# Patient Record
Sex: FEMALE | Race: WHITE | NOT HISPANIC OR LATINO | Employment: UNEMPLOYED | URBAN - METROPOLITAN AREA
[De-identification: names, ages, dates, MRNs, and addresses within clinical notes are randomized per-mention and may not be internally consistent; named-entity substitution may affect disease eponyms.]

---

## 2017-02-06 ENCOUNTER — HOSPITAL ENCOUNTER (EMERGENCY)
Facility: HOSPITAL | Age: 42
Discharge: HOME | End: 2017-02-06
Attending: SURGERY
Payer: MEDICAID

## 2017-02-06 VITALS
TEMPERATURE: 98 F | HEIGHT: 63 IN | RESPIRATION RATE: 20 BRPM | SYSTOLIC BLOOD PRESSURE: 161 MMHG | DIASTOLIC BLOOD PRESSURE: 97 MMHG | WEIGHT: 235 LBS | HEART RATE: 80 BPM | BODY MASS INDEX: 41.64 KG/M2

## 2017-02-06 DIAGNOSIS — K52.9 COLITIS: Primary | ICD-10-CM

## 2017-02-06 LAB
ALBUMIN SERPL BCP-MCNC: 3.9 G/DL
ALP SERPL-CCNC: 133 U/L
ALT SERPL W/O P-5'-P-CCNC: 224 U/L
AMYLASE SERPL-CCNC: 35 U/L
ANION GAP SERPL CALC-SCNC: 11 MMOL/L
APTT BLDCRRT: 25.6 SEC
AST SERPL-CCNC: 108 U/L
BASOPHILS # BLD AUTO: 0.02 K/UL
BASOPHILS NFR BLD: 0.2 %
BILIRUB SERPL-MCNC: 0.6 MG/DL
BILIRUB UR QL STRIP: NEGATIVE
BNP SERPL-MCNC: 10 PG/ML
BUN SERPL-MCNC: 8 MG/DL
CALCIUM SERPL-MCNC: 9.6 MG/DL
CHLORIDE SERPL-SCNC: 103 MMOL/L
CK MB SERPL-MCNC: 1 NG/ML
CK MB SERPL-RTO: 0.9 %
CK SERPL-CCNC: 112 U/L
CK SERPL-CCNC: 112 U/L
CLARITY UR: CLEAR
CO2 SERPL-SCNC: 23 MMOL/L
COLOR UR: YELLOW
CREAT SERPL-MCNC: 0.6 MG/DL
D DIMER PPP IA.FEU-MCNC: 0.39 MG/L FEU
DIFFERENTIAL METHOD: ABNORMAL
EOSINOPHIL # BLD AUTO: 0.1 K/UL
EOSINOPHIL NFR BLD: 1.2 %
ERYTHROCYTE [DISTWIDTH] IN BLOOD BY AUTOMATED COUNT: 14.8 %
EST. GFR  (AFRICAN AMERICAN): >60 ML/MIN/1.73 M^2
EST. GFR  (NON AFRICAN AMERICAN): >60 ML/MIN/1.73 M^2
GLUCOSE SERPL-MCNC: 154 MG/DL
GLUCOSE UR QL STRIP: NEGATIVE
HCT VFR BLD AUTO: 43.3 %
HGB BLD-MCNC: 14.7 G/DL
HGB UR QL STRIP: NEGATIVE
INR PPP: 1
KETONES UR QL STRIP: NEGATIVE
LEUKOCYTE ESTERASE UR QL STRIP: NEGATIVE
LYMPHOCYTES # BLD AUTO: 2.9 K/UL
LYMPHOCYTES NFR BLD: 34 %
MAGNESIUM SERPL-MCNC: 1.9 MG/DL
MCH RBC QN AUTO: 26.4 PG
MCHC RBC AUTO-ENTMCNC: 33.9 %
MCV RBC AUTO: 78 FL
MONOCYTES # BLD AUTO: 0.4 K/UL
MONOCYTES NFR BLD: 4.7 %
NEUTROPHILS # BLD AUTO: 5.1 K/UL
NEUTROPHILS NFR BLD: 59.9 %
NITRITE UR QL STRIP: NEGATIVE
PH UR STRIP: 6 [PH] (ref 5–8)
PHOSPHATE SERPL-MCNC: 3.4 MG/DL
PLATELET # BLD AUTO: 297 K/UL
PMV BLD AUTO: 10.3 FL
POTASSIUM SERPL-SCNC: 4.2 MMOL/L
PROT SERPL-MCNC: 8.6 G/DL
PROT UR QL STRIP: NEGATIVE
PROTHROMBIN TIME: 10.5 SEC
RBC # BLD AUTO: 5.56 M/UL
SODIUM SERPL-SCNC: 137 MMOL/L
SP GR UR STRIP: 1.02 (ref 1–1.03)
TROPONIN I SERPL DL<=0.01 NG/ML-MCNC: <0.006 NG/ML
TSH SERPL DL<=0.005 MIU/L-ACNC: 1.25 UIU/ML
URN SPEC COLLECT METH UR: NORMAL
UROBILINOGEN UR STRIP-ACNC: NEGATIVE EU/DL
WBC # BLD AUTO: 8.47 K/UL

## 2017-02-06 PROCEDURE — 99284 EMERGENCY DEPT VISIT MOD MDM: CPT | Mod: 25

## 2017-02-06 PROCEDURE — 85025 COMPLETE CBC W/AUTO DIFF WBC: CPT

## 2017-02-06 PROCEDURE — 84484 ASSAY OF TROPONIN QUANT: CPT

## 2017-02-06 PROCEDURE — 25500020 PHARM REV CODE 255: Performed by: SURGERY

## 2017-02-06 PROCEDURE — 80053 COMPREHEN METABOLIC PANEL: CPT

## 2017-02-06 PROCEDURE — 81003 URINALYSIS AUTO W/O SCOPE: CPT

## 2017-02-06 PROCEDURE — 85730 THROMBOPLASTIN TIME PARTIAL: CPT

## 2017-02-06 PROCEDURE — 96361 HYDRATE IV INFUSION ADD-ON: CPT

## 2017-02-06 PROCEDURE — 83735 ASSAY OF MAGNESIUM: CPT

## 2017-02-06 PROCEDURE — 84100 ASSAY OF PHOSPHORUS: CPT

## 2017-02-06 PROCEDURE — 96360 HYDRATION IV INFUSION INIT: CPT

## 2017-02-06 PROCEDURE — 36415 COLL VENOUS BLD VENIPUNCTURE: CPT

## 2017-02-06 PROCEDURE — 93005 ELECTROCARDIOGRAM TRACING: CPT

## 2017-02-06 PROCEDURE — 93010 ELECTROCARDIOGRAM REPORT: CPT | Mod: ,,, | Performed by: INTERNAL MEDICINE

## 2017-02-06 PROCEDURE — 84443 ASSAY THYROID STIM HORMONE: CPT

## 2017-02-06 PROCEDURE — 25000003 PHARM REV CODE 250: Performed by: SURGERY

## 2017-02-06 PROCEDURE — 82553 CREATINE MB FRACTION: CPT

## 2017-02-06 PROCEDURE — 82150 ASSAY OF AMYLASE: CPT

## 2017-02-06 PROCEDURE — 83880 ASSAY OF NATRIURETIC PEPTIDE: CPT

## 2017-02-06 PROCEDURE — 85379 FIBRIN DEGRADATION QUANT: CPT

## 2017-02-06 PROCEDURE — 85610 PROTHROMBIN TIME: CPT

## 2017-02-06 RX ORDER — DICYCLOMINE HYDROCHLORIDE 20 MG/1
20 TABLET ORAL 4 TIMES DAILY PRN
Qty: 15 TABLET | Refills: 0 | Status: SHIPPED | OUTPATIENT
Start: 2017-02-06 | End: 2017-03-08

## 2017-02-06 RX ORDER — ONDANSETRON 4 MG/1
4 TABLET, ORALLY DISINTEGRATING ORAL EVERY 8 HOURS PRN
Qty: 20 TABLET | Refills: 0 | Status: SHIPPED | OUTPATIENT
Start: 2017-02-06 | End: 2017-03-22

## 2017-02-06 RX ORDER — METRONIDAZOLE 500 MG/1
500 TABLET ORAL 4 TIMES DAILY
Qty: 28 TABLET | Refills: 0 | Status: SHIPPED | OUTPATIENT
Start: 2017-02-06 | End: 2017-02-13

## 2017-02-06 RX ORDER — CIPROFLOXACIN 500 MG/1
500 TABLET ORAL 2 TIMES DAILY
Qty: 14 TABLET | Refills: 0 | Status: SHIPPED | OUTPATIENT
Start: 2017-02-06 | End: 2017-02-13

## 2017-02-06 RX ORDER — SODIUM CHLORIDE 9 MG/ML
1000 INJECTION, SOLUTION INTRAVENOUS
Status: COMPLETED | OUTPATIENT
Start: 2017-02-06 | End: 2017-02-06

## 2017-02-06 RX ADMIN — IOHEXOL 100 ML: 350 INJECTION, SOLUTION INTRAVENOUS at 03:02

## 2017-02-06 RX ADMIN — IOHEXOL 30 ML: 350 INJECTION, SOLUTION INTRAVENOUS at 03:02

## 2017-02-06 RX ADMIN — SODIUM CHLORIDE 1000 ML: 0.9 INJECTION, SOLUTION INTRAVENOUS at 01:02

## 2017-02-06 NOTE — ED AVS SNAPSHOT
[facility] [address] [place] [address]               Blessing Maddox   2017  1:25 PM   ED    Description:  Female : 1975   Department:  [facility]           Your Care was Coordinated By:     Provider Role From To    Channing Franks MD Attending Provider 17 1300 --      Reason for Visit     Eye Problem     Abdominal Pain           Diagnoses this Visit        Comments    Colitis    -  Primary       ED Disposition     ED Disposition Condition Comment    Discharge             To Do List           Follow-up Information     Follow up with Thang Mcdonald MD. Schedule an appointment as soon as possible for a visit in 2 days.    Specialties:  Internal Medicine, Gastroenterology    Contact information:    [address] [id number] [phone]         These Medications        Disp Refills Start End    dicyclomine (BENTYL) 20 mg tablet 15 tablet 0 2017 3/8/2017    Take 1 tablet (20 mg total) by mouth 4 (four) times daily as needed (CRAMPS). - Oral    Pharmacy: [facility] [address] [place] [address] [place] [address] [address] [facility] Ph #: [phone]       ondansetron (ZOFRAN-ODT) 4 MG TbDL 20 tablet 0 2017     Take 1 tablet (4 mg total) by mouth every 8 (eight) hours as needed (nausea). - Oral    Pharmacy: [facility] [address] [place] [address] [address] [facility] Ph #: [phone]       ciprofloxacin HCl (CIPRO) 500 MG tablet 14 tablet 0 2017    Take 1 tablet (500 mg total) by mouth 2 (two) times daily. - Oral    Pharmacy: [facility] [address] [place] [address] [address] [facility] AT [facility] Ph #: [phone]       metronidazole (FLAGYL) 500 MG tablet 28 tablet 0 2017    Take 1 tablet (500 mg total) by mouth 4 (four) times daily. - Oral    Pharmacy: [facility] [address] [place] [address]  [facility] [address] AT [facility] Ph #: [phone]         [facility]     [facility] - 24/7 Assistance  Registered nurses in the [facility] provide clinical advisement, health education, appointment booking, and other advisory services.  Call for this free service at [phone].             Medications           Message regarding Medications     Verify the changes and/or additions to your medication regime listed below are the same as discussed with your clinician today.  If any of these changes or additions are incorrect, please notify your healthcare provider.        START taking these NEW medications        Refills    dicyclomine (BENTYL) 20 mg tablet 0    Sig: Take 1 tablet (20 mg total) by mouth 4 (four) times daily as needed (CRAMPS).    Class: Normal    Route: Oral    ondansetron (ZOFRAN-ODT) 4 MG TbDL 0    Sig: Take 1 tablet (4 mg total) by mouth every 8 (eight) hours as needed (nausea).    Class: Normal    Route: Oral    ciprofloxacin HCl (CIPRO) 500 MG tablet 0    Sig: Take 1 tablet (500 mg total) by mouth 2 (two) times daily.    Class: Normal    Route: Oral    metronidazole (FLAGYL) 500 MG tablet 0    Sig: Take 1 tablet (500 mg total) by mouth 4 (four) times daily.    Class: Normal    Route: Oral      These medications were administered today        Dose Freq    0.9%  NaCl infusion 1,000 mL ED 1 Time    Sig: Inject 1,000 mLs into the vein ED 1 Time.    Class: Normal    Route: Intravenous    omnipaque 350 iohexol 100 mL 100 mL IMG once as needed    Sig: Inject 100 mLs into the vein ONCE PRN for contrast.    Class: Normal    Route: Intravenous    omnipaque 350 iohexol 30 mL 30 mL IMG once as needed    Sig: Take 30 mLs by mouth ONCE PRN for contrast.    Class: Normal    Route: Oral           Verify that the below list of medications is an accurate representation of the medications you are currently taking.  If none reported, the list may be blank. If  "incorrect, please contact your healthcare provider. Carry this list with you in case of emergency.           Current Medications     lisinopril 10 MG tablet Take 10 mg by mouth once daily.    metformin (GLUCOPHAGE) 500 MG tablet Take 500 mg by mouth 2 (two) times daily with meals.    paroxetine (PAXIL) 10 MG tablet Take 1 tablet (10 mg total) by mouth once daily.    ciprofloxacin HCl (CIPRO) 500 MG tablet Take 1 tablet (500 mg total) by mouth 2 (two) times daily.    dicyclomine (BENTYL) 20 mg tablet Take 1 tablet (20 mg total) by mouth 4 (four) times daily as needed (CRAMPS).    metronidazole (FLAGYL) 500 MG tablet Take 1 tablet (500 mg total) by mouth 4 (four) times daily.    omeprazole (PRILOSEC) 20 MG capsule Take 1 capsule (20 mg total) by mouth once daily.    ondansetron (ZOFRAN-ODT) 4 MG TbDL Take 1 tablet (4 mg total) by mouth every 8 (eight) hours as needed (nausea).           Clinical Reference Information           Your Vitals Were     BP Pulse Temp Resp Height Weight    161/97 (BP Location: Left arm) 80 97.7 °F (36.5 °C) (Oral) 20 5' 3" (1.6 m) 106.6 kg (235 lb)    Last Period BMI             05/01/2015 41.63 kg/m2         Allergies as of 2/6/2017     No Known Allergies      Immunizations Administered on Date of Encounter - 2/6/2017     None      ED Micro, Lab, POCT     Start Ordered       Status Ordering Provider    02/06/17 1330 02/06/17 1331  CBC auto differential  STAT      Final result     02/06/17 1330 02/06/17 1331  Comprehensive metabolic panel  STAT      Final result     02/06/17 1330 02/06/17 1331  Amylase  Once      Final result     02/06/17 1330 02/06/17 1331  Urinalysis  STAT      Final result     02/06/17 1330 02/06/17 1331  Phosphorus  STAT      Final result     02/06/17 1330 02/06/17 1331  Magnesium  STAT      Final result     02/06/17 1330 02/06/17 1331  TSH  STAT      Final result     02/06/17 1330 02/06/17 1331  Protime-INR  STAT      Final result     02/06/17 1330 02/06/17 1331  APTT  " STAT      Final result     02/06/17 1330 02/06/17 1331  D dimer, quantitative  STAT      Final result     02/06/17 1330 02/06/17 1331  Brain natriuretic peptide  STAT      Final result     02/06/17 1330 02/06/17 1331  CK-MB  STAT      Final result     02/06/17 1330 02/06/17 1331  CK  STAT      Final result     02/06/17 1330 02/06/17 1331  Troponin I  Now then every 6 hours     Start Status   02/06/17 1330 Final result   02/06/17 1930 Acknowledged   02/07/17 0130 Scheduled   02/07/17 0730 Scheduled   02/07/17 1330 Scheduled   02/07/17 1930 Scheduled   02/08/17 0130 Scheduled   02/08/17 0730 Scheduled   02/08/17 1330 Scheduled   02/08/17 1930 Scheduled   02/09/17 0130 Scheduled   02/09/17 0730 Scheduled   02/09/17 1330 Scheduled   02/09/17 1930 Scheduled       Acknowledged       ED Imaging Orders     Start Ordered       Status Ordering Provider    02/06/17 1330 02/06/17 1331  CT Abdomen Pelvis With Contrast  1 time imaging      Final result     02/06/17 1330 02/06/17 1331  CT Head Without Contrast  1 time imaging      Final result     02/06/17 1330 02/06/17 1331    1 time imaging,   Status:  Canceled      Canceled       Discharge References/Attachments     GASTROENTERITIS, NONINFECTIOUS (ENGLISH)      Your Scheduled Appointments     Feb 15, 2017  8:45 AM CST   Return Oncology with Demetris Smiley MD   [facility] - GYN Oncology ([facility] )    [address]  [place] [address]   [phone]              [facility] Sign-Up     Activating your [facility] account is as easy as 1-2-3!     1) Visit [email or web address], select Sign Up Now, enter this activation code and your date of birth, then select Next.  [email or web address]  Expires: 3/22/2017  2:07 PM      2) Create a username and password to use when you visit [facility] in the future and select a security question in case you lose your password and select Next.    3) Enter your e-mail address and click Sign Up!    Additional Information  If you have questions,  please e-mail [email or web address] or call [phone] to talk to our [facility] staff. Remember, [facility] is NOT to be used for urgent needs. For medical emergencies, dial 911.          [facility] complies with applicable Federal civil rights laws and does not discriminate on the basis of race, color, national origin, age, disability, or sex.        Language Assistance Services     ATTENTION: Language assistance services are available, free of charge. Please call [phone].      ATENCIÓN: Si habla español, tiene a fuentes disposición servicios gratuitos de asistencia lingüística. Llame al [phone].     CHÚ Ý: N?u b?n nói Ti?ng Vi?t, có các d?ch v? h? tr? ngôn ng? mi?n phí dành cho b?n. G?i s? [phone].        Medications Administered     0.9%  NaCl infusion                  omnipaque 350 iohexol 100 mL                  omnipaque 350 iohexol 30 mL                    Administrations This Visit        Admin Date Action                   0.9%  NaCl infusion 02/06/2017 New Bag                   Admin Date Action                   omnipaque 350 iohexol 100 mL 02/06/2017 Given                   Admin Date Action                   omnipaque 350 iohexol 30 mL 02/06/2017 Given                  Administrations This Visit     0.9%  NaCl infusion     Admin Date Action Dose Rate Route Administered By          02/06/2017 New Bag 1000 mL 999 mL/hr Intravenous Nadia Mota RN                   omnipaque 350 iohexol 100 mL     Admin Date Action Dose Route Administered By             02/06/2017 Given 100 mL Intravenous Evita Olvera                    omnipaque 350 iohexol 30 mL     Admin Date Action Dose Route Administered By             02/06/2017 Given 30 mL Oral Evita Olvera

## 2017-02-06 NOTE — ED PROVIDER NOTES
"[facility]                                        2017                   Chief Complaint  41 y.o. female with Eye Problem and Abdominal Pain (RUQ)    History of Present Illness  Blessing Maddox presents to the emergency room with right upper quadrant pain for months  Patient has been seeing a nurse practitioner who said she had elevation of her liver enzymes  Patient states that she wanted evaluation of her LFTs and chronic right upper quadrant pain  Patient wants another emergency room yesterday, mild elevation of LFTs noted on evaluation  Patient states she has daily right upper quadrant pain, "I think have been jaundiced this week"  Patient has no evidence of jaundice, has a soft obese abdomen in the ER on evaluation today    Patient states also she was newly diagnosed with diabetes, blood sugar the 200s today  Patient states at times she has blurry vision, she thinks it is from diabetic eye disease  Patient on exam today is normal visual acuity no field deficits on ER evaluation today  Patient states she would like to be evaluated for diabetes and her abdominal pain    The history is provided by the patient    Past Medical History   -- Diabetes mellitus    -- DUB (dysfunctional uterine bleeding)    -- Endometrial hyperplasia    -- Hypertension    -- Low back pain    -- TIA (transient ischemic attack)    -- Uterine cancer      Past Surgical History   -- Hysteroscopy, d&c     -- Hysterectomy     --  section     -- Cholecystectomy     -- Bilateral salpingoophorectomy        No Known Allergies     Review of Systems and Physical Exam     Review of Systems  -- Constitution - no fever, denies fatigue, no weakness, no chills  -- Eyes - no tearing or redness, no visual disturbance  -- Ear, Nose - no tinnitus or earache, no nasal congestion or discharge  -- Mouth,Throat - no sore throat, no toothache, normal voice, normal swallowing  -- Respiratory - denies cough and " congestion, no shortness of breath, no SALINAS  -- Cardiovascular - denies chest pain, no palpitations, denies claudication  -- Gastrointestinal - abdominal cramps with nausea, vomiting, & diarrhea   -- Musculoskeletal - denies back pain, negative for myalgias and arthralgias   -- Neurological - headache, denies weakness or seizure; no LOC  -- Skin - denies pallor, rash, or changes in skin. no hives or welts noted    Vital Signs  -- Temperature is 97.7 °F (36.5 °C).   -- Her blood pressure is 161/97 (abnormal) and her pulse is 80.   -- Her respiration is 20.      Physical Exam  -- Nursing note and vitals reviewed  -- Constitutional: Appears well-developed and well-nourished  -- Head: Atraumatic. Normocephalic. No obvious abnormality  -- Eyes: Pupils are equal and reactive to light. Normal conjunctiva and lids  -- Cardiac: Normal rate, regular rhythm and normal heart sounds  -- Pulmonary: Normal respiratory effort, breath sounds clear to auscultation  -- Abdominal: Soft, no tenderness. Normal bowel sounds. Normal liver edge  -- Musculoskeletal: Normal range of motion, no effusions. Joints stable   -- Neurological: No focal deficits. Showed good interaction with staff    Emergency Room Course     Treatment and Evaluation  -- The CT of the head performed in the ER today was negative for acute pathology   -- The EKG findings today were without concerning findings from baseline   -- The CBC drawn in the ER today was within normal limits   -- The troponin drawn in the ER today was within normal limits    -- The magnesium and phosphorus were within normal limits   -- The d-dimer drawn in the ER today were within normal limits.   -- The BNP drawn in the ER today were within normal limits  -- The PT, PTT, and INR were within normal limits.    -- CT of the abdomen and pelvis shows colitis    Abnormal lab values  -- Glucose 154 (*)   -- Total Protein 8.6 (*)   --  (*)   --  (*)     Medications Given  -- 0.9%  NaCl  infusion (0 mLs Intravenous Stopped 2/6/17 1530)   -- omnipaque 350 iohexol 100 mL (100 mLs Intravenous Given 2/6/17 1557)   -- omnipaque 350 iohexol 30 mL (30 mLs Oral Given 2/6/17 1557)     ED Management  -- The patient had normal lab work in the ER with a CT of the abdomen showed colitis  -- Patient gives no history of recent antibiotic use or risk factors for C. difficile colitis  -- Patient will be placed on Cipro, counseled on close follow up with gastroenterology  -- Patient had minimal elevation of her LFTs, counseled to follow up with gastroenterology  -- Patient is asymptomatic prior to discharge, agrees with plan of care for this week    Diagnosis  -- The encounter diagnosis was Colitis.    Disposition and Plan  -- Disposition: home  -- Condition: stable  -- Follow-up: Patient to follow up with Libra Del Valle MD in 1-2 days.  -- I advised the patient that we have found no life threatening condition today  -- At this time, I believe the patient is clinically stable for discharge.   -- The patient acknowledges that close follow up with a MD is required   -- Patient agrees to comply with all instruction and direction given in the ER    This note is dictated on Dragon Natural Speaking word recognition program.  There are word recognition mistakes that are occasionally missed on review.           Channing Franks MD  02/06/17 9753

## 2017-02-15 ENCOUNTER — OFFICE VISIT (OUTPATIENT)
Dept: GYNECOLOGIC ONCOLOGY | Facility: CLINIC | Age: 42
End: 2017-02-15
Payer: MEDICAID

## 2017-02-15 VITALS
DIASTOLIC BLOOD PRESSURE: 73 MMHG | BODY MASS INDEX: 41.61 KG/M2 | HEIGHT: 63 IN | HEART RATE: 88 BPM | SYSTOLIC BLOOD PRESSURE: 129 MMHG | WEIGHT: 234.81 LBS

## 2017-02-15 DIAGNOSIS — C54.1 ENDOMETRIAL CANCER: Primary | ICD-10-CM

## 2017-02-15 PROCEDURE — 99213 OFFICE O/P EST LOW 20 MIN: CPT | Mod: PBBFAC | Performed by: OBSTETRICS & GYNECOLOGY

## 2017-02-15 PROCEDURE — 99999 PR PBB SHADOW E&M-EST. PATIENT-LVL III: CPT | Mod: PBBFAC,,, | Performed by: OBSTETRICS & GYNECOLOGY

## 2017-02-15 PROCEDURE — 99214 OFFICE O/P EST MOD 30 MIN: CPT | Mod: S$PBB,,, | Performed by: OBSTETRICS & GYNECOLOGY

## 2017-02-15 RX ORDER — LANCETS 30 GAUGE
EACH MISCELLANEOUS
Refills: 2 | Status: ON HOLD | COMMUNITY
Start: 2017-01-24 | End: 2021-01-01 | Stop reason: HOSPADM

## 2017-02-15 RX ORDER — BLOOD SUGAR DIAGNOSTIC
STRIP MISCELLANEOUS
Refills: 2 | COMMUNITY
Start: 2017-01-24 | End: 2017-04-05 | Stop reason: SDUPTHER

## 2017-02-15 NOTE — PROGRESS NOTES
Subjective:      Patient ID: Blessing Maddox is a 41 y.o. female.    Chief Complaint: Endometrial Cancer (3 mth)    Treatment History:  Outside [facility] revealing grade I endometrial cancer  RA BSO/Pelvic LAD on 9/15/15 with negative path - Final stage IAG1  IHC of primary tumor for MMR was normal    HPI  Returns today for 3 month follow up visit. Denies VB.  Had a bad fall down the stairs 1 month ago with possible liver contusion.  Now in middle of eval for liver dysfunction.      Review of Systems   Constitutional: Negative for activity change, appetite change, chills, fatigue and fever.   HENT: Negative for hearing loss, mouth sores, nosebleeds, sore throat and tinnitus.    Eyes: Negative for visual disturbance.   Respiratory: Negative for cough, chest tightness, shortness of breath and wheezing.    Cardiovascular: Negative for chest pain and leg swelling.   Gastrointestinal: Negative for abdominal distention, abdominal pain, blood in stool, constipation, diarrhea, nausea and vomiting.   Genitourinary: Negative for dysuria, flank pain, frequency, hematuria, vaginal bleeding and vaginal discharge.   Musculoskeletal: Negative for arthralgias and back pain.   Skin: Negative for rash.   Neurological: Negative for dizziness, seizures, syncope, weakness and numbness.   Hematological: Does not bruise/bleed easily.   Psychiatric/Behavioral: Positive for dysphoric mood and sleep disturbance. Negative for confusion. The patient is not nervous/anxious.         Objective:   Physical Exam:   Constitutional: She appears well-developed and well-nourished. No distress.    HENT:   Head: Normocephalic and atraumatic.    Eyes: No scleral icterus.    Neck: Normal range of motion. Neck supple.    Cardiovascular: Normal rate and intact distal pulses.  Exam reveals no cyanosis and no edema.     Pulmonary/Chest: Effort normal. No respiratory distress. She exhibits no tenderness.        Abdominal: Soft. She exhibits no distension, no  fluid wave, no ascites and no mass. There is no tenderness. There is no rebound and no guarding. No hernia.     Genitourinary: Rectum normal and vagina normal. Pelvic exam was performed with patient supine. There is no rash, tenderness or lesion on the right labia. There is no rash, tenderness or lesion on the left labia. Uterus is absent. There is an absent adnexa. Right adnexum displays no mass, no tenderness and no fullness. Left adnexum displays no mass, no tenderness and no fullness. No bleeding or unspecified prolapse of vaginal walls in the vagina. No vaginal discharge found. Vaginal cuff normal.Labial bartholins normal.Cervix exhibits absence.              Lymphadenopathy:     She has no cervical adenopathy.        Right: No inguinal adenopathy present.        Left: No inguinal adenopathy present.     Skin: No cyanosis.        Assessment:     1. Endometrial cancer        Plan:    Endometrial cancer   - CARLI on exam   -  RTC in 3 months or sooner if needed

## 2017-04-11 PROBLEM — E11.9 DIABETES MELLITUS: Status: ACTIVE | Noted: 2017-04-11

## 2017-04-24 PROBLEM — R93.3 ABNORMAL CT SCAN, GASTROINTESTINAL TRACT: Status: ACTIVE | Noted: 2017-04-24

## 2017-04-24 PROBLEM — K76.0 FATTY LIVER: Status: ACTIVE | Noted: 2017-04-24

## 2017-04-24 PROBLEM — R79.89 ELEVATED LFTS: Status: ACTIVE | Noted: 2017-04-24

## 2017-05-24 PROBLEM — R93.89 ABNORMAL CT SCAN: Status: ACTIVE | Noted: 2017-05-24

## 2017-06-24 DIAGNOSIS — N95.1 VASOMOTOR SYMPTOMS DUE TO MENOPAUSE: ICD-10-CM

## 2017-06-24 DIAGNOSIS — F32.89 OTHER DEPRESSION: ICD-10-CM

## 2017-06-24 RX ORDER — PAROXETINE 10 MG/1
TABLET, FILM COATED ORAL
Qty: 30 TABLET | Refills: 0 | Status: CANCELLED | OUTPATIENT
Start: 2017-06-24

## 2018-05-22 PROBLEM — N85.00 ENDOMETRIAL HYPERPLASIA: Status: ACTIVE | Noted: 2018-05-22

## 2018-08-20 ENCOUNTER — TELEPHONE (OUTPATIENT)
Dept: ADMINISTRATIVE | Facility: HOSPITAL | Age: 43
End: 2018-08-20

## 2019-01-23 PROCEDURE — 88360 TUMOR IMMUNOHISTOCHEM/MANUAL: CPT | Performed by: PATHOLOGY

## 2019-01-23 PROCEDURE — 88323 CONSLTJ&REPRT MATRL PREP SLD: CPT | Performed by: PATHOLOGY

## 2019-01-23 PROCEDURE — 88360 TUMOR IMMUNOHISTOCHEM/MANUAL: CPT | Mod: 59 | Performed by: PATHOLOGY

## 2019-02-08 PROBLEM — C50.512 MALIGNANT NEOPLASM OF LOWER-OUTER QUADRANT OF LEFT FEMALE BREAST: Status: ACTIVE | Noted: 2019-02-08

## 2019-02-15 PROBLEM — C50.919 TRIPLE NEGATIVE MALIGNANT NEOPLASM OF BREAST: Status: ACTIVE | Noted: 2019-02-15

## 2019-02-15 PROBLEM — C50.512 MALIGNANT NEOPLASM OF LOWER-OUTER QUADRANT OF LEFT BREAST OF FEMALE, ESTROGEN RECEPTOR NEGATIVE: Status: ACTIVE | Noted: 2019-02-15

## 2019-02-15 PROBLEM — Z95.828 PORT-A-CATH IN PLACE: Status: ACTIVE | Noted: 2019-02-15

## 2019-02-15 PROBLEM — Z17.1 MALIGNANT NEOPLASM OF LOWER-OUTER QUADRANT OF LEFT BREAST OF FEMALE, ESTROGEN RECEPTOR NEGATIVE: Status: ACTIVE | Noted: 2019-02-15

## 2019-04-03 PROBLEM — R79.89 ELEVATED LACTIC ACID LEVEL: Status: ACTIVE | Noted: 2019-04-03

## 2019-04-03 PROBLEM — A41.9 SEPSIS: Status: ACTIVE | Noted: 2019-04-03

## 2019-04-04 PROBLEM — M79.601 MUSCULOSKELETAL PAIN OF RIGHT UPPER EXTREMITY: Status: ACTIVE | Noted: 2019-04-04

## 2019-04-04 PROBLEM — I10 HYPERTENSION: Status: ACTIVE | Noted: 2019-04-04

## 2019-04-05 PROBLEM — A41.9 SEPSIS: Status: RESOLVED | Noted: 2019-04-03 | Resolved: 2019-04-05

## 2019-05-06 PROBLEM — E83.42 HYPOMAGNESEMIA: Status: ACTIVE | Noted: 2019-05-06

## 2019-06-24 PROBLEM — R79.89 ELEVATED LACTIC ACID LEVEL: Status: RESOLVED | Noted: 2019-04-03 | Resolved: 2019-06-24

## 2019-07-15 PROBLEM — G62.9 NEUROPATHY: Status: ACTIVE | Noted: 2019-07-15

## 2019-08-14 ENCOUNTER — OFFICE VISIT (OUTPATIENT)
Dept: PLASTIC SURGERY | Facility: CLINIC | Age: 44
End: 2019-08-14
Payer: MEDICAID

## 2019-08-14 VITALS
DIASTOLIC BLOOD PRESSURE: 88 MMHG | BODY MASS INDEX: 43.43 KG/M2 | HEART RATE: 108 BPM | WEIGHT: 245.13 LBS | SYSTOLIC BLOOD PRESSURE: 136 MMHG

## 2019-08-14 DIAGNOSIS — Z85.3 HX OF BREAST CANCER: Primary | ICD-10-CM

## 2019-08-14 DIAGNOSIS — Z09 SURGERY FOLLOW-UP EXAMINATION: ICD-10-CM

## 2019-08-14 PROCEDURE — 99024 PR POST-OP FOLLOW-UP VISIT: ICD-10-PCS | Mod: ,,, | Performed by: SURGERY

## 2019-08-14 PROCEDURE — 99999 PR PBB SHADOW E&M-EST. PATIENT-LVL III: CPT | Mod: PBBFAC,,, | Performed by: SURGERY

## 2019-08-14 PROCEDURE — 99213 OFFICE O/P EST LOW 20 MIN: CPT | Mod: PBBFAC | Performed by: SURGERY

## 2019-08-14 PROCEDURE — 99999 PR PBB SHADOW E&M-EST. PATIENT-LVL III: ICD-10-PCS | Mod: PBBFAC,,, | Performed by: SURGERY

## 2019-08-14 PROCEDURE — 99024 POSTOP FOLLOW-UP VISIT: CPT | Mod: ,,, | Performed by: SURGERY

## 2019-08-14 NOTE — PROGRESS NOTES
History & Physical    SUBJECTIVE:   Chief complaint: consult for breast reconsutriction    History of Present Illness:  43 y.o. female with triple negative T1 left breast cancer.  Is now 4 weeks after her last neoadjuvant chemotherapy treatment.  Here to discuss breast reconstruction options.    Saw Dr. Soriano last week.. Discussed lumpectomy and mastectomy options and based on her oncologist recs would prefer bilateral mastectomies.  During chemotherapy she became a insulin depended diabetic.  BMI 44.  History of vertical and low transverse abdominal incisions  Otherwise history of HLD, HTN.  Denies use of steroids    Past Medical History:   Diagnosis Date    Breast cancer     Diabetes mellitus     DUB (dysfunctional uterine bleeding)     Endometrial hyperplasia     Fatty liver     HLD (hyperlipidemia)     Hypertension     Low back pain     TIA (transient ischemic attack)     Uterine cancer        Past Surgical History:   Procedure Laterality Date    BILATERAL SALPINGOOPHORECTOMY  2015    Pelvic lymph node dissection    BREAST BIOPSY Left 2019    us guided     SECTION      x5    CHOLECYSTECTOMY      COLONOSCOPY N/A 2017    [facility] at [facility] ENDO    HYSTERECTOMY  6-4-15    hysteroscopy, D&C  4-23-15    DDJUIALOZ-LKIS-J-CATH Right 2/15/2019    Performed by Grupo Sheriff MD at [facility] OR    LYMPHADENECTOMY-PERIAORTIC N/A 9/15/2015    Performed by [facility]ris Smiley [facility] at [facility] OR 2ND FLR    VICAS-IEBPARFG-WQDQPVPFUCQS N/A 9/15/2015    Performed by [facility] at [facility] OR 2ND FLR    ROBOT ASSISTED LAPAROSCOPIC SALPINGO-OOPHERECTOMY Bilateral 9/15/2015    Performed by [facility]ris Smiley MD at [facility] OR 2ND FLR       Family History   Problem Relation Age of Onset    COPD Mother     Hypertension Mother     Stroke Mother         x3    Sleep apnea Mother     Heart disease Maternal Grandmother     Hypertension Maternal Grandmother     Ovarian cancer  Maternal Aunt     Uterine cancer Maternal Aunt     Colon cancer Neg Hx     Esophageal cancer Neg Hx     Rectal cancer Neg Hx     Stomach cancer Neg Hx        Social History     Socioeconomic History    Marital status:      Spouse name: Not on file    Number of children: Not on file    Years of education: Not on file    Highest education level: Not on file   Occupational History    Not on file   Social Needs    Financial resource strain: Not on file    Food insecurity:     Worry: Not on file     Inability: Not on file    Transportation needs:     Medical: Not on file     Non-medical: Not on file   Tobacco Use    Smoking status: Never Smoker    Smokeless tobacco: Never Used   Substance and Sexual Activity    Alcohol use: No     Alcohol/week: 0.0 oz    Drug use: No    Sexual activity: Not on file   Lifestyle    Physical activity:     Days per week: Not on file     Minutes per session: Not on file    Stress: Not on file   Relationships    Social connections:     Talks on phone: Not on file     Gets together: Not on file     Attends Worship service: Not on file     Active member of club or organization: Not on file     Attends meetings of clubs or organizations: Not on file     Relationship status: Not on file   Other Topics Concern    Not on file   Social History Narrative    Not on file       Current Outpatient Medications   Medication Sig Dispense Refill    atorvastatin (LIPITOR) 20 MG tablet Take 1 tablet (20 mg total) by mouth once daily. 90 tablet 3    gabapentin (NEURONTIN) 300 MG capsule Take 2 capsules (600 mg total) by mouth 3 (three) times daily. 180 capsule 3    ibuprofen (ADVIL,MOTRIN) 400 MG tablet Take 1 tablet (400 mg total) by mouth every 6 (six) hours as needed for Other (alternate with tylenol).      insulin (LANTUS SOLOSTAR U-100 INSULIN) glargine 100 units/mL (3mL) SubQ pen Inject 60 Units into the skin every evening. 18 mL 11    insulin lispro (HUMALOG U-100  "INSULIN) 100 unit/mL injection Inject 10 Units into the skin 3 (three) times daily before meals. 9 mL 11    insulin syringe-needle U-100 0.3 mL 31 gauge x 5/16" Syrg 1 each by Misc.(Non-Drug; Combo Route) route 3 (three) times daily with meals. 100 each 5    lidocaine (LIDODERM) 5 % Place 1 patch onto the skin daily as needed. Remove & Discard patch within 12 hours or as directed by MD as needed for pain. 30 patch 5    liraglutide 0.6 mg/0.1 mL, 18 mg/3 mL, subq PNIJ (VICTOZA 2-BRITTANY) 0.6 mg/0.1 mL (18 mg/3 mL) PnIj Inject 1.8 mg into the skin once daily. 9 mL 11    lisinopril 10 MG tablet Take 1 tablet (10 mg total) by mouth once daily. 90 tablet 3    metFORMIN (GLUCOPHAGE) 1000 MG tablet Take 1 tablet (1,000 mg total) by mouth 2 (two) times daily with meals. 180 tablet 3    ondansetron (ZOFRAN-ODT) 8 MG TbDL Take 1 tablet (8 mg total) by mouth every 8 (eight) hours as needed. 90 tablet 3    ONETOUCH DELICA LANCETS 33 gauge Misc Inject 1 lancet into the skin 4 (four) times daily as needed. 200 each 5    ONETOUCH ULTRA BLUE TEST STRIP Strp 1 strip by Misc.(Non-Drug; Combo Route) route 4 (four) times daily as needed. 200 strip 5    ONETOUCH ULTRA2 kit U TO TEST BLOOD SUGAR D  2    pen needle, diabetic 33 gauge x 5/32" Ndle 1 each by Misc.(Non-Drug; Combo Route) route every evening. 100 each 5     No current facility-administered medications for this visit.      Facility-Administered Medications Ordered in Other Visits   Medication Dose Route Frequency Provider Last Rate Last Dose    diphenhydrAMINE (BENADRYL) 50 mg in sodium chloride 0.9% 50 mL IVPB  50 mg Intravenous 1 time in Clinic/HOD Melvin Chisholm MD        heparin, porcine (PF) 100 unit/mL injection flush 500 Units  500 Units Intravenous PRN Melvin Chisholm MD        heparin, porcine (PF) 100 unit/mL injection flush 500 Units  500 Units Intravenous PRN Melvin Chisholm MD   500 Units at 06/24/19 1017    lactated ringers infusion   " Intravenous Continuous Nathalie Vickers MD        lidocaine (PF) 10 mg/ml (1%) injection 10 mg  1 mL Intradermal Once Nathalie Vickers MD        ondansetron disintegrating tablet 8 mg  8 mg Oral Once PRN Nathalie Vickers MD        PACLitaxel (TAXOL) 80 mg/m2 = 180 mg in sodium chloride 0.9% 280 mL chemo infusion  80 mg/m2 (Treatment Plan Recorded) Intravenous 1 time in Clinic/HOD Melvin Chisholm MD        palonosetron (ALOXI) 0.25 mg, dexamethasone (DECADRON) 10 mg in sodium chloride 0.9% 50 mL IVPB   Intravenous 1 time in Clinic/HOD Melvin Chisholm MD           Review of patient's allergies indicates:   Allergen Reactions    Ciprofloxacin Other (See Comments)     Made pt lose eye sight for seven days    Flagyl [metronidazole] Swelling     THROAT         Review of Systems:    Review of systems negative except as pertinent positive and negatives  listed above      OBJECTIVE:     /88   Pulse 108   Wt 111.2 kg (245 lb 2.4 oz)   LMP 06/04/2015 (Exact Date)   BMI 43.43 kg/m²       Physical Exam:  Gen: NAD, Aox3  Neuro: no focal deficits  HEENT: NCAT, , neck supple, no masses  CV: RRR  Pulm: Breathing non-labored, chest wall movement equal bilaterally  Breast: ptosis, good skin integreity  Abdomen: soft, nontender, no guarding  Gu: no rashes or wounds  Extremity:normal strength, no cyanosis or edema  Psych: normal mood and affect          ASSESSMENT/PLAN:     Discussed reconstructive options including implant based and autologous reconstruction.  Unfortunately now not a candidate for ASHLEY flaps with her DM and BMI.  Recommend expander placement at time of mastectomy.  If with expansion she losses weight and optimizes her DM, may be shayla to consider flaps at that time, otherwise wmay have implants.  Discussed pre-ped vs submuscular placement vs no reconstruction based on mastectomy skin flap quality  All questions answered    DO Paty Patino Plastic Surgery Fellow     Cell: [phone]

## 2019-08-14 NOTE — LETTER
Dontae Almanzaryao - Plastic Surg [facility]  [address]  [facility] [address]  Phone: [phone]  Fax: [phone] August 14, 2019        Mary Soriano MD  [address]  [facility] [address]    Patient: Blessing Maddox   MR Number: [id number]   YOB: 1975   Date of Visit: 8/14/2019       Dear Dr. Mary Soriano:    Thank you for referring Blessing Maddox to me for evaluation. Below you will find relevant portions of my assessment and plan of care.    Ms. Maddox is a 43-year-old female with triple negative T1 left breast cancer. It is now 4 weeks after her last neoadjuvant chemotherapy treatment.  She is here to discuss breast reconstruction options. She saw Dr. Soriano last week.  Discussed lumpectomy and mastectomy options and based on her oncologist recommendations would prefer bilateral mastectomies.    We discussed reconstructive options including implant based and autologous reconstruction.  Unfortunately now not a candidate for ASHLEY flaps with her DM and BMI.  Recommend expander placement at time of mastectomy.  If with expansion she looses weight and optimizes her DM, she may be able to consider flaps at that time, otherwise she may have implants.    Discussed pre-ped versus submuscular placement versus no reconstruction based on mastectomy skin flap quality. All her questions were answered.    If you have questions, please do not hesitate to call me. I look forward to following Blessing Maddox along with you.    Sincerely,    Holden Abernathy MD  Section of Plastic Surgery  Department of Surgery  [facility]     CRB/hcr    CC:  Brittanie Douglas NP

## 2019-08-30 ENCOUNTER — TELEPHONE (OUTPATIENT)
Dept: SURGERY | Facility: CLINIC | Age: 44
End: 2019-08-30

## 2019-08-30 DIAGNOSIS — Z17.1 MALIGNANT NEOPLASM OF LOWER-OUTER QUADRANT OF LEFT BREAST OF FEMALE, ESTROGEN RECEPTOR NEGATIVE: Primary | ICD-10-CM

## 2019-08-30 DIAGNOSIS — C50.512 MALIGNANT NEOPLASM OF LOWER-OUTER QUADRANT OF LEFT BREAST OF FEMALE, ESTROGEN RECEPTOR NEGATIVE: Primary | ICD-10-CM

## 2019-08-30 NOTE — TELEPHONE ENCOUNTER
----- Message from Ra Carter sent at 8/30/2019  9:11 AM CDT -----  Contact: pt  The pt states she needs to speak with [place] regarding scheduling her surgery date.     Communication preference: [phone]

## 2019-09-03 DIAGNOSIS — E11.9 DIABETES MELLITUS WITHOUT COMPLICATION: Primary | ICD-10-CM

## 2019-09-04 ENCOUNTER — TELEPHONE (OUTPATIENT)
Dept: PREADMISSION TESTING | Facility: HOSPITAL | Age: 44
End: 2019-09-04

## 2019-09-04 ENCOUNTER — ANESTHESIA EVENT (OUTPATIENT)
Dept: SURGERY | Facility: HOSPITAL | Age: 44
DRG: 580 | End: 2019-09-04
Payer: MEDICAID

## 2019-09-04 DIAGNOSIS — C50.919 TRIPLE NEGATIVE MALIGNANT NEOPLASM OF BREAST: Primary | ICD-10-CM

## 2019-09-04 NOTE — PRE ADMISSION SCREENING
Anesthesia Assessment: Preoperative EQUATION    Planned Procedure: Procedure(s) (LRB):  INSERTION, TISSUE EXPANDER, BREAST (Bilateral)  MASTECTOMY-skin sparing (Bilateral)  INJECTION, FOR SENTINEL NODE IDENTIFICATION (Left)  BIOPSY, LYMPH NODE, SENTINEL (Left)  LYMPHADENECTOMY, AXILLARY (Left)  Requested Anesthesia Type:General  Surgeon: Holden Abernathy MD  Service: Plastics  Known or anticipated Date of Surgery:9/12/2019  Optimization:  Anesthesia Preop Clinic Assessment  Indicated    Medical Opinion Indicated      Plan:    Testing:  A1C   Pre-anesthesia  visit       Visit focus: concerns in complex and/or prolonged anesthesia     Consultation:Patient's PCP for a statement of optimization      Patient  TO scheduled Medical Appointment:    Navigation: Tests Scheduled.              Consults scheduled.             Results will be tracked by Preop Clinic.

## 2019-09-04 NOTE — ANESTHESIA PREPROCEDURE EVALUATION
Floridalma Martinez RN   Registered Nurse      Pre Admission Screening   Signed                       []Hide copied text    []Hover for details  Anesthesia Assessment: Preoperative EQUATION     Planned Procedure: Procedure(s) (LRB):  INSERTION, TISSUE EXPANDER, BREAST (Bilateral)  MASTECTOMY-skin sparing (Bilateral)  INJECTION, FOR SENTINEL NODE IDENTIFICATION (Left)  BIOPSY, LYMPH NODE, SENTINEL (Left)  LYMPHADENECTOMY, AXILLARY (Left)  Requested Anesthesia Type:General  Surgeon: Holden Abernathy MD  Service: Plastics  Known or anticipated Date of Surgery:9/12/2019  Optimization:  Anesthesia Preop Clinic Assessment  Indicated    Medical Opinion Indicated                           Plan:               Testing:  A1C   Pre-anesthesia  visit                                        Visit focus: concerns in complex and/or prolonged anesthesia                           Consultation:Patient's PCP for a statement of optimization                            Patient  TO scheduled Medical Appointment:     Navigation: Tests Scheduled.                         Consults scheduled.                        Results will be tracked by Preop Clinic.                                  Electronically signed by Floridalma Martinez RN at 9/4/2019 11:20 AM                                    09/04/2019  Blessing Maddox is a 44 y.o., female.    Anesthesia Evaluation         Review of Systems  Anesthesia Hx:  No problems with previous Anesthesia History of prior surgery of interest to airway management or planning: Previous anesthesia: MAC  2/15/19 port placement with MAC.  Procedure performed at an [facility]. Denies Family Hx of Anesthesia complications.   Denies Personal Hx of Anesthesia complications.   Social:  Non-Smoker, No Alcohol Use    Hematology/Oncology:  Hematology Normal       Breast s/p chemotherapy and has subcutaneous implanted intravenous port  Chemotherapy: within last 3 months   Current/Recent Cancer.  --  Cancer in past history (uterine and treated with hysterectomy):    EENT/Dental:EENT/Dental Normal   Cardiovascular:    Denies Angina.  Functional Capacity good / => 4 METS  Hypertension , Well Controlled on Rx , Recent typical clinic B/P of 129/77    Pulmonary:   Denies Shortness of breath.  Denies Recent URI.  Possible Obstructive Sleep Apnea , (STOP/BANG) Symptoms S - Snoring (loud) and P - Pressure being treated for high BP    Renal/:  Renal/ Normal     Hepatic/GI:  Liver Disease, Fatty Liver    Musculoskeletal:  Musculoskeletal General/Symptoms: joint pain, joint stiffness, low back pain. Functional capacity is ambulatory without assistance. Right shoulder Lumbar Spine Disorders, Lumbar Disc Disease   Neurological:  Pain , onset is chronic , location of shoulder , precipitating factors are chemo port shifted-hits nerve in right shoulder , alleviating factors are lidocaine patch, daily gabapentin. TIA - Transient Ischemic Attack , Most recent TIA was on 2013 , has had 1 TIA , transient deficits are no residual deficit.   Endocrine:  Diabetes, Type 2 Diabetes , controlled by oral hypoglycemics, insulin, non-insulin injectables. Typical AM glucose range:  , most recent HgA1c value was 6.7 on 9/5/19.  Metabolic Disorders, Morbid Obesity / BMI > 40  Psych:  Psychiatric Normal           Physical Exam  General:  Well nourished    Airway/Jaw/Neck:  Airway Findings: Mouth Opening: Normal Tongue: Normal  Jaw/Neck Findings:  Neck ROM: Normal ROM      Dental:  Dental Findings: In tact   Chest/Lungs:  Chest/Lungs Findings: Clear to auscultation     Heart/Vascular:  Heart Findings: Rate: Normal  Rhythm: Regular Rhythm  Sounds: Normal        Mental Status:  Mental Status Findings:  Cooperative, Alert and Oriented         Anesthesia Plan  Type of Anesthesia, risks & benefits discussed:  Anesthesia Type:  general  Patient's Preference: General  Intra-op  Monitoring Plan: standard ASA monitors  Intra-op Monitoring Plan Comments: Standard ASA monitors.   Post Op Pain Control Plan: per primary service following discharge from PACU  Post Op Pain Control Plan Comments: Per primary service.     Induction:   IV  Beta Blocker:  Patient is not currently on a Beta-Blocker (No further documentation required).       Informed Consent: Patient understands risks and agrees with Anesthesia plan.  Questions answered. Anesthesia consent signed with patient.  ASA Score: 4     Day of Surgery Review of History & Physical:    H&P update referred to the surgeon.     Anesthesia Plan Notes: Chart reviewed, patient interviewed and examined.  The plan for general anesthesia was explained.  Questions were answered and the consent was signed.  Coral LAU         Ready For Surgery From Anesthesia Perspective.     9/5/19 Preop visit completed, lab results noted. NP to clear pt on 9/9/19.    9/9/19 clearance obtained and scanned to media.

## 2019-09-04 NOTE — TELEPHONE ENCOUNTER
----- Message from Floridalma Martinez, RN sent at 9/4/2019 10:57 AM CDT -----  NEEDS POC, LAB,  NEEDS TO MAKE APPOINTMENT WITH PCP FOR CLEARANCE ( PLEASE INFORM PT) SURGERY 9/12

## 2019-09-05 ENCOUNTER — HOSPITAL ENCOUNTER (OUTPATIENT)
Dept: PREADMISSION TESTING | Facility: HOSPITAL | Age: 44
Discharge: HOME | End: 2019-09-05
Attending: ANESTHESIOLOGY
Payer: MEDICAID

## 2019-09-05 ENCOUNTER — TELEPHONE (OUTPATIENT)
Dept: PLASTIC SURGERY | Facility: CLINIC | Age: 44
End: 2019-09-05

## 2019-09-05 VITALS
TEMPERATURE: 99 F | RESPIRATION RATE: 18 BRPM | WEIGHT: 243 LBS | DIASTOLIC BLOOD PRESSURE: 77 MMHG | SYSTOLIC BLOOD PRESSURE: 129 MMHG | HEART RATE: 101 BPM | HEIGHT: 63 IN | BODY MASS INDEX: 43.05 KG/M2 | OXYGEN SATURATION: 96 %

## 2019-09-05 NOTE — DISCHARGE INSTRUCTIONS
Your surgery has been scheduled for:__________________________________________    You should report to:  ____[facility], located on the [place] side of the first floor of the           [facility] ([phone])  ____[facility], located on the [facility] side of the            Second floor of the [facility] ([phone])  ____3rd Floor [facility] located on the [facility] side of the [facility] [phone]  Please Note   - Tell your doctor if you take Aspirin, products containing Aspirin, herbal medications  or blood thinners, such as Coumadin, Ticlid, or Plavix.  (Consult your provider regarding holding or stopping before surgery).  - Arrange for someone to drive you home following surgery.  You will not be allowed to leave the surgical facility alone or drive yourself home following sedation and anesthesia.  Before Surgery  - Stop taking all herbal medications 14days prior to surgery  - No Motrin/Advil (Ibuprofen) 7 days before surgery  - No Aleve (Naproxen) 7 days before surgery  - Stop Taking Asprin, products containing Asprin _____days before surgery  - Stop taking blood thinners_______days before surgery  - No Goody's/BC  Powder 7 days before surgery  - Refrain from drinking alcoholic beverages for 24hours before and after surgery  - Stop or limit smoking _________days before surgery  - You may take Tylenol for pain  Night before Surgery  Stop ALL solid food, gum, candy (including vitamins) 8 hours before arrival time.  (Please note: If your surgeon gives you different eating and drinking instructions, please follow surgeon's directions.)  Stop all CLOUDY liquids: coffee with creamer, formula, tube feeds, cloudy juices, non-human milk and breast milk with additives, 6 hours prior to arrival time.  Stop plain breast milk 4 hours prior to arrival time.  The patient should be ENCOURAGED to drink carbohydrate-rich  clear liquids (sports drinks, clear juices) until 2 hours prior to arrival time.  CLEAR liquids include only water, black coffee NO creamer, clear oral rehydration drinks, clear sports drinks or clear fruit juices (no orange juice, no pulpy juices, no apple cider). Advise patients if they can read newsprint through the liquid, it qualifies as clear liquid.   IF IN DOUBT, drink water instead.   - Take a shower or bath (shower is recommended).  Bathe with Hibiclens soap or an antibacterial soap from the neck down.  If not supplied by your surgeon, hibiclens soap will need to be purchased over the counter in pharmacy.  Rinse soap off thoroughly.  - Shampoo your hair with your regular shampoo  The Day of Surgery  · NOTHING TO  DRINK 2 hours before arrival time. If you are told to take medication on the morning of surgery, it may be taken with a sip of water.   - Take another bath or shower with hibiclens or any antibacterial soap, to reduce the chance of infection.  - Take heart and blood pressure medications with a small sip of water, as advised by the perioperative team.  - Do not take fluid pills  - You may brush your teeth and rinse your mouth, but do not swall any additional water.   - Do not apply perfumes, powder, body lotions or deodorant on the day of surgery.  - Nail polish should be removed.  - Do not wear makeup or moisturizer  - Wear comfortable clothes, such as a button front shirt and loose fitting pants.  - Leave all jewelry, including body piercings, and valuables at home.    - Bring any devices you will neeed after surgery such as crutches or canes.  - If you have sleep apnea, please bring your CPAP machine  In the event that your physical condition changes including the onset of a cold or respiratory illness, or if you have to delay or cancel your surgery, please notify your surgeon.  Anesthesia: General Anesthesia     You are watched continuously during your procedure by your anesthesia provider.      Youre due to have surgery. During surgery, youll be given medicine called anesthesia or anesthetic. This will keep you comfortable and pain-free. Your anesthesia provider will use general anesthesia.  What is general anesthesia?  General anesthesia puts you into a state like deep sleep. It goes into the bloodstream (IV anesthetics), into the lungs (gas anesthetics), or both. You feel nothing during the procedure. You will not remember it. During the procedure, the anesthesia provider monitors you continuously. He or she checks your heart rate and rhythm, blood pressure, breathing, and blood oxygen.  · IV anesthetics. IV anesthetics are given through an IV line in your arm. Theyre often given first. This is so you are asleep before a gas anesthetic is started. Some kinds of IV anesthetics relieve pain. Others relax you. Your doctor will decide which kind is best in your case.  · Gas anesthetics. Gas anesthetics are breathed into the lungs. They are often used to keep you asleep. They can be given through a facemask or a tube placed in your larynx or trachea (breathing tube).  ? If you have a facemask, your anesthesia provider will most likely place it over your nose and mouth while youre still awake. Youll breathe oxygen through the mask as your IV anesthetic is started. Gas anesthetic may be added through the mask.  ? If you have a tube in the larynx or trachea, it will be inserted into your throat after youre asleep.  Anesthesia tools and medicines  You will likely have:  · IV anesthetics. These are put into an IV line into your bloodstream.  · Gas anesthetics. You breathe these anesthetics into your lungs, where they pass into your bloodstream.  · Pulse oximeter. This is a small clip that is attached to the end of your finger. This measures your blood oxygen level.  · Electrocardiography leads (electrodes). These are small sticky pads that are placed on your chest. They record your heart rate and  rhythm.  · Blood pressure cuff. This reads your blood pressure.  Risks and possible complications  General anesthesia has some risks. These include:  · Breathing problems  · Nausea and vomiting  · Sore throat or hoarseness (usually temporary)  · Allergic reaction to the anesthetic  · Irregular heartbeat (rare)  · Cardiac arrest (rare)   Anesthesia safety  · Follow all instructions you are given for how long not to eat or drink before your procedure.  · Be sure your doctor knows what medicines and drugs you take. This includes over-the-counter medicines, herbs, supplements, alcohol or other drugs. You will be asked when those were last taken.  · Have an adult family member or friend drive you home after the procedure.  · For the first 24 hours after your surgery:  ? Do not drive or use heavy equipment.  ? Do not make important decisions or sign legal documents. If important decisions or signing legal documents is necessary during the first 24 hours after surgery, have a trusted family member or spouse act on your behalf.  ? Avoid alcohol.  ? Have a responsible adult stay with you. He or she can watch for problems and help keep you safe.  Date Last Reviewed: 12/1/2016  © 9144-1141 [employer]. [address] [place] [address], [place] [address]. All rights reserved. This information is not intended as a substitute for professional medical care. Always follow your healthcare professional's instructions

## 2019-09-11 ENCOUNTER — TELEPHONE (OUTPATIENT)
Dept: PLASTIC SURGERY | Facility: CLINIC | Age: 44
End: 2019-09-11

## 2019-09-11 NOTE — TELEPHONE ENCOUNTER
Pt. Informed to arrive at 830 am on 2nd floor [facility] for surgery scheduled 9/12/2019. Pt. verbalized understanding.

## 2019-09-12 ENCOUNTER — HOSPITAL ENCOUNTER (OUTPATIENT)
Facility: HOSPITAL | Age: 44
Discharge: HOME | DRG: 580 | End: 2019-09-13
Attending: SURGERY | Admitting: SURGERY
Payer: MEDICAID

## 2019-09-12 ENCOUNTER — HOSPITAL ENCOUNTER (OUTPATIENT)
Dept: RADIOLOGY | Facility: HOSPITAL | Age: 44
Discharge: HOME | DRG: 580 | End: 2019-09-12
Attending: SURGERY | Admitting: SURGERY
Payer: MEDICAID

## 2019-09-12 ENCOUNTER — ANESTHESIA (OUTPATIENT)
Dept: SURGERY | Facility: HOSPITAL | Age: 44
DRG: 580 | End: 2019-09-12
Payer: MEDICAID

## 2019-09-12 DIAGNOSIS — C50.919 TRIPLE NEGATIVE MALIGNANT NEOPLASM OF BREAST: ICD-10-CM

## 2019-09-12 DIAGNOSIS — C50.912 MALIGNANT NEOPLASM OF LEFT FEMALE BREAST, UNSPECIFIED ESTROGEN RECEPTOR STATUS, UNSPECIFIED SITE OF BREAST: Primary | ICD-10-CM

## 2019-09-12 LAB
POCT GLUCOSE: 101 MG/DL (ref 70–110)
POCT GLUCOSE: 125 MG/DL (ref 70–110)
POCT GLUCOSE: 163 MG/DL (ref 70–110)

## 2019-09-12 PROCEDURE — 25000003 PHARM REV CODE 250: Performed by: STUDENT IN AN ORGANIZED HEALTH CARE EDUCATION/TRAINING PROGRAM

## 2019-09-12 PROCEDURE — 63600175 PHARM REV CODE 636 W HCPCS: Mod: JG | Performed by: SURGERY

## 2019-09-12 PROCEDURE — 63600175 PHARM REV CODE 636 W HCPCS: Performed by: ANESTHESIOLOGY

## 2019-09-12 PROCEDURE — C1729 CATH, DRAINAGE: HCPCS | Performed by: SURGERY

## 2019-09-12 PROCEDURE — 88342 IMHCHEM/IMCYTCHM 1ST ANTB: CPT | Mod: 26,,, | Performed by: PATHOLOGY

## 2019-09-12 PROCEDURE — 00404 ANES INTEG SYS RAD/MODF BRST: CPT | Performed by: SURGERY

## 2019-09-12 PROCEDURE — 64461 PVB THORACIC SINGLE INJ SITE: CPT | Performed by: STUDENT IN AN ORGANIZED HEALTH CARE EDUCATION/TRAINING PROGRAM

## 2019-09-12 PROCEDURE — 36000706: Performed by: SURGERY

## 2019-09-12 PROCEDURE — S0077 INJECTION, CLINDAMYCIN PHOSP: HCPCS | Performed by: SURGERY

## 2019-09-12 PROCEDURE — 97605 NEG PRS WND THER DME<=50SQCM: CPT | Mod: ,,, | Performed by: SURGERY

## 2019-09-12 PROCEDURE — 25000003 PHARM REV CODE 250: Performed by: NURSE ANESTHETIST, CERTIFIED REGISTERED

## 2019-09-12 PROCEDURE — 88341 IMHCHEM/IMCYTCHM EA ADD ANTB: CPT | Mod: 26,,, | Performed by: PATHOLOGY

## 2019-09-12 PROCEDURE — 27201423 OPTIME MED/SURG SUP & DEVICES STERILE SUPPLY: Performed by: SURGERY

## 2019-09-12 PROCEDURE — 36000707: Performed by: SURGERY

## 2019-09-12 PROCEDURE — 37000008 HC ANESTHESIA 1ST 15 MINUTES: Performed by: SURGERY

## 2019-09-12 PROCEDURE — 27200671 HC STIMUCATH NEEDLE/ CATHETER: Performed by: STUDENT IN AN ORGANIZED HEALTH CARE EDUCATION/TRAINING PROGRAM

## 2019-09-12 PROCEDURE — 19307 PR MASTECTOMY, MODIFIED RADICAL: ICD-10-PCS | Mod: LT,,, | Performed by: SURGERY

## 2019-09-12 PROCEDURE — 64461 ERECTOR SPINAE SINGLE SHOT: ICD-10-PCS | Mod: 50,59,, | Performed by: ANESTHESIOLOGY

## 2019-09-12 PROCEDURE — 63600175 PHARM REV CODE 636 W HCPCS: Performed by: NURSE ANESTHETIST, CERTIFIED REGISTERED

## 2019-09-12 PROCEDURE — 88307 TISSUE EXAM BY PATHOLOGIST: CPT | Mod: 26,,, | Performed by: PATHOLOGY

## 2019-09-12 PROCEDURE — D9220A PRA ANESTHESIA: ICD-10-PCS | Mod: ANES,,, | Performed by: ANESTHESIOLOGY

## 2019-09-12 PROCEDURE — C1789 PROSTHESIS, BREAST, IMP: HCPCS | Performed by: SURGERY

## 2019-09-12 PROCEDURE — A4216 STERILE WATER/SALINE, 10 ML: HCPCS | Performed by: SURGERY

## 2019-09-12 PROCEDURE — 88331 TISSUE SPECIMEN TO PATHOLOGY - SURGERY: ICD-10-PCS | Mod: 26,,, | Performed by: PATHOLOGY

## 2019-09-12 PROCEDURE — 88307 TISSUE SPECIMEN TO PATHOLOGY - SURGERY: ICD-10-PCS | Mod: 26,,, | Performed by: PATHOLOGY

## 2019-09-12 PROCEDURE — 37000009 HC ANESTHESIA EA ADD 15 MINS: Performed by: SURGERY

## 2019-09-12 PROCEDURE — 11000001 HC ACUTE MED/SURG PRIVATE ROOM

## 2019-09-12 PROCEDURE — 19303 PR MASTECTOMY, SIMPLE, COMPLETE: ICD-10-PCS | Mod: 59,RT,, | Performed by: SURGERY

## 2019-09-12 PROCEDURE — 71000033 HC RECOVERY, INTIAL HOUR: Performed by: SURGERY

## 2019-09-12 PROCEDURE — 27200665 HC NERVE BLOCK NEEDLE/ CATHETER: Performed by: STUDENT IN AN ORGANIZED HEALTH CARE EDUCATION/TRAINING PROGRAM

## 2019-09-12 PROCEDURE — D9220A PRA ANESTHESIA: Mod: ANES,,, | Performed by: ANESTHESIOLOGY

## 2019-09-12 PROCEDURE — A9520 TC99 TILMANOCEPT DIAG 0.5MCI: HCPCS

## 2019-09-12 PROCEDURE — 64461 PVB THORACIC SINGLE INJ SITE: CPT | Mod: 50,59,, | Performed by: ANESTHESIOLOGY

## 2019-09-12 PROCEDURE — 88307 TISSUE EXAM BY PATHOLOGIST: CPT | Performed by: PATHOLOGY

## 2019-09-12 PROCEDURE — 76942 ECHO GUIDE FOR BIOPSY: CPT | Performed by: STUDENT IN AN ORGANIZED HEALTH CARE EDUCATION/TRAINING PROGRAM

## 2019-09-12 PROCEDURE — 63600175 PHARM REV CODE 636 W HCPCS: Performed by: STUDENT IN AN ORGANIZED HEALTH CARE EDUCATION/TRAINING PROGRAM

## 2019-09-12 PROCEDURE — 19357 PR BREAST RECONSTRUC W TISS EXPANDR: ICD-10-PCS | Mod: 50,,, | Performed by: SURGERY

## 2019-09-12 PROCEDURE — 38900 PR INTRAOPERATIVE SENTINEL LYMPH NODE ID W DYE INJECTION: ICD-10-PCS | Mod: LT,,, | Performed by: SURGERY

## 2019-09-12 PROCEDURE — 19303 MAST SIMPLE COMPLETE: CPT | Mod: 59,RT,, | Performed by: SURGERY

## 2019-09-12 PROCEDURE — 19307 MAST MOD RAD: CPT | Mod: LT,,, | Performed by: SURGERY

## 2019-09-12 PROCEDURE — D9220A PRA ANESTHESIA: ICD-10-PCS | Mod: CRNA,,, | Performed by: NURSE ANESTHETIST, CERTIFIED REGISTERED

## 2019-09-12 PROCEDURE — 82962 GLUCOSE BLOOD TEST: CPT | Performed by: SURGERY

## 2019-09-12 PROCEDURE — 38900 IO MAP OF SENT LYMPH NODE: CPT | Mod: LT,,, | Performed by: SURGERY

## 2019-09-12 PROCEDURE — D9220A PRA ANESTHESIA: Mod: CRNA,,, | Performed by: NURSE ANESTHETIST, CERTIFIED REGISTERED

## 2019-09-12 PROCEDURE — 19357 TISS XPNDR PLMT BRST RCNSTJ: CPT | Mod: 50,,, | Performed by: SURGERY

## 2019-09-12 PROCEDURE — 88331 PATH CONSLTJ SURG 1 BLK 1SPC: CPT | Mod: 26,,, | Performed by: PATHOLOGY

## 2019-09-12 PROCEDURE — 97605 PR NEG PRESS WOUND THERAPY (NPWT) W/NON-DISPOSABLE WOUND VAC DEVICE (DME), <=50 CM: ICD-10-PCS | Mod: ,,, | Performed by: SURGERY

## 2019-09-12 PROCEDURE — 25000003 PHARM REV CODE 250: Performed by: SURGERY

## 2019-09-12 PROCEDURE — 88341 PR IHC OR ICC EACH ADD'L SINGLE ANTIBODY  STAINPR: ICD-10-PCS | Mod: 26,,, | Performed by: PATHOLOGY

## 2019-09-12 PROCEDURE — 88342 IMHCHEM/IMCYTCHM 1ST ANTB: CPT | Mod: 59 | Performed by: PATHOLOGY

## 2019-09-12 PROCEDURE — 88342 TISSUE SPECIMEN TO PATHOLOGY - SURGERY: ICD-10-PCS | Mod: 26,,, | Performed by: PATHOLOGY

## 2019-09-12 PROCEDURE — 25000003 PHARM REV CODE 250: Performed by: ANESTHESIOLOGY

## 2019-09-12 RX ORDER — GLUCAGON 1 MG
1 KIT INJECTION
Status: DISCONTINUED | OUTPATIENT
Start: 2019-09-12 | End: 2019-09-13 | Stop reason: HOSPADM

## 2019-09-12 RX ORDER — LIDOCAINE HYDROCHLORIDE 10 MG/ML
1 INJECTION, SOLUTION EPIDURAL; INFILTRATION; INTRACAUDAL; PERINEURAL ONCE
Status: DISCONTINUED | OUTPATIENT
Start: 2019-09-12 | End: 2019-09-12 | Stop reason: HOSPADM

## 2019-09-12 RX ORDER — DIPHENHYDRAMINE HYDROCHLORIDE 50 MG/ML
25 INJECTION INTRAMUSCULAR; INTRAVENOUS EVERY 6 HOURS PRN
Status: DISCONTINUED | OUTPATIENT
Start: 2019-09-12 | End: 2019-09-12 | Stop reason: HOSPADM

## 2019-09-12 RX ORDER — SODIUM CHLORIDE 9 MG/ML
INJECTION, SOLUTION INTRAVENOUS CONTINUOUS
Status: DISCONTINUED | OUTPATIENT
Start: 2019-09-12 | End: 2019-09-13 | Stop reason: HOSPADM

## 2019-09-12 RX ORDER — OXYCODONE HYDROCHLORIDE 10 MG/1
10 TABLET ORAL EVERY 4 HOURS PRN
Status: DISCONTINUED | OUTPATIENT
Start: 2019-09-12 | End: 2019-09-13 | Stop reason: HOSPADM

## 2019-09-12 RX ORDER — BACITRACIN 50000 [IU]/1
INJECTION, POWDER, FOR SOLUTION INTRAMUSCULAR
Status: DISCONTINUED | OUTPATIENT
Start: 2019-09-12 | End: 2019-09-12 | Stop reason: HOSPADM

## 2019-09-12 RX ORDER — RAMELTEON 8 MG/1
8 TABLET ORAL NIGHTLY PRN
Status: DISCONTINUED | OUTPATIENT
Start: 2019-09-12 | End: 2019-09-13 | Stop reason: HOSPADM

## 2019-09-12 RX ORDER — BUPIVACAINE HYDROCHLORIDE AND EPINEPHRINE 5; 5 MG/ML; UG/ML
INJECTION, SOLUTION EPIDURAL; INTRACAUDAL; PERINEURAL
Status: COMPLETED | OUTPATIENT
Start: 2019-09-12 | End: 2019-09-12

## 2019-09-12 RX ORDER — ROCURONIUM BROMIDE 10 MG/ML
INJECTION, SOLUTION INTRAVENOUS
Status: DISCONTINUED | OUTPATIENT
Start: 2019-09-12 | End: 2019-09-12

## 2019-09-12 RX ORDER — IBUPROFEN 400 MG/1
800 TABLET ORAL 3 TIMES DAILY
Status: DISCONTINUED | OUTPATIENT
Start: 2019-09-12 | End: 2019-09-13 | Stop reason: HOSPADM

## 2019-09-12 RX ORDER — SODIUM CHLORIDE 9 MG/ML
INJECTION, SOLUTION INTRAVENOUS CONTINUOUS PRN
Status: DISCONTINUED | OUTPATIENT
Start: 2019-09-12 | End: 2019-09-12

## 2019-09-12 RX ORDER — MIDAZOLAM HYDROCHLORIDE 1 MG/ML
0.5 INJECTION INTRAMUSCULAR; INTRAVENOUS
Status: DISCONTINUED | OUTPATIENT
Start: 2019-09-12 | End: 2019-09-12 | Stop reason: HOSPADM

## 2019-09-12 RX ORDER — PROPOFOL 10 MG/ML
VIAL (ML) INTRAVENOUS
Status: DISCONTINUED | OUTPATIENT
Start: 2019-09-12 | End: 2019-09-12

## 2019-09-12 RX ORDER — FENTANYL CITRATE 50 UG/ML
INJECTION, SOLUTION INTRAMUSCULAR; INTRAVENOUS
Status: DISCONTINUED | OUTPATIENT
Start: 2019-09-12 | End: 2019-09-12

## 2019-09-12 RX ORDER — DOCUSATE SODIUM 100 MG/1
100 CAPSULE, LIQUID FILLED ORAL EVERY 12 HOURS
Status: DISCONTINUED | OUTPATIENT
Start: 2019-09-12 | End: 2019-09-13 | Stop reason: HOSPADM

## 2019-09-12 RX ORDER — ONDANSETRON 2 MG/ML
INJECTION INTRAMUSCULAR; INTRAVENOUS
Status: DISCONTINUED | OUTPATIENT
Start: 2019-09-12 | End: 2019-09-12

## 2019-09-12 RX ORDER — INSULIN ASPART 100 [IU]/ML
INJECTION, SOLUTION INTRAVENOUS; SUBCUTANEOUS
Status: DISPENSED
Start: 2019-09-12 | End: 2019-09-13

## 2019-09-12 RX ORDER — HYDROMORPHONE HYDROCHLORIDE 1 MG/ML
0.2 INJECTION, SOLUTION INTRAMUSCULAR; INTRAVENOUS; SUBCUTANEOUS EVERY 5 MIN PRN
Status: DISCONTINUED | OUTPATIENT
Start: 2019-09-12 | End: 2019-09-12 | Stop reason: HOSPADM

## 2019-09-12 RX ORDER — CEFAZOLIN SODIUM 1 G/3ML
2 INJECTION, POWDER, FOR SOLUTION INTRAMUSCULAR; INTRAVENOUS
Status: COMPLETED | OUTPATIENT
Start: 2019-09-12 | End: 2019-09-12

## 2019-09-12 RX ORDER — INSULIN ASPART 100 [IU]/ML
1-10 INJECTION, SOLUTION INTRAVENOUS; SUBCUTANEOUS
Status: DISCONTINUED | OUTPATIENT
Start: 2019-09-12 | End: 2019-09-12

## 2019-09-12 RX ORDER — ACETAMINOPHEN 10 MG/ML
INJECTION, SOLUTION INTRAVENOUS
Status: DISCONTINUED | OUTPATIENT
Start: 2019-09-12 | End: 2019-09-12

## 2019-09-12 RX ORDER — ISOSULFAN BLUE 50 MG/5ML
INJECTION, SOLUTION SUBCUTANEOUS
Status: DISCONTINUED | OUTPATIENT
Start: 2019-09-12 | End: 2019-09-12 | Stop reason: HOSPADM

## 2019-09-12 RX ORDER — GABAPENTIN 300 MG/1
600 CAPSULE ORAL 3 TIMES DAILY
Status: DISCONTINUED | OUTPATIENT
Start: 2019-09-12 | End: 2019-09-13 | Stop reason: HOSPADM

## 2019-09-12 RX ORDER — MUPIROCIN 20 MG/G
1 OINTMENT TOPICAL 2 TIMES DAILY
Status: DISCONTINUED | OUTPATIENT
Start: 2019-09-12 | End: 2019-09-13 | Stop reason: HOSPADM

## 2019-09-12 RX ORDER — IBUPROFEN 200 MG
16 TABLET ORAL
Status: DISCONTINUED | OUTPATIENT
Start: 2019-09-12 | End: 2019-09-13 | Stop reason: HOSPADM

## 2019-09-12 RX ORDER — OXYCODONE HYDROCHLORIDE 5 MG/1
5 TABLET ORAL EVERY 4 HOURS PRN
Status: DISCONTINUED | OUTPATIENT
Start: 2019-09-12 | End: 2019-09-13 | Stop reason: HOSPADM

## 2019-09-12 RX ORDER — IBUPROFEN 200 MG
24 TABLET ORAL
Status: DISCONTINUED | OUTPATIENT
Start: 2019-09-12 | End: 2019-09-13 | Stop reason: HOSPADM

## 2019-09-12 RX ORDER — METFORMIN HYDROCHLORIDE 500 MG/1
1000 TABLET ORAL 2 TIMES DAILY WITH MEALS
Status: DISCONTINUED | OUTPATIENT
Start: 2019-09-12 | End: 2019-09-13 | Stop reason: HOSPADM

## 2019-09-12 RX ORDER — SULFAMETHOXAZOLE AND TRIMETHOPRIM 800; 160 MG/1; MG/1
1 TABLET ORAL 2 TIMES DAILY
Status: DISCONTINUED | OUTPATIENT
Start: 2019-09-12 | End: 2019-09-13 | Stop reason: HOSPADM

## 2019-09-12 RX ORDER — ONDANSETRON 8 MG/1
8 TABLET, ORALLY DISINTEGRATING ORAL EVERY 8 HOURS PRN
Status: DISCONTINUED | OUTPATIENT
Start: 2019-09-12 | End: 2019-09-13 | Stop reason: HOSPADM

## 2019-09-12 RX ORDER — FENTANYL CITRATE 50 UG/ML
25 INJECTION, SOLUTION INTRAMUSCULAR; INTRAVENOUS EVERY 5 MIN PRN
Status: DISCONTINUED | OUTPATIENT
Start: 2019-09-12 | End: 2019-09-12 | Stop reason: HOSPADM

## 2019-09-12 RX ORDER — ATORVASTATIN CALCIUM 20 MG/1
20 TABLET, FILM COATED ORAL DAILY
Status: DISCONTINUED | OUTPATIENT
Start: 2019-09-13 | End: 2019-09-13 | Stop reason: HOSPADM

## 2019-09-12 RX ORDER — LIDOCAINE HCL/PF 100 MG/5ML
SYRINGE (ML) INTRAVENOUS
Status: DISCONTINUED | OUTPATIENT
Start: 2019-09-12 | End: 2019-09-12

## 2019-09-12 RX ORDER — FENTANYL CITRATE 50 UG/ML
25 INJECTION, SOLUTION INTRAMUSCULAR; INTRAVENOUS EVERY 5 MIN PRN
Status: COMPLETED | OUTPATIENT
Start: 2019-09-12 | End: 2019-09-12

## 2019-09-12 RX ORDER — CYCLOBENZAPRINE HCL 10 MG
10 TABLET ORAL 3 TIMES DAILY
Status: DISCONTINUED | OUTPATIENT
Start: 2019-09-12 | End: 2019-09-13 | Stop reason: HOSPADM

## 2019-09-12 RX ORDER — GLYCOPYRROLATE 0.2 MG/ML
INJECTION INTRAMUSCULAR; INTRAVENOUS
Status: DISCONTINUED | OUTPATIENT
Start: 2019-09-12 | End: 2019-09-12

## 2019-09-12 RX ORDER — MIDAZOLAM HYDROCHLORIDE 1 MG/ML
INJECTION, SOLUTION INTRAMUSCULAR; INTRAVENOUS
Status: DISCONTINUED | OUTPATIENT
Start: 2019-09-12 | End: 2019-09-12

## 2019-09-12 RX ORDER — SODIUM CHLORIDE 9 MG/ML
INJECTION, SOLUTION INTRAMUSCULAR; INTRAVENOUS; SUBCUTANEOUS
Status: DISCONTINUED | OUTPATIENT
Start: 2019-09-12 | End: 2019-09-12 | Stop reason: HOSPADM

## 2019-09-12 RX ORDER — MUPIROCIN 20 MG/G
OINTMENT TOPICAL
Status: DISCONTINUED | OUTPATIENT
Start: 2019-09-12 | End: 2019-09-12 | Stop reason: HOSPADM

## 2019-09-12 RX ORDER — ACETAMINOPHEN 325 MG/1
650 TABLET ORAL EVERY 6 HOURS
Status: DISCONTINUED | OUTPATIENT
Start: 2019-09-12 | End: 2019-09-13 | Stop reason: HOSPADM

## 2019-09-12 RX ORDER — PHENYLEPHRINE HYDROCHLORIDE 10 MG/ML
INJECTION INTRAVENOUS
Status: DISCONTINUED | OUTPATIENT
Start: 2019-09-12 | End: 2019-09-12

## 2019-09-12 RX ORDER — NEOSTIGMINE METHYLSULFATE 0.5 MG/ML
INJECTION, SOLUTION INTRAVENOUS
Status: DISCONTINUED | OUTPATIENT
Start: 2019-09-12 | End: 2019-09-12

## 2019-09-12 RX ORDER — OXYCODONE HYDROCHLORIDE 5 MG/1
TABLET ORAL
Status: DISPENSED
Start: 2019-09-12 | End: 2019-09-13

## 2019-09-12 RX ORDER — SODIUM CHLORIDE 0.9 % (FLUSH) 0.9 %
10 SYRINGE (ML) INJECTION
Status: DISCONTINUED | OUTPATIENT
Start: 2019-09-12 | End: 2019-09-12 | Stop reason: HOSPADM

## 2019-09-12 RX ADMIN — BUPIVACAINE HYDROCHLORIDE AND EPINEPHRINE BITARTRATE 30 ML: 5; .005 INJECTION, SOLUTION EPIDURAL; INTRACAUDAL; PERINEURAL at 10:09

## 2019-09-12 RX ADMIN — FENTANYL CITRATE 100 MCG: 50 INJECTION, SOLUTION INTRAMUSCULAR; INTRAVENOUS at 10:09

## 2019-09-12 RX ADMIN — OXYCODONE HYDROCHLORIDE 10 MG: 10 TABLET ORAL at 10:09

## 2019-09-12 RX ADMIN — FENTANYL CITRATE 25 MCG: 50 INJECTION INTRAMUSCULAR; INTRAVENOUS at 04:09

## 2019-09-12 RX ADMIN — ONDANSETRON 4 MG: 2 INJECTION INTRAMUSCULAR; INTRAVENOUS at 03:09

## 2019-09-12 RX ADMIN — INSULIN ASPART 2 UNITS: 100 INJECTION, SOLUTION INTRAVENOUS; SUBCUTANEOUS at 04:09

## 2019-09-12 RX ADMIN — GLYCOPYRROLATE 0.3 MG: 0.2 INJECTION, SOLUTION INTRAMUSCULAR; INTRAVENOUS at 03:09

## 2019-09-12 RX ADMIN — IBUPROFEN 800 MG: 400 TABLET, FILM COATED ORAL at 08:09

## 2019-09-12 RX ADMIN — FENTANYL CITRATE 25 MCG: 50 INJECTION, SOLUTION INTRAMUSCULAR; INTRAVENOUS at 03:09

## 2019-09-12 RX ADMIN — OXYCODONE HYDROCHLORIDE 10 MG: 10 TABLET ORAL at 04:09

## 2019-09-12 RX ADMIN — SODIUM CHLORIDE, SODIUM GLUCONATE, SODIUM ACETATE, POTASSIUM CHLORIDE, MAGNESIUM CHLORIDE, SODIUM PHOSPHATE, DIBASIC, AND POTASSIUM PHOSPHATE: .53; .5; .37; .037; .03; .012; .00082 INJECTION, SOLUTION INTRAVENOUS at 11:09

## 2019-09-12 RX ADMIN — PROPOFOL 50 MG: 10 INJECTION, EMULSION INTRAVENOUS at 11:09

## 2019-09-12 RX ADMIN — SODIUM CHLORIDE: 0.9 INJECTION, SOLUTION INTRAVENOUS at 10:09

## 2019-09-12 RX ADMIN — GABAPENTIN 600 MG: 300 CAPSULE ORAL at 08:09

## 2019-09-12 RX ADMIN — HYDROMORPHONE HYDROCHLORIDE 0.2 MG: 1 INJECTION, SOLUTION INTRAMUSCULAR; INTRAVENOUS; SUBCUTANEOUS at 05:09

## 2019-09-12 RX ADMIN — ONDANSETRON 8 MG: 8 TABLET, ORALLY DISINTEGRATING ORAL at 07:09

## 2019-09-12 RX ADMIN — ROCURONIUM BROMIDE 10 MG: 10 INJECTION, SOLUTION INTRAVENOUS at 11:09

## 2019-09-12 RX ADMIN — CEFAZOLIN 2 G: 330 INJECTION, POWDER, FOR SOLUTION INTRAMUSCULAR; INTRAVENOUS at 10:09

## 2019-09-12 RX ADMIN — PHENYLEPHRINE HYDROCHLORIDE 200 MCG: 10 INJECTION INTRAVENOUS at 12:09

## 2019-09-12 RX ADMIN — MIDAZOLAM 2 MG: 1 INJECTION INTRAMUSCULAR; INTRAVENOUS at 09:09

## 2019-09-12 RX ADMIN — CYCLOBENZAPRINE HYDROCHLORIDE 10 MG: 10 TABLET, FILM COATED ORAL at 08:09

## 2019-09-12 RX ADMIN — ROCURONIUM BROMIDE 30 MG: 10 INJECTION, SOLUTION INTRAVENOUS at 10:09

## 2019-09-12 RX ADMIN — ACETAMINOPHEN 1000 MG: 10 INJECTION, SOLUTION INTRAVENOUS at 12:09

## 2019-09-12 RX ADMIN — CEFAZOLIN 2 G: 330 INJECTION, POWDER, FOR SOLUTION INTRAMUSCULAR; INTRAVENOUS at 03:09

## 2019-09-12 RX ADMIN — LIDOCAINE HYDROCHLORIDE 100 MG: 20 INJECTION, SOLUTION INTRAVENOUS at 10:09

## 2019-09-12 RX ADMIN — DOCUSATE SODIUM 100 MG: 100 CAPSULE, LIQUID FILLED ORAL at 08:09

## 2019-09-12 RX ADMIN — FENTANYL CITRATE 50 MCG: 50 INJECTION, SOLUTION INTRAMUSCULAR; INTRAVENOUS at 02:09

## 2019-09-12 RX ADMIN — FENTANYL CITRATE 50 MCG: 50 INJECTION INTRAMUSCULAR; INTRAVENOUS at 10:09

## 2019-09-12 RX ADMIN — PHENYLEPHRINE HYDROCHLORIDE 200 MCG: 10 INJECTION INTRAVENOUS at 01:09

## 2019-09-12 RX ADMIN — FENTANYL CITRATE 50 MCG: 50 INJECTION, SOLUTION INTRAMUSCULAR; INTRAVENOUS at 12:09

## 2019-09-12 RX ADMIN — PROPOFOL 180 MG: 10 INJECTION, EMULSION INTRAVENOUS at 10:09

## 2019-09-12 RX ADMIN — MUPIROCIN: 20 OINTMENT TOPICAL at 10:09

## 2019-09-12 RX ADMIN — SULFAMETHOXAZOLE AND TRIMETHOPRIM 1 TABLET: 800; 160 TABLET ORAL at 08:09

## 2019-09-12 RX ADMIN — MIDAZOLAM HYDROCHLORIDE 2 MG: 1 INJECTION, SOLUTION INTRAMUSCULAR; INTRAVENOUS at 10:09

## 2019-09-12 RX ADMIN — SODIUM CHLORIDE, SODIUM GLUCONATE, SODIUM ACETATE, POTASSIUM CHLORIDE, MAGNESIUM CHLORIDE, SODIUM PHOSPHATE, DIBASIC, AND POTASSIUM PHOSPHATE: .53; .5; .37; .037; .03; .012; .00082 INJECTION, SOLUTION INTRAVENOUS at 01:09

## 2019-09-12 RX ADMIN — METFORMIN HYDROCHLORIDE 1000 MG: 500 TABLET, FILM COATED ORAL at 07:09

## 2019-09-12 RX ADMIN — NEOSTIGMINE METHYLSULFATE 3 MG: 0.5 INJECTION INTRAVENOUS at 03:09

## 2019-09-12 RX ADMIN — FENTANYL CITRATE 50 MCG: 50 INJECTION, SOLUTION INTRAMUSCULAR; INTRAVENOUS at 11:09

## 2019-09-12 NOTE — BRIEF OP NOTE
[facility]  Brief Operative Note    SUMMARY     Surgery Date: 9/12/2019     Surgeon(s) and Role:  Panel 1:     * Holden Abernathy MD - Primary  Panel 2:     * Mary Soriano MD - Primary    Assisting Surgeon: None    Pre-op Diagnosis:  Malignant neoplasm of lower-outer quadrant of left breast of female, estrogen receptor negative [C50.512, Z17.1]    Post-op Diagnosis:  Post-Op Diagnosis Codes:     * Malignant neoplasm of lower-outer quadrant of left breast of female, estrogen receptor negative [C50.512, Z17.1]    Procedure(s) (LRB):  INSERTION, TISSUE EXPANDER, BREAST (Bilateral)  MASTECTOMY-skin sparing (Bilateral)  INJECTION, FOR SENTINEL NODE IDENTIFICATION (Left)  BIOPSY, LYMPH NODE, SENTINEL (Left)  LYMPHADENECTOMY, AXILLARY (Left)    Anesthesia: General    Description of Procedure: Insertion of bilateral tissue expanders. Filled 540 on right and 660 on left. Placement of incisional wound vac over incisions.    Description of the findings of the procedure: Same as above    Estimated Blood Loss: 100 mL         Specimens:   Specimen (12h ago, onward)    Start     Ordered    09/12/19 1444  Specimen to Pathology - Surgery  Once     Comments:  Pre-op Diagnosis: Malignant neoplasm of lower-outer quadrant of left breast of female, estrogen receptor negative [C50.512, Z17.1]Post-op Diagnosis: Malignant neoplasm of lower-outer quadrant of left breast of female, estrogen receptor negative [C50.512, Z17.1]Procedure(s):INSERTION, TISSUE EXPANDER, BREASTMASTECTOMY-skin sparingINJECTION, FOR SENTINEL NODE IDENTIFICATIONBIOPSY, LYMPH NODE, SENTINELLYMPHADENECTOMY, AXILLARY Number of specimens: 6Name of specimens: 1.) Right Breast short stitch superior, long stitch lateral- Permanent.2.) Left Axillary Lafferty Lymph node hot 121 - Frozen.( out to patho with Domenica at 1307)3.) Left Axillary Lafferty Lymph node hot 312 and Blue- Frozen.( out to patho with Domenica at 1307)4.) Left Axillary Lafferty  Lymph node hot 148 and Blue - Frozen ( out to patho with Royer at 1327)5.) Left Breast Short stitch superior and Long stitch lateral - Permanent.6.) Left Axillary Contents - Permanent.     Start Status     09/12/19 1444 Collected (09/12/19 1449) Order ID: [id number]       09/12/19 1448

## 2019-09-12 NOTE — NURSING TRANSFER
Nursing Transfer Note      9/12/2019     Transfer To: 503    Transfer via bed    Transfer with wound vac    Transported by rn and rn    Medicines sent: none    Chart send with patient: Yes    Notified: family    Patient reassessed at: 9/12/19

## 2019-09-12 NOTE — H&P
General Surgery  History & Physical        Subjective:      Treatment Summary:              L breast TNC Stage 1. W4sT2Wi. 1.2cm, dx 2019.               s/p neoadjuvant chemo (AC x 4,taxol x 12)                           last cycle 7/15/19              Genetics negative. VUS: APC, RAD51C.      Interval Hx:  Patient has been doing well. She has finished her last chemotherapy. She presents to clinic today to discuss her options for surgery. She states that she desires a bilateral mastectomy with reconstruction. We discussed that with her elevated blood glucoses, body habitus and timing that she would need her surgery performed, that immediate reconstruction may not be an option for her. She still wishes to pursue this.      Initial HPI:   Blessing Maddox is a pleasant 43 y.o. woman who presents to clinic for newly diagnosed left triple negative breast cancer .     She discovered a lump 4-5 months ago.  MMG and US were then performed.     Scn MMG 19 shows focal asymmetry lower outer quadrant of left breast 9 cm from nipple, and benign axillary LN.     Dx MMG 19 shows 12 mm mass LOQ of left breast, 9 CFN, posterior depth  Dx US 19 shows 7 mm mass LOQ, benign appearing axillary LN, 4OC, 1 cm from skin     Biopsy 19 triple negative IDC, grade 3, Ki67 90%  Post bx MMG confirmed placement     No personal history of breast cancer or breast biopsies.   Had endometrial cancer in 2015 s/p BSO  Family history negative for breast cancer.    Ovarian cancer in maternal great aunt in 50s.  Uterine cancer in maternal aunt      , first child at age 20  Menarche: 13  Menopause status: Surgical,   OCP use: None  HRT use: None     PMH: breast cancer, DM poorly controlled- last A1c 9.8, HLD, HTN, TIA, endometrial cancer  PSH: port placement (2/15/19), RIVKA-BSO , cholecystectomy ()  Family history: COPD, HTN, stroke, sleep apnea (mother)  Social history: Never smoker     Current Facility-Administered  Medications on File Prior to Encounter   Medication Dose Route Frequency Provider Last Rate Last Dose    diphenhydrAMINE (BENADRYL) 50 mg in sodium chloride 0.9% 50 mL IVPB  50 mg Intravenous 1 time in Clinic/HOD Melvin Chisholm MD        heparin, porcine (PF) 100 unit/mL injection flush 500 Units  500 Units Intravenous PRN Melvin Chisholm MD        heparin, porcine (PF) 100 unit/mL injection flush 500 Units  500 Units Intravenous PRN Melvin Chisholm MD   500 Units at 06/24/19 1017    lactated ringers infusion   Intravenous Continuous Nathalie Vickers MD        lidocaine (PF) 10 mg/ml (1%) injection 10 mg  1 mL Intradermal Once Nathalie Vickers MD        ondansetron disintegrating tablet 8 mg  8 mg Oral Once PRN Nathalie Vickers MD        PACLitaxel (TAXOL) 80 mg/m2 = 180 mg in sodium chloride 0.9% 280 mL chemo infusion  80 mg/m2 (Treatment Plan Recorded) Intravenous 1 time in Clinic/HOD Melvin Chisholm MD        palonosetron (ALOXI) 0.25 mg, dexamethasone (DECADRON) 10 mg in sodium chloride 0.9% 50 mL IVPB   Intravenous 1 time in Clinic/HOD Melvin Chisholm MD         Current Outpatient Medications on File Prior to Encounter   Medication Sig Dispense Refill    atorvastatin (LIPITOR) 20 MG tablet Take 1 tablet (20 mg total) by mouth once daily. 90 tablet 3    gabapentin (NEURONTIN) 300 MG capsule Take 2 capsules (600 mg total) by mouth 3 (three) times daily. 180 capsule 3    insulin (LANTUS SOLOSTAR U-100 INSULIN) glargine 100 units/mL (3mL) SubQ pen Inject 60 Units into the skin every evening. 18 mL 11    lidocaine (LIDODERM) 5 % Place 1 patch onto the skin daily as needed. Remove & Discard patch within 12 hours or as directed by MD as needed for pain. 30 patch 5    liraglutide 0.6 mg/0.1 mL, 18 mg/3 mL, subq PNIJ (VICTOZA 2-BRITTANY) 0.6 mg/0.1 mL (18 mg/3 mL) PnIj Inject 1.8 mg into the skin once daily. 9 mL 11    lisinopril 10 MG tablet Take 1 tablet (10 mg total) by mouth once  "daily. 90 tablet 3    metFORMIN (GLUCOPHAGE) 1000 MG tablet Take 1 tablet (1,000 mg total) by mouth 2 (two) times daily with meals. 180 tablet 3    ondansetron (ZOFRAN-ODT) 8 MG TbDL Take 1 tablet (8 mg total) by mouth every 8 (eight) hours as needed. 90 tablet 3    insulin syringe-needle U-100 0.3 mL 31 gauge x 5/16" Syrg 1 each by Misc.(Non-Drug; Combo Route) route 3 (three) times daily with meals. 100 each 5    ONETOUCH DELICA LANCETS 33 gauge Misc Inject 1 lancet into the skin 4 (four) times daily as needed. 200 each 5    ONETOUCH ULTRA BLUE TEST STRIP Strp 1 strip by Misc.(Non-Drug; Combo Route) route 4 (four) times daily as needed. 200 strip 5    ONETOUCH ULTRA2 kit U TO TEST BLOOD SUGAR D  2    pen needle, diabetic 33 gauge x 5/32" Ndle 1 each by Misc.(Non-Drug; Combo Route) route every evening. 100 each 5     Review of patient's allergies indicates:   Allergen Reactions    Ciprofloxacin Other (See Comments)     Made pt lose eye sight for seven days    Flagyl [metronidazole] Swelling     THROAT     Objective:      Vital Signs (Most Recent):  Temp: 97.7 °F (36.5 °C) (08/09/19 0958)  Pulse: (!) 114 (08/09/19 0958)  Resp: 20 (08/09/19 0958)  BP: (!) 142/91 (08/09/19 0958)      Weight: 112.3 kg (247 lb 9.2 oz)  Body mass index is 43.86 kg/m².     Physical exam:  General: no acute distress  Neuro: awake, alert, oriented x3  Cardio: RRR, right chest port a cath incision well healed   Resp: Moving air appropriately, breathing even and unlabored  Breast exam:  Physical Exam   Pulmonary/Chest:       Abd: Soft, non-tender, non-distended, no palpable masses  Ext: Warm and well perfused     Labs:  Reviewed      Imaging:  Reviewed      Pathology: Reviewed      Assessment/Plan:   43 y.o. female with L breast TNC Stage 1. [id number]. Initially 1.2cm on MMG, 7mm on U/S. No MRI. Diagnosed 1/2019. s/p neoadjuvant chemo (AC x 4,taxol x 12) completed 7/15/19  Genetics negative. VUS: APC, RAD51C.      - 6 week post chemo " will be August 26, 2019  - post margaret-adjuvant MMG 8/5/19  - Discussed the risk of immediate reconstruction with patient and that this very likely will not be feasible, given the timing and her comorbidities. Discussed possible lumpectomy as a potential surgical option with her and at this time, she is not interested in pursuing this option.   - Spoke with Dr. Chisholm's office to discuss referral to plastic surgery at [facility] vs [facility]  -Patient will have bilateral mastectomy w/ L. SLNB with tissue expanders placed by plastic surgery.

## 2019-09-12 NOTE — PLAN OF CARE
Patient assigned to this writer by charge nurse. The patient was  escorted to [facility] room 4 by PCT. The patient is currently  changing into a hospital gown. This writer is completing a chart review and reviewing MD orders.

## 2019-09-12 NOTE — HOSPITAL COURSE
The patient tolerated the procedure well, was transferred to recovery post-op, and then transferred to the floor for continuation of medical care. The patient's clinical condition progressively improved. By the time of discharge, she was tolerating a diet without nausea or vomiting, pain was well controlled with oral medications, and she was ambulating without difficulty. On POD 1 the patient was discharged to home. On discharge, the patient's incisions were c/d/i with an overlying incisional vac device The surgical site was soft and appropriately tender to palpation. DARREL drain output was stable; they remained at discharge. The patient will follow up in plastics clinic in 1 week.

## 2019-09-12 NOTE — HPI
43 y.o. female with triple negative T1 left breast cancer.  Is now 4 weeks after her last neoadjuvant chemotherapy treatment.  Here to discuss breast reconstruction options.    Saw Dr. Soriano last week.. Discussed lumpectomy and mastectomy options and based on her oncologist recs would prefer bilateral mastectomies.  During chemotherapy she became a insulin depended diabetic.  BMI 44.  History of vertical and low transverse abdominal incisions  Otherwise history of HLD, HTN.  Denies use of steroids

## 2019-09-12 NOTE — TRANSFER OF CARE
"Anesthesia Transfer of Care Note    Patient: Blessing Maddox    Procedure(s) Performed: Procedure(s) (LRB):  INSERTION, TISSUE EXPANDER, BREAST (Bilateral)  MASTECTOMY-skin sparing (Bilateral)  INJECTION, FOR SENTINEL NODE IDENTIFICATION (Left)  BIOPSY, LYMPH NODE, SENTINEL (Left)  LYMPHADENECTOMY, AXILLARY (Left)    Patient location: PACU    Anesthesia Type: general    Transport from OR: Transported from OR on room air with adequate spontaneous ventilation    Post pain: adequate analgesia    Post assessment: no apparent anesthetic complications and tolerated procedure well    Post vital signs: stable    Level of consciousness: awake, alert and oriented    Nausea/Vomiting: no nausea/vomiting    Complications: none    Transfer of care protocol was followed      Last vitals:   Visit Vitals  /73 (BP Location: Right arm, Patient Position: Lying)   Pulse 96   Temp 36.5 °C (97.7 °F) (Temporal)   Resp 18   Ht 5' 3" (1.6 m)   Wt 109.3 kg (241 lb)   LMP 06/04/2015 (Exact Date)   SpO2 (!) 93%   Breastfeeding? No   BMI 42.69 kg/m²     "

## 2019-09-12 NOTE — ANESTHESIA POSTPROCEDURE EVALUATION
Anesthesia Post Evaluation    Patient: Blessing Maddox    Procedure(s) Performed: Procedure(s) (LRB):  INSERTION, TISSUE EXPANDER, BREAST (Bilateral)  MASTECTOMY-skin sparing (Bilateral)  INJECTION, FOR SENTINEL NODE IDENTIFICATION (Left)  BIOPSY, LYMPH NODE, SENTINEL (Left)  LYMPHADENECTOMY, AXILLARY (Left)    Final Anesthesia Type: general  Patient location during evaluation: PACU  Patient participation: Yes- Able to Participate  Level of consciousness: awake and alert  Post-procedure vital signs: reviewed and stable  Pain management: adequate  Airway patency: patent  PONV status at discharge: No PONV  Anesthetic complications: no      Cardiovascular status: blood pressure returned to baseline  Respiratory status: unassisted  Hydration status: euvolemic  Follow-up not needed.          Vitals Value Taken Time   /80 9/12/2019  6:01 PM   Temp 36.6 °C (97.8 °F) 9/12/2019  6:00 PM   Pulse 106 9/12/2019  6:08 PM   Resp 18 9/12/2019  6:08 PM   SpO2 94 % 9/12/2019  6:08 PM   Vitals shown include unvalidated device data.      Event Time     Out of Recovery 16:30:00          Pain/Michael Score: Pain Rating Prior to Med Admin: 6 (9/12/2019  5:01 PM)  Pain Rating Post Med Admin: 3 (9/12/2019  5:15 PM)  Michael Score: 10 (9/12/2019  5:50 PM)

## 2019-09-12 NOTE — ANESTHESIA PROCEDURE NOTES
Erector Spinae Single Shot    Patient location during procedure: pre-op   Block not for primary anesthetic.  Reason for block: at surgeon's request and post-op pain management   Post-op Pain Location: Bilateral Chest  Start time: 9/12/2019 9:46 AM  Timeout: 9/12/2019 9:45 AM   End time: 9/12/2019 10:11 AM    Staffing  Authorizing Provider: Liliana Wing MD  Performing Provider: Aden Ewing MD    Preanesthetic Checklist  Completed: patient identified, site marked, surgical consent, pre-op evaluation, timeout performed, IV checked, risks and benefits discussed and monitors and equipment checked  Peripheral Block  Patient position: sitting  Prep: ChloraPrep  Patient monitoring: heart rate, cardiac monitor, continuous pulse ox, continuous capnometry and frequent blood pressure checks  Block type: erector spinae plane (Erector Spinae Plane Block)  Laterality: bilateral  Injection technique: single shot  Location: T2-3  Needle  Needle type: Stimuplex   Needle gauge: 21 G  Needle length: 4 in  Needle localization: anatomical landmarks and ultrasound guidance   -ultrasound image captured on disc.  Assessment  Injection assessment: negative aspiration, negative parasthesia and local visualized surrounding nerve  Paresthesia pain: none  Heart rate change: no  Slow fractionated injection: yes  Additional Notes  Patient tolerated well.  See [facility] RN record for vitals. Difficult anatomy for paravertebral by landmark and ultrasound, but attempt made on left. Paravertebral aborted in favor of U/S guided ESPs. 15cc per side

## 2019-09-13 VITALS
OXYGEN SATURATION: 93 % | HEART RATE: 86 BPM | BODY MASS INDEX: 42.7 KG/M2 | HEIGHT: 63 IN | TEMPERATURE: 98 F | RESPIRATION RATE: 16 BRPM | DIASTOLIC BLOOD PRESSURE: 74 MMHG | SYSTOLIC BLOOD PRESSURE: 122 MMHG | WEIGHT: 241 LBS

## 2019-09-13 LAB
ANION GAP SERPL CALC-SCNC: 8 MMOL/L (ref 8–16)
BASOPHILS # BLD AUTO: 0.02 K/UL (ref 0–0.2)
BASOPHILS NFR BLD: 0.3 % (ref 0–1.9)
BUN SERPL-MCNC: 8 MG/DL (ref 6–20)
CALCIUM SERPL-MCNC: 8.4 MG/DL (ref 8.7–10.5)
CHLORIDE SERPL-SCNC: 103 MMOL/L (ref 95–110)
CO2 SERPL-SCNC: 25 MMOL/L (ref 23–29)
CREAT SERPL-MCNC: 0.7 MG/DL (ref 0.5–1.4)
DIFFERENTIAL METHOD: ABNORMAL
EOSINOPHIL # BLD AUTO: 0.2 K/UL (ref 0–0.5)
EOSINOPHIL NFR BLD: 3 % (ref 0–8)
ERYTHROCYTE [DISTWIDTH] IN BLOOD BY AUTOMATED COUNT: 14.2 % (ref 11.5–14.5)
EST. GFR  (AFRICAN AMERICAN): >60 ML/MIN/1.73 M^2
EST. GFR  (NON AFRICAN AMERICAN): >60 ML/MIN/1.73 M^2
GLUCOSE SERPL-MCNC: 148 MG/DL (ref 70–110)
HCT VFR BLD AUTO: 35.2 % (ref 37–48.5)
HGB BLD-MCNC: 11.3 G/DL (ref 12–16)
IMM GRANULOCYTES # BLD AUTO: 0.03 K/UL (ref 0–0.04)
IMM GRANULOCYTES NFR BLD AUTO: 0.4 % (ref 0–0.5)
LYMPHOCYTES # BLD AUTO: 1.3 K/UL (ref 1–4.8)
LYMPHOCYTES NFR BLD: 18.2 % (ref 18–48)
MCH RBC QN AUTO: 27.1 PG (ref 27–31)
MCHC RBC AUTO-ENTMCNC: 32.1 G/DL (ref 32–36)
MCV RBC AUTO: 84 FL (ref 82–98)
MONOCYTES # BLD AUTO: 0.5 K/UL (ref 0.3–1)
MONOCYTES NFR BLD: 6.5 % (ref 4–15)
NEUTROPHILS # BLD AUTO: 5.3 K/UL (ref 1.8–7.7)
NEUTROPHILS NFR BLD: 71.6 % (ref 38–73)
NRBC BLD-RTO: 0 /100 WBC
PLATELET # BLD AUTO: 262 K/UL (ref 150–350)
PMV BLD AUTO: 10.1 FL (ref 9.2–12.9)
POCT GLUCOSE: 104 MG/DL (ref 70–110)
POCT GLUCOSE: 97 MG/DL (ref 70–110)
POTASSIUM SERPL-SCNC: 3.7 MMOL/L (ref 3.5–5.1)
RBC # BLD AUTO: 4.17 M/UL (ref 4–5.4)
SODIUM SERPL-SCNC: 136 MMOL/L (ref 136–145)
WBC # BLD AUTO: 7.35 K/UL (ref 3.9–12.7)

## 2019-09-13 PROCEDURE — 36415 COLL VENOUS BLD VENIPUNCTURE: CPT

## 2019-09-13 PROCEDURE — 85025 COMPLETE CBC W/AUTO DIFF WBC: CPT

## 2019-09-13 PROCEDURE — 94761 N-INVAS EAR/PLS OXIMETRY MLT: CPT

## 2019-09-13 PROCEDURE — 80048 BASIC METABOLIC PNL TOTAL CA: CPT

## 2019-09-13 PROCEDURE — 25000003 PHARM REV CODE 250: Performed by: STUDENT IN AN ORGANIZED HEALTH CARE EDUCATION/TRAINING PROGRAM

## 2019-09-13 RX ORDER — OXYCODONE AND ACETAMINOPHEN 5; 325 MG/1; MG/1
1 TABLET ORAL EVERY 4 HOURS PRN
Qty: 25 TABLET | Refills: 0 | Status: SHIPPED | OUTPATIENT
Start: 2019-09-13 | End: 2020-01-01

## 2019-09-13 RX ORDER — SULFAMETHOXAZOLE AND TRIMETHOPRIM 800; 160 MG/1; MG/1
1 TABLET ORAL 2 TIMES DAILY
Qty: 12 TABLET | Refills: 0 | Status: SHIPPED | OUTPATIENT
Start: 2019-09-13 | End: 2020-01-01 | Stop reason: CLARIF

## 2019-09-13 RX ADMIN — MUPIROCIN 1 G: 20 OINTMENT TOPICAL at 08:09

## 2019-09-13 RX ADMIN — ACETAMINOPHEN 650 MG: 325 TABLET ORAL at 12:09

## 2019-09-13 RX ADMIN — CYCLOBENZAPRINE HYDROCHLORIDE 10 MG: 10 TABLET, FILM COATED ORAL at 08:09

## 2019-09-13 RX ADMIN — GABAPENTIN 600 MG: 300 CAPSULE ORAL at 08:09

## 2019-09-13 RX ADMIN — IBUPROFEN 800 MG: 400 TABLET, FILM COATED ORAL at 08:09

## 2019-09-13 RX ADMIN — ACETAMINOPHEN 650 MG: 325 TABLET ORAL at 05:09

## 2019-09-13 RX ADMIN — OXYCODONE HYDROCHLORIDE 10 MG: 10 TABLET ORAL at 09:09

## 2019-09-13 RX ADMIN — DOCUSATE SODIUM 100 MG: 100 CAPSULE, LIQUID FILLED ORAL at 08:09

## 2019-09-13 RX ADMIN — METFORMIN HYDROCHLORIDE 1000 MG: 500 TABLET, FILM COATED ORAL at 08:09

## 2019-09-13 RX ADMIN — OXYCODONE HYDROCHLORIDE 10 MG: 10 TABLET ORAL at 01:09

## 2019-09-13 RX ADMIN — SULFAMETHOXAZOLE AND TRIMETHOPRIM 1 TABLET: 800; 160 TABLET ORAL at 08:09

## 2019-09-13 RX ADMIN — ATORVASTATIN CALCIUM 20 MG: 20 TABLET, FILM COATED ORAL at 08:09

## 2019-09-13 NOTE — PROGRESS NOTES
Pt arrive to floor via bed escorted by staff. VSS. Pt AAO x 4. Call light within reach. Bed in lowest position.

## 2019-09-13 NOTE — OP NOTE
DATE OF PROCEDURE:  09/12/2019    PREOPERATIVE DIAGNOSIS:  Breast cancer.    POSTOPERATIVE DIAGNOSIS:  Breast cancer.    PROCEDURES PERFORMED:  Immediate bilateral breast reconstruction using tissue   expanders, placement of bilateral Prevena wound VACs.    SURGEON:  Holden Abernathy M.D., ANDREW    ANESTHESIA:  General.    COMPLICATIONS:  None.    BLOOD LOSS:  Minimal.    DESCRIPTION OF PROCEDURE:  After completion of the mastectomy, I entered the   room.  The mastectomy flaps were checked.  They were noted to be very thick and   very well vascularized.  Hemostasis was meticulously checked.  The pockets were   irrigated with Betadine first followed by triple antibiotic irrigation.  Chest   wall was then reprepped and redraped.  Gloves were changed.  Two 650 mL smooth   Magna-Site implants were done on the back table.  They were placed in the   pockets and the tabs were secured using 2-0 Prolene.  Approximately 540 mL of   air were placed in each tissue expander.  Two drains were placed on each side.    The incisions were closed using interrupted 3-0 Monocryl followed by running 4-0   Monocryl subcuticular suture.  Bilateral Prevena wound VACs were placed on the   incisions.  There were no complications.      CRB/HN  dd: 09/12/2019 15:21:47 (CDT)  td: 09/12/2019 20:23:11 (CDT)  Doc ID   #[id number]  Job ID #[id number]    CC:

## 2019-09-13 NOTE — PLAN OF CARE
Problem: Diabetes Comorbidity  Goal: Blood Glucose Level Within Desired Range    Intervention: Maintain Glycemic Control  Pt verbalized understanding plan of care. Frequent assessments done and fall precautions maintained. Pain and discomfort controlled. Will continue to monitor.

## 2019-09-13 NOTE — PLAN OF CARE
Patient to be discharged home.  The patient will discharge with DARREL Drains in place and a incisional vac.  The patient does not have any home SW/CM needs.  Family to provide transportation home.    Future Appointments   Date Time Provider Department Center   9/18/2019  7:30 AM [facility] SONDRA RAYMOND [facility]   9/18/2019  8:30 AM JAZLYN Day IV, MD [facility] OPHTHAL ANTH[place]Y [place]   9/18/2019 11:40 AM [facility]   9/18/2019  3:15 PM Holden Abernathy MD [facility] PLASTIC [place] y   11/6/2019  9:35 AM [facility]   11/13/2019 10:40 AM Brittanie Douglas NP [facility] FAM MED ANTHONY [facility]   1/24/2020  8:30 AM SHERLYN, PMR RESIDENT [facility] PHYSMED ANTHONY [facility]   8/5/2020 12:00 PM CAROLEE Matute [facility] GASTRO ANTHONY 4TH [place]        09/13/19 1326   Final Note   Assessment Type Final Discharge Note   Anticipated Discharge Disposition Home   Hospital Follow Up  Appt(s) scheduled? No   Discharge plans and expectations educations in teach back method with documentation complete? Yes

## 2019-09-13 NOTE — DISCHARGE SUMMARY
[facility]  Plastic Surgery  Discharge Summary      Patient Name: Blessing Maddox  MRN: [id number]  Admission Date: 9/12/2019  Hospital Length of Stay: 1 days  Discharge Date and Time:  09/13/2019 01:05 PM  Attending Physician: Holden Abernathy, *   Discharging Provider: Wil Vasquez MD  Primary Care Provider: Brittanie Douglas NP    HPI:   43 y.o. female with triple negative T1 left breast cancer.  Is now 4 weeks after her last neoadjuvant chemotherapy treatment.  Here to discuss breast reconstruction options.    Saw Dr. Soriano last week.. Discussed lumpectomy and mastectomy options and based on her oncologist recs would prefer bilateral mastectomies.  During chemotherapy she became a insulin depended diabetic.  BMI 44.  History of vertical and low transverse abdominal incisions  Otherwise history of HLD, HTN.  Denies use of steroids    Procedure(s) (LRB):  INSERTION, TISSUE EXPANDER, BREAST (Bilateral)  MASTECTOMY-skin sparing (Bilateral)  INJECTION, FOR SENTINEL NODE IDENTIFICATION (Left)  BIOPSY, LYMPH NODE, SENTINEL (Left)  LYMPHADENECTOMY, AXILLARY (Left)      Indwelling Lines/Drains at time of discharge:   Lines/Drains/Airways     Central Venous Catheter Line                 Port A Cath Single Lumen 02/15/19 1112 right subclavian 209 days          Drain                 Closed/Suction Drain 09/12/19 1424 Right Breast Bulb 15 Fr. less than 1 day         Closed/Suction Drain 09/12/19 1424 Right;Lateral Breast Bulb 15 Fr. less than 1 day         Closed/Suction Drain 09/12/19 1522 Left Breast Bulb 15 Fr. less than 1 day         Closed/Suction Drain 09/12/19 1524 Left Breast Bulb 15 Fr. less than 1 day          Pressure Ulcer                 Negative Pressure Wound Therapy  less than 1 day              Hospital Course: The patient tolerated the procedure well, was transferred to recovery post-op, and then transferred to the floor for continuation of medical care. The patient's clinical  condition progressively improved. By the time of discharge, she was tolerating a diet without nausea or vomiting, pain was well controlled with oral medications, and she was ambulating without difficulty. On POD 1 the patient was discharged to home. On discharge, the patient's incisions were c/d/i with an overlying incisional vac device The surgical site was soft and appropriately tender to palpation. DARREL drain output was stable; they remained at discharge. The patient will follow up in plastics clinic in 1 week.      Progress Note:  Patient was seen at bedside prior to discharge. Was tolerating diet without N/V. Ambulating well. Good UOP. Pain well controlled. Discharge instructions given in person. No other concerns    Physical Exam   Constitutional: She is oriented to person, place, and time and well-developed, well-nourished, and in no distress.   Cardiovascular: Normal rate and intact distal pulses.   Pulmonary/Chest: Effort normal. No respiratory distress.   S/p bilateral mastectomy. Incisional vac in place. Tissue expanders inflated appropriately. Minimal bruising. No erythema. No fluctuance or sign of fluid collection. DARREL sites clean and dry.   Abdominal: Soft. She exhibits no distension. There is no tenderness.   Musculoskeletal: She exhibits no edema or tenderness.   Neurological: She is alert and oriented to person, place, and time.   Skin: Skin is warm and dry. No rash noted.   Nursing note and vitals reviewed.        Significant Diagnostic Studies:   Specimen (12h ago, onward)    None          Pending Diagnostic Studies:     None        Final Active Diagnoses:    Diagnosis Date Noted POA    PRINCIPAL PROBLEM:  Malignant neoplasm of left female breast [C50.912] 09/12/2019 Yes      Problems Resolved During this Admission:      Discharged Condition: good    Disposition: Home or Self Care    Follow Up:  Follow-up Information     Holden Abernathy MD In 1 week.    Specialty:  Plastic Surgery  Contact  "information:  [address]  [facility] [address]  [phone]             Mary Soriano MD In 2 weeks.    Specialties:  General Surgery, Breast Surgery  Contact information:  [address]  [facility] [address]  [phone]                 Patient Instructions:      Teach DARREL drain care and provide sheet to record output   Order Comments: Please record output per day and bring records to clinic.     Discharge instructions (Specify)   Order Comments: No driving while on narcotic medication.  Please do not wear any compression bras or devices.  If wound vac fails, please call [facility] and ask for "plastics fellow on call"     Other restrictions (specify):   Order Comments: Please do not get incisional vac device wet. May use damp rag or sponge to wet nearby area.     Lifting restrictions   Order Comments: Please do not lift greater than 10 pounds.     Call MD for:  extreme fatigue     Call MD for:  persistent dizziness or light-headedness     Call MD for:  difficulty breathing, headache or visual disturbances     Call MD for:  redness, tenderness, or signs of infection (pain, swelling, redness, odor or green/yellow discharge around incision site)     Call MD for:  severe uncontrolled pain     Call MD for:  persistent nausea and vomiting     Call MD for:  temperature >100.4     Activity as tolerated     Medications:  Reconciled Home Medications:      Medication List      START taking these medications    oxyCODONE-acetaminophen 5-325 mg per tablet  Commonly known as:  PERCOCET  Take 1 tablet by mouth every 4 (four) hours as needed for Pain.     sulfamethoxazole-trimethoprim 800-160mg 800-160 mg Tab  Commonly known as:  BACTRIM DS  Take 1 tablet by mouth 2 (two) times daily.        CONTINUE taking these medications    atorvastatin 20 MG tablet  Commonly known as:  LIPITOR  Take 1 tablet (20 mg total) by mouth once daily.     gabapentin 300 MG capsule  Commonly known as:  NEURONTIN  Take 2 capsules (600 mg " "total) by mouth 3 (three) times daily.     HumaLOG KwikPen Insulin 100 unit/mL pen  Generic drug:  insulin lispro  INJECT 50 UNITS INTO THE SKIN QPM     insulin glargine 100 units/mL (3mL) SubQ pen  Commonly known as:  LANTUS SOLOSTAR U-100 INSULIN  Inject 60 Units into the skin every evening.     insulin syringe-needle U-100 0.3 mL 31 gauge x 5/16" Syrg  1 each by Misc.(Non-Drug; Combo Route) route 3 (three) times daily with meals.     lidocaine 5 %  Commonly known as:  LIDODERM  Place 1 patch onto the skin daily as needed. Remove & Discard patch within 12 hours or as directed by MD as needed for pain.     liraglutide 0.6 mg/0.1 mL (18 mg/3 mL) subq PNIJ 0.6 mg/0.1 mL (18 mg/3 mL) Pnij  Commonly known as:  VICTOZA 2-BRITTANY  Inject 1.8 mg into the skin once daily.     lisinopril 10 MG tablet  Take 1 tablet (10 mg total) by mouth once daily.     metFORMIN 1000 MG tablet  Commonly known as:  GLUCOPHAGE  Take 1 tablet (1,000 mg total) by mouth 2 (two) times daily with meals.     ondansetron 8 MG Tbdl  Commonly known as:  ZOFRAN-ODT  Take 1 tablet (8 mg total) by mouth every 8 (eight) hours as needed.     ONETOUCH DELICA LANCETS 33 gauge Misc  Generic drug:  lancets  Inject 1 lancet into the skin 4 (four) times daily as needed.     ONETOUCH ULTRA BLUE TEST STRIP Strp  Generic drug:  blood sugar diagnostic  1 strip by Misc.(Non-Drug; Combo Route) route 4 (four) times daily as needed.     ONETOUCH ULTRA2 METER kit  Generic drug:  blood-glucose meter  U TO TEST BLOOD SUGAR D     pen needle, diabetic 33 gauge x 5/32" Ndle  1 each by Misc.(Non-Drug; Combo Route) route every evening.          Time spent on the discharge of patient: 30 minutes    Wil Vasquez MD  Plastic Surgery  [facility]  "

## 2019-09-13 NOTE — NURSING
Pt d/c home per MD order. Pt and family verbalized understanding d/c instructions.Medication delivered to pt bedside. IV removed and catheter tip intact.VSS. Pt left via wheelchair escorted by staff.

## 2019-09-13 NOTE — PLAN OF CARE
Cm met with patient and family to obtain discharge planning assessment.  Patient is POD 1 for bilateral mastectomy and insertion of expanders bilaterally.  Patient has an incisional vac in place.  Planned discharge is home with family - Plan (A) or home with family with home health - Plan (B).    PCP:  Brittanie Douglas NP     Payor:  Payor: MEDICAID / Plan: [facility] ([place] MEDICA[place]) / Product Type: Managed Medicaid /      Pharmacy:    [id number] - [place] - [address] AT [facility]  [address]  [place] [address]  Phone: [phone] Fax: [phone]       09/13/19 1258   Discharge Assessment   Assessment Type Discharge Planning Assessment   Confirmed/corrected address and phone number on facesheet? Yes   Assessment information obtained from? Patient   Expected Length of Stay (days) 1   Communicated expected length of stay with patient/caregiver yes   Prior to hospitilization cognitive status: Alert/Oriented   Prior to hospitalization functional status: Independent   Current cognitive status: Alert/Oriented   Current Functional Status: Independent   Lives With spouse;child(claudia), adult   Able to Return to Prior Arrangements yes   Is patient able to care for self after discharge? Yes   Patient's perception of discharge disposition home or selfcare   Readmission Within the Last 30 Days no previous admission in last 30 days   Patient currently being followed by outpatient case management? No   Patient currently receives any other outside agency services? No   Equipment Currently Used at Home none   Do you have any problems affording any of your prescribed medications? No   Is the patient taking medications as prescribed? yes   Does the patient have transportation home? Yes   Transportation Anticipated family or friend will provide   Does the patient receive services at the Coumadin Clinic? No   Discharge Plan A Home with family   Discharge Plan B Home  with family;Home Health   DME Needed Upon Discharge  none   Patient/Family in Agreement with Plan yes

## 2019-09-13 NOTE — OP NOTE
Operative Note     9/12/2019    PRE-OP DIAGNOSIS: Malignant neoplasm of lower-outer quadrant of left breast of female, estrogen receptor negative [C50.512, Z17.1]      POST-OP DIAGNOSIS: Post-Op Diagnosis Codes:     * Malignant neoplasm of lower-outer quadrant of left breast of female, estrogen receptor negative [C50.512, Z17.1]    PROCEDURES:    Procedure(s):  INSERTION, TISSUE EXPANDER, BREAST - the reconstruction portion of the case will be dictated by Dr. Abernathy separately  MASTECTOMY-skin sparing bilateral  INJECTION, FOR SENTINEL NODE IDENTIFICATION left  BIOPSY, LYMPH NODE, SENTINEL left  LYMPHADENECTOMY, AXILLARY  left    SURGEON: Surgeon(s) and Role:  Panel 1:     * Holden Abernathy MD - Primary  Panel 2:     * Mary Soriano MD - Primary  Resident:  [place]    ANESTHESIA: General     OPERATIVE FINDINGS:  Mastectomy flaps are were healthy-appearing at the completion of the case.  The 3rd sentinel lymph node was positive on frozen section and therefore we proceeded to axillary lymph node dissection    INDICATION FOR PROCEDURE: This patient presents with a history of triple negative breast cancer of the left breast    PROCEDURE IN DETAIL:  Blessing Maddox is a 44 y.o. female brought to the operating room for definitive surgery of triple negative breast cancer of the left breast.  The patient has elected to undergo bilateral simple mastectomy with sentinel lymph node biopsy for rylan assessment. The patient was informed of the possible risks and complications of the procedure, including but not limited to anesthetic risks, bleeding, infection, and need for additional surgery.  The patient concurred with the proposed plan, and has given informed consent.  The site of surgery was properly noted/marked in the preoperative holding area.     The patient was then brought to the operating room and placed in the supine position with both upper extremities extended.  regional and general anesthesia was  administered. Perioperative antibiotics were administered consisting of Ancef and a time out was performed confirming the patient, site, and procedure.  The patient's left breast was injected with technetium to facilitate sentinel lymph node identification. The bilateral chest and axilla was then prepped and draped in the usual sterile fashion.    We first turned our attention to the right prophylactic breast where an inferior Hill incision was extended from the inframammary fold to incorporate the nipple areolar complex.  The incision was made with a 10-blade and deepened through the subcutaneous tissues with Bovie electrocautery.  Skin flaps were raised to the clavicle superiorly, to the lateral border of the sternum medially, to the inframammary fold inferiorly, and to the anterior border of the latissimus dorsi muscle laterally. The breast tissue was sharply excised off the chest wall taking care to incorporate the pectoralis fascia while leaving the serratus fascia behind.  The resulting mastectomy specimen was marked using a short stitch superiorly and a long stitch laterally.  The breast was sent to pathology for permanent evaluation.   The operative field was irrigated with normal saline and all bleeding points were secured with Bovie electrocautery.    Reconstruction and closure will be performed by the plastic surgery service.      We then turned our attention to the left breast where an inferior hill incision was fashioned to incorporate the nipple areolar complex.  The incision was made with a 10-blade and extended through the subcutaneous tissues with Bovie electrocautery.  Skin flaps were raised to the clavicle superiorly.  We then  turned our attention to the left axilla.  Blue dye was injected prior to the incision in the usual subareolar fashion. The gamma probe was used to identify an area of increased radioactivity within the lower axilla. The clavipectoral sheath was sharply incised to reveal the  level I axillary lymph nodes. The probe was used to identify a single node with increased radioactivity.  This node was brought into the operative field and carefully dissected free of the surrounding lymphovascular structures.  The highest ex vivo count of the node was 312.  The node was then sent to pathology for frozen section evaluation, labeled as sentinel node #1.  A total of 3 axillary sentinel nodes and 0 axillary non-sentinel nodes were identified, excised and submitted to pathology.  Bed counts were obtained to confirm that the 10% rule had not been violated.   The wound was irrigated with normal saline, and all bleeding points were secured with Bovie electrocautery.     We then proceeded to raise the remainder of the flaps to the lateral border of the sternum medially, to the inframammary fold inferiorly, and to the anterior border of the latissimus dorsi muscle laterally. The breast tissue was sharply excised off the chest wall taking care to incorporate the pectoralis fascia while leaving the serratus fascia behind.  The resulting mastectomy specimen was marked using a short stitch superiorly and long stitch laterally.  The breast was sent to pathology for permanent evaluation.      Frozen section rylan evaluation revealed evidence of metastatic disease in the 3rd lymph node that was sent which was both hot and blue noted to be low in the axilla.  We therefore then proceeded with a completion axillary dissection on the left    An axillary dissection was performed with removal of the associated lymph nodes and surrounding adipose tissue. This included levels I and II. This was accomplished by exposing the axillary vein superiorly. Small venous tributaries, lymphatics, and vessels were clipped and ligated or cauterized and divided. The subscapularis muscle was skeletonized. The long thoracic and thoracodorsal neurovascular bundles were identified and preserved. The tissue between the long thoracic and  thoracodorsal bundle was removed.  Of note, at the end of the dissection, level 3 nodes were palpated and no abnormal nodes were noted.      The specimen was submitted to pathology. The wound was irrigated. Plastic surgery completed the reconstruction drain placement and closure of the case.  The drains were secured with sutures. Dermabond was applied along with a dry bulky gauze dressing.    Instrument, sponge, and needle counts were correct at closure and at the conclusion of the case.       Dermabond was applied. A post surgical bra was placed on the patient. At the end of the operation, all sponge, instrument, and needle counts x 2 were correct.    ESTIMATED BLOOD LOSS: less than 50 mL    COMPLICATIONS:  None    DISPOSITION: PACU - hemodynamically stable.    ATTESTATION:   I was present and scrubbed for the entire procedure.

## 2019-09-13 NOTE — BRIEF OP NOTE
[facility]  Brief Operative Note    SUMMARY     Surgery Date: 9/12/2019     Surgeon(s) and Role:  Panel 1:     * Holden Abernathy MD - Primary  Panel 2:     * Mary Soriano MD - Primary    Assisting Surgeon: None    Pre-op Diagnosis:  Malignant neoplasm of lower-outer quadrant of left breast of female, estrogen receptor negative [C50.512, Z17.1]    Post-op Diagnosis:  Post-Op Diagnosis Codes:     * Malignant neoplasm of lower-outer quadrant of left breast of female, estrogen receptor negative [C50.512, Z17.1]    Procedure(s) (LRB):  INSERTION, TISSUE EXPANDER, BREAST (Bilateral)  MASTECTOMY-skin sparing (Bilateral)  INJECTION, FOR SENTINEL NODE IDENTIFICATION (Left)  BIOPSY, LYMPH NODE, SENTINEL (Left)  LYMPHADENECTOMY, AXILLARY (Left)    Anesthesia: General    Description of Procedure: Bilateral mastectomy with left axillary dissection for 1 positive lymph node    Description of the findings of the procedure: as above    Estimated Blood Loss: 100 mL         Specimens:   Specimen (12h ago, onward)    Start     Ordered    09/12/19 1444  Specimen to Pathology - Surgery  Once     Comments:  Pre-op Diagnosis: Malignant neoplasm of lower-outer quadrant of left breast of female, estrogen receptor negative [C50.512, Z17.1]Post-op Diagnosis: Malignant neoplasm of lower-outer quadrant of left breast of female, estrogen receptor negative [C50.512, Z17.1]Procedure(s):INSERTION, TISSUE EXPANDER, BREASTMASTECTOMY-skin sparingINJECTION, FOR SENTINEL NODE IDENTIFICATIONBIOPSY, LYMPH NODE, SENTINELLYMPHADENECTOMY, AXILLARY Number of specimens: 6Name of specimens: 1.) Right Breast short stitch superior, long stitch lateral- Permanent.2.) Left Axillary [place] Lymph node hot 121 - Frozen.( out to patho with Domenica at 1307)3.) Left Axillary [place] Lymph node hot 312 and Blue- Frozen.( out to patho with Domenica at 1307)4.) Left Axillary [place] Lymph node hot 148 and Blue - Frozen ( out to patho with  Royer at 1327)5.) Left Breast Short stitch superior and Long stitch lateral - Permanent.6.) Left Axillary Contents - Permanent.     Start Status     09/12/19 1444 Collected (09/12/19 1449) Order ID: [id number]       09/12/19 1445

## 2019-09-16 ENCOUNTER — PATIENT OUTREACH (OUTPATIENT)
Dept: ADMINISTRATIVE | Facility: CLINIC | Age: 44
End: 2019-09-16

## 2019-09-16 NOTE — PATIENT INSTRUCTIONS
Mastectomy  Mastectomy is surgery to remove the breast. The most common mastectomies are called simple (or total) and modified radical. During these procedures, the chest muscle under the breast is not removed. As a result, arm strength remains. Keeping the chest muscle also makes reconstruction easier.     Simple (total) mastectomy.       Modified radical mastectomy.      Simple (total) mastectomy  During a simple mastectomy, the breast tissue (lobules, ducts, and fatty tissue) and the nipple are removed. This surgery most often needs a hospital stay. Based on the results of surgery and follow-up tests, more treatment may be needed.  Modified radical mastectomy  This type of mastectomy is usually done to treat invasive cancer that has spread to the lymph nodes. During the mastectomy the breast tissue and a strip of skin with the nipple is removed. Some of the axillary lymph nodes are also removed. (These are lymph nodes in the arm pit.) The removed nodes are tested for cancer. Sometimes a surgical drain is used to keep fluid from building up. This drain usually stays in for 1 to 2 weeks after surgery. Modified radical mastectomy almost always needs a hospital stay. Based on the results of the surgery and follow-up tests, more treatment may also be needed.  Right after surgery  You will wake up in the recovery room. You may have an IV (intravenous) line for fluids and medicines. You will have a tight dressing wrapped around your chest and there may be a drain coming out of it. Pain medicines will be given to you as needed. A nurse will check your temperature, pulse, and blood pressure. You'll likely stay in the hospital for at least a day.  You will be given instructions on how to care for the dressing and drains, what kind of pain medicines you should use, and how to take care of yourself as you recover. Make sure you understand all the instructions and know when you need to next see the healthcare  provider.  Risks and complications of mastectomy  These include:  · Pain or numbness  · Bleeding or infection  · Stiffness of the shoulder  · Fluid collection (seroma)  · Long-term swelling of the arm (lymphedema)  · Wound-healing problems  Talk to your healthcare provider about the risks related to your surgery and what you can do to help prevent problems.   When to call your healthcare provider  Call your healthcare provider right away if you have any of the following after surgery:  · Fever  · Chills  · A change in the way the drainage looks or increased drainage  · Increased pain, warmth, swelling, or redness at the incision(s)  · Cough or shortness of breath  · Pain in the chest or calf  · Bleeding that soaks the dressing  · Any other problems your healthcare providers told you to watch for and report  Make sure you know how to reach your healthcare provider in case problems come up. Know how to get help after office hours, on weekends, and on holidays, too.     © 1063-8390 [employer]. [address] [place] [address], [place] [address]. All rights reserved. This information is not intended as a substitute for professional medical care. Always follow your healthcare professional's instructions.        Discharge Instructions for Mastectomy or Breast Lumpectomy  You are being treated for breast cancer or precancer. The cancer or precancerous tissue was removed with surgery. This may have been done with a lumpectomy. Or it may have been with a total mastectomy. A lumpectomy means that the tumor and a bit of tissue around it were removed. Lymph nodes in your armpit may also have been removed. A mastectomy means that all of the breast tissue and maybe nearby lymph nodes have been removed.  Activity  · Be sure you understand what you can and cannot do as you recover from surgery:  · Ask for help with chores and errands while you recover.  · Do not lift anything heavy until your healthcare provider says it's  OK.  · Do not vacuum or do active or strenuous housework until your healthcare provider says it's OK.  · Do the range-of-motion exercises that you learned in the hospital.   Home care  Here are suggestions for taking care of yourself at home:  · Take pain medicine as directed.  · Keep your incisions clean and dry.  · Check your incisions daily for signs of infection. These include redness, swelling, and drainage. They also include the edges of an incision opening up.  · Follow your healthcare provider's instructions about bathing or showering.  · If your healthcare provider says it's OK,wash your incisions gently. Use mild soap and warm water. Pat dry.  · Don't soak in a tub, hot tub, or pool until your healthcare provider says it's OK.   · Take your temperature each day for 7 days after the surgery.  · Eat normal meals as soon as you feel able. Stick to a healthy, well-balanced diet.  Follow-up  Make a follow-up appointment as directed by your healthcare provider. If you had a mastectomy, you may have choices for reconstructive breast surgery or a prosthesis.Ask to talk to someone who can tell you more about your choices.  When to call your healthcare provider  Call your healthcare provider right away if you have any of the following:  · Fever of 100.4°F (38°C) or higher  · Chills  · Drainage from your incisions  · Swelling around your incisions  · Increasing pain in or around your incisions  · Swelling in your arm or hand on the surgery side  Know what problems to watch for and when you need to call your healthcare providers. Also be sure you know how to get help after office hours and on weekends and holidays, too.      © 0764-5064 [employer]. [address] [place] [address], [place] [address]. All rights reserved. This information is not intended as a substitute for professional medical care. Always follow your healthcare professional's instructions.

## 2019-09-17 NOTE — PLAN OF CARE
9/17/19    Peer to Peer requested for Dr. Wil Vasquez for inpatient denial.  Will wait for decision.  Ref for denial is [id number].

## 2019-09-18 ENCOUNTER — OFFICE VISIT (OUTPATIENT)
Dept: PLASTIC SURGERY | Facility: CLINIC | Age: 44
End: 2019-09-18
Payer: MEDICAID

## 2019-09-18 VITALS
HEIGHT: 63 IN | BODY MASS INDEX: 41.87 KG/M2 | DIASTOLIC BLOOD PRESSURE: 58 MMHG | WEIGHT: 236.31 LBS | SYSTOLIC BLOOD PRESSURE: 126 MMHG | HEART RATE: 110 BPM

## 2019-09-18 DIAGNOSIS — Z09 SURGERY FOLLOW-UP EXAMINATION: Primary | ICD-10-CM

## 2019-09-18 PROCEDURE — 99213 OFFICE O/P EST LOW 20 MIN: CPT | Mod: PBBFAC | Performed by: SURGERY

## 2019-09-18 PROCEDURE — 99999 PR PBB SHADOW E&M-EST. PATIENT-LVL III: ICD-10-PCS | Mod: PBBFAC,,, | Performed by: SURGERY

## 2019-09-18 PROCEDURE — 99024 PR POST-OP FOLLOW-UP VISIT: ICD-10-PCS | Mod: ,,, | Performed by: SURGERY

## 2019-09-18 PROCEDURE — 99024 POSTOP FOLLOW-UP VISIT: CPT | Mod: ,,, | Performed by: SURGERY

## 2019-09-18 PROCEDURE — 99999 PR PBB SHADOW E&M-EST. PATIENT-LVL III: CPT | Mod: PBBFAC,,, | Performed by: SURGERY

## 2019-09-18 NOTE — PROGRESS NOTES
History & Physical  Plastic Surgery Clinic    SUBJECTIVE:     Chief complaint: f/u combined bilateral mastectomy with insertion of tissue expanders    History of Present Illness:  Patient is a 44 y.o. female presents for follow-up of insertion of tissue expanders following bilateral mastectomy with Dr. Soriano. She is doing well post-operatively. Pain well controlled with medication. Ambulating well. Tolerating diet without N/V. Drains with thin SS output >40 cc per drain. No other concerns.    Past Medical History:  Past Medical History:   Diagnosis Date    Breast cancer     Diabetes mellitus     DUB (dysfunctional uterine bleeding)     Endometrial hyperplasia     Fatty liver     HLD (hyperlipidemia)     Hypertension     Low back pain     TIA (transient ischemic attack)     Uterine cancer        Past Surgical History:  Past Surgical History:   Procedure Laterality Date    BILATERAL SALPINGOOPHORECTOMY  2015    Pelvic lymph node dissection    BIOPSY, LYMPH NODE, SENTINEL Left 2019    [facility]    BREAST BIOPSY Left 2019    us guided     SECTION      x5    CHOLECYSTECTOMY  1998    COLONOSCOPY N/A 2017    Performed by [facility] Amos [facility] at [facility]    HYSTERECTOMY  6-4-15    hysteroscopy, D&C  4-23-15    INJECTION, FOR SENTINEL NODE IDENTIFICATION Left 2019    [facility]    INSERTION, TISSUE EXPANDER, BREAST Bilateral 2019    [facility] at [facility]    BSEWOANDC-DDZF-K-CATH Right 2/15/2019    Performed by Grupo Sheriff [facility] at [facility] OR    LYMPHADENECTOMY, AXILLARY Left 2019    [facility]    LYMPHADENECTOMY-PERIAORTIC N/A 9/15/2015    [facility] at [facility]    DSXSW-JCCRERPV-FRTBQIORWMIT N/A 9/15/2015    [facility] at [facility]    MASTECTOMY-skin  sparing Bilateral 9/12/2019    [facility] at [facility]    ROBOT ASSISTED LAPAROSCOPIC SALPINGO-OOPHERECTOMY Bilateral 9/15/2015    [facility] at [facility] OR [facility]       Family History:  Family History   Problem Relation Age of Onset    COPD Mother     Hypertension Mother     Stroke Mother         x3    Sleep apnea Mother     Heart disease Maternal Grandmother     Hypertension Maternal Grandmother     Ovarian cancer Maternal Aunt     Uterine cancer Maternal Aunt     Colon cancer Neg Hx     Esophageal cancer Neg Hx     Rectal cancer Neg Hx     Stomach cancer Neg Hx        Social History:  Social History     Socioeconomic History    Marital status:      Spouse name: Not on file    Number of children: Not on file    Years of education: Not on file    Highest education level: Not on file   Occupational History    Not on file   Social Needs    Financial resource strain: Not on file    Food insecurity:     Worry: Not on file     Inability: Not on file    Transportation needs:     Medical: Not on file     Non-medical: Not on file   Tobacco Use    Smoking status: Never Smoker    Smokeless tobacco: Never Used   Substance and Sexual Activity    Alcohol use: No     Alcohol/week: 0.0 oz    Drug use: No    Sexual activity: Not on file   Lifestyle    Physical activity:     Days per week: Not on file     Minutes per session: Not on file    Stress: Not on file   Relationships    Social connections:     Talks on phone: Not on file     Gets together: Not on file     Attends [employer] service: Not on file     Active member of club or organization: Not on file     Attends meetings of clubs or organizations: Not on file     Relationship status: Not on file   Other Topics Concern    Not on file   Social History Narrative    Not on file       Medications:  Current Outpatient Medications   Medication Sig Dispense Refill    atorvastatin (LIPITOR) 20 MG tablet  "Take 1 tablet (20 mg total) by mouth once daily. 90 tablet 3    gabapentin (NEURONTIN) 300 MG capsule Take 2 capsules (600 mg total) by mouth 3 (three) times daily. 180 capsule 3    HUMALOG KWIKPEN INSULIN 100 unit/mL pen INJECT 50 UNITS INTO THE SKIN QPM  5    insulin (LANTUS SOLOSTAR U-100 INSULIN) glargine 100 units/mL (3mL) SubQ pen Inject 60 Units into the skin every evening. 18 mL 11    insulin syringe-needle U-100 0.3 mL 31 gauge x 5/16" Syrg 1 each by Misc.(Non-Drug; Combo Route) route 3 (three) times daily with meals. 100 each 5    lidocaine (LIDODERM) 5 % Place 1 patch onto the skin daily as needed. Remove & Discard patch within 12 hours or as directed by MD as needed for pain. 30 patch 5    liraglutide 0.6 mg/0.1 mL, 18 mg/3 mL, subq PNIJ (VICTOZA 2-BRITTANY) 0.6 mg/0.1 mL (18 mg/3 mL) PnIj Inject 1.8 mg into the skin once daily. 9 mL 11    lisinopril 10 MG tablet Take 1 tablet (10 mg total) by mouth once daily. 90 tablet 3    metFORMIN (GLUCOPHAGE) 1000 MG tablet Take 1 tablet (1,000 mg total) by mouth 2 (two) times daily with meals. 180 tablet 3    ondansetron (ZOFRAN-ODT) 8 MG TbDL Take 1 tablet (8 mg total) by mouth every 8 (eight) hours as needed. 90 tablet 3    ONETOUCH DELICA LANCETS 33 gauge Misc Inject 1 lancet into the skin 4 (four) times daily as needed. 200 each 5    ONETOUCH ULTRA BLUE TEST STRIP Strp 1 strip by Misc.(Non-Drug; Combo Route) route 4 (four) times daily as needed. 200 strip 5    ONETOUCH ULTRA2 kit U TO TEST BLOOD SUGAR D  2    oxyCODONE-acetaminophen (PERCOCET) 5-325 mg per tablet Take 1 tablet by mouth every 4 (four) hours as needed for Pain. 25 tablet 0    pen needle, diabetic 33 gauge x 5/32" Ndle 1 each by Misc.(Non-Drug; Combo Route) route every evening. 100 each 5    sulfamethoxazole-trimethoprim 800-160mg (BACTRIM DS) 800-160 mg Tab Take 1 tablet by mouth 2 (two) times daily. 12 tablet 0     No current facility-administered medications for this visit.  " "    Facility-Administered Medications Ordered in Other Visits   Medication Dose Route Frequency Provider Last Rate Last Dose    diphenhydrAMINE (BENADRYL) 50 mg in sodium chloride 0.9% 50 mL IVPB  50 mg Intravenous 1 time in Clinic/HOD Melvin Chisholm MD        heparin, porcine (PF) 100 unit/mL injection flush 500 Units  500 Units Intravenous PRN Melvin Chisholm MD        heparin, porcine (PF) 100 unit/mL injection flush 500 Units  500 Units Intravenous PRN Melvin Chisholm MD   500 Units at 06/24/19 1017    lactated ringers infusion   Intravenous Continuous Nathalie Vickers MD        lidocaine (PF) 10 mg/ml (1%) injection 10 mg  1 mL Intradermal Once Nathalie Vickers MD        ondansetron disintegrating tablet 8 mg  8 mg Oral Once PRN Nathalie Vickers MD        PACLitaxel (TAXOL) 80 mg/m2 = 180 mg in sodium chloride 0.9% 280 mL chemo infusion  80 mg/m2 (Treatment Plan Recorded) Intravenous 1 time in Clinic/HOD Melvin Chisholm MD        palonosetron (ALOXI) 0.25 mg, dexamethasone (DECADRON) 10 mg in sodium chloride 0.9% 50 mL IVPB   Intravenous 1 time in Clinic/HOD Melvin Chisholm MD           Allergies:  Review of patient's allergies indicates:   Allergen Reactions    Ciprofloxacin Other (See Comments)     Made pt lose eye sight for seven days    Flagyl [metronidazole] Swelling     THROAT       Review of Systems:  Negative except for HPI.      OBJECTIVE:     BP (!) 126/58   Pulse 110   Ht 5' 3" (1.6 m)   Wt 107.2 kg (236 lb 5.3 oz)   LMP 06/04/2015 (Exact Date)   BMI 41.86 kg/m²     Physical Exam   Constitutional: She is oriented to person, place, and time and well-developed, well-nourished, and in no distress.   Cardiovascular: Normal rate and intact distal pulses.   Pulmonary/Chest: Effort normal. No respiratory distress.   S/p bilateral mastectomy. Tissue expanders with air, inflated appropriately. Incision c/d/i, appropriately tender. DARREL drain sites clean and dry. Skin of " breast without erythema. No sign of fluid collection or fluctuance.   Abdominal: Soft. She exhibits no distension. There is no tenderness.   Musculoskeletal: She exhibits no edema or tenderness.   Neurological: She is alert and oriented to person, place, and time.   Skin: Skin is warm and dry. No rash noted.   Nursing note and vitals reviewed.            ASSESSMENT/PLAN:     44 y.o. female with follow-up 1 week from bilateral mastectomy with insertion of tissue expanders.    - Doing well  - Incisional vac removed, dermabond placed along incision  - Drains (x4) with >40 cc thin SS output per day, all remain in place  - Follow up clinic in 1 week

## 2019-09-20 ENCOUNTER — TELEPHONE (OUTPATIENT)
Dept: SURGERY | Facility: CLINIC | Age: 44
End: 2019-09-20

## 2019-09-20 NOTE — ADDENDUM NOTE
Addendum  created 09/20/19 1130 by Liliana Wing MD    Attestation recorded in Intraprocedure, Intraprocedure Attestations filed

## 2019-09-20 NOTE — TELEPHONE ENCOUNTER
----- Message from Jimmy Hernandez sent at 9/20/2019  4:37 PM CDT -----  Contact: Pt  Type:  Needs Medical Advice    Who Called: The Pt was scheduled for a P/O with Dr. Abernathy and should have also been scheduled for a P/O for Dr. Soriano as well.  Please contact the Pt to confirm this.  If so, please schedule the Pt for 9/25/19 if the appt will be needed like the discharge summary said.    Best Call Back Number: [phone]

## 2019-09-20 NOTE — TELEPHONE ENCOUNTER
Return call to pt. Pt already has appt with Dr Abernathy at 3:15 pm on 9/25/19. Informed pt that Dr Soriano can see her same day at 4:00 pm.

## 2019-09-25 ENCOUNTER — OFFICE VISIT (OUTPATIENT)
Dept: PLASTIC SURGERY | Facility: CLINIC | Age: 44
End: 2019-09-25
Payer: MEDICAID

## 2019-09-25 ENCOUNTER — OFFICE VISIT (OUTPATIENT)
Dept: SURGERY | Facility: CLINIC | Age: 44
End: 2019-09-25
Payer: MEDICAID

## 2019-09-25 VITALS
SYSTOLIC BLOOD PRESSURE: 135 MMHG | HEART RATE: 101 BPM | BODY MASS INDEX: 41.29 KG/M2 | HEIGHT: 63 IN | WEIGHT: 233 LBS | DIASTOLIC BLOOD PRESSURE: 78 MMHG | WEIGHT: 233 LBS | HEIGHT: 63 IN | BODY MASS INDEX: 41.29 KG/M2 | DIASTOLIC BLOOD PRESSURE: 78 MMHG | HEART RATE: 101 BPM | SYSTOLIC BLOOD PRESSURE: 135 MMHG

## 2019-09-25 DIAGNOSIS — C50.512 MALIGNANT NEOPLASM OF LOWER-OUTER QUADRANT OF LEFT BREAST OF FEMALE, ESTROGEN RECEPTOR NEGATIVE: Primary | ICD-10-CM

## 2019-09-25 DIAGNOSIS — Z17.1 MALIGNANT NEOPLASM OF LOWER-OUTER QUADRANT OF LEFT BREAST OF FEMALE, ESTROGEN RECEPTOR NEGATIVE: Primary | ICD-10-CM

## 2019-09-25 DIAGNOSIS — Z09 SURGERY FOLLOW-UP EXAMINATION: Primary | ICD-10-CM

## 2019-09-25 PROCEDURE — 99024 PR POST-OP FOLLOW-UP VISIT: ICD-10-PCS | Mod: ,,, | Performed by: SURGERY

## 2019-09-25 PROCEDURE — 99999 PR PBB SHADOW E&M-EST. PATIENT-LVL III: ICD-10-PCS | Mod: PBBFAC,,, | Performed by: SURGERY

## 2019-09-25 PROCEDURE — 99024 POSTOP FOLLOW-UP VISIT: CPT | Mod: ,,, | Performed by: SURGERY

## 2019-09-25 PROCEDURE — 99213 OFFICE O/P EST LOW 20 MIN: CPT | Mod: PBBFAC | Performed by: SURGERY

## 2019-09-25 PROCEDURE — 99999 PR PBB SHADOW E&M-EST. PATIENT-LVL III: CPT | Mod: PBBFAC,,, | Performed by: SURGERY

## 2019-09-25 PROCEDURE — 99213 OFFICE O/P EST LOW 20 MIN: CPT | Mod: PBBFAC,27 | Performed by: SURGERY

## 2019-09-25 NOTE — PROGRESS NOTES
"REFERRING PHYSICIAN:  Brittanie Douglas NP    MEDICAL ONCOLOGIST:    pending  RADIATION ONCOLOGIST:   pending    DIAGNOSIS:    This is a 44 y.o. female with a stage pT1b pN1 grade 2  ER - OR - HER2 - invasive mammary carcinoma of the left breast.    TREATMENT SUMMARY:  The patient is status post bilateral skin sparing mastectomy and left sentinel node biopsy on 9/12/19.  Final pathology showed 8 mm Invasive mammary of the left breast. Neg margins. No DCIS. Positive lymph nodes: 2 with micromets and 1 with isolated tumor    INTERVAL HISTORY:   Blessing Maddox comes in for a post-op check.  She denies fever, chills, chest pain or shortness of breath.  Her pain is well controlled.      MEDICATIONS:  Current Outpatient Medications   Medication Sig Dispense Refill    atorvastatin (LIPITOR) 20 MG tablet Take 1 tablet (20 mg total) by mouth once daily. 90 tablet 3    gabapentin (NEURONTIN) 300 MG capsule Take 2 capsules (600 mg total) by mouth 3 (three) times daily. 180 capsule 3    HUMALOG KWIKPEN INSULIN 100 unit/mL pen INJECT 50 UNITS INTO THE SKIN QPM  5    insulin (LANTUS SOLOSTAR U-100 INSULIN) glargine 100 units/mL (3mL) SubQ pen Inject 60 Units into the skin every evening. 18 mL 11    insulin syringe-needle U-100 0.3 mL 31 gauge x 5/16" Syrg 1 each by Misc.(Non-Drug; Combo Route) route 3 (three) times daily with meals. 100 each 5    lidocaine (LIDODERM) 5 % Place 1 patch onto the skin daily as needed. Remove & Discard patch within 12 hours or as directed by MD as needed for pain. 30 patch 5    liraglutide 0.6 mg/0.1 mL, 18 mg/3 mL, subq PNIJ (VICTOZA 2-BRITTANY) 0.6 mg/0.1 mL (18 mg/3 mL) PnIj Inject 1.8 mg into the skin once daily. 9 mL 11    lisinopril 10 MG tablet Take 1 tablet (10 mg total) by mouth once daily. 90 tablet 3    metFORMIN (GLUCOPHAGE) 1000 MG tablet Take 1 tablet (1,000 mg total) by mouth 2 (two) times daily with meals. 180 tablet 3    ondansetron (ZOFRAN-ODT) 8 MG TbDL Take 1 tablet (8 mg " "total) by mouth every 8 (eight) hours as needed. 90 tablet 3    ONETOUCH DELICA LANCETS 33 gauge Misc Inject 1 lancet into the skin 4 (four) times daily as needed. 200 each 5    ONETOUCH ULTRA BLUE TEST STRIP Strp 1 strip by Misc.(Non-Drug; Combo Route) route 4 (four) times daily as needed. 200 strip 5    ONETOUCH ULTRA2 kit U TO TEST BLOOD SUGAR D  2    oxyCODONE-acetaminophen (PERCOCET) 5-325 mg per tablet Take 1 tablet by mouth every 4 (four) hours as needed for Pain. 25 tablet 0    pen needle, diabetic 33 gauge x 5/32" Ndle 1 each by Misc.(Non-Drug; Combo Route) route every evening. 100 each 5    sulfamethoxazole-trimethoprim 800-160mg (BACTRIM DS) 800-160 mg Tab Take 1 tablet by mouth 2 (two) times daily. 12 tablet 0     No current facility-administered medications for this visit.      Facility-Administered Medications Ordered in Other Visits   Medication Dose Route Frequency Provider Last Rate Last Dose    diphenhydrAMINE (BENADRYL) 50 mg in sodium chloride 0.9% 50 mL IVPB  50 mg Intravenous 1 time in Clinic/HOD Melvin Chisholm MD        heparin, porcine (PF) 100 unit/mL injection flush 500 Units  500 Units Intravenous PRN Melvin Chisholm MD        heparin, porcine (PF) 100 unit/mL injection flush 500 Units  500 Units Intravenous PRN Melvin Chisholm MD   500 Units at 06/24/19 1017    lactated ringers infusion   Intravenous Continuous Nathalie Vickers MD        lidocaine (PF) 10 mg/ml (1%) injection 10 mg  1 mL Intradermal Once Nathalie Vickers MD        ondansetron disintegrating tablet 8 mg  8 mg Oral Once PRN Nathalie Vickers MD        PACLitaxel (TAXOL) 80 mg/m2 = 180 mg in sodium chloride 0.9% 280 mL chemo infusion  80 mg/m2 (Treatment Plan Recorded) Intravenous 1 time in Clinic/HOD Melvin Chisholm MD        palonosetron (ALOXI) 0.25 mg, dexamethasone (DECADRON) 10 mg in sodium chloride 0.9% 50 mL IVPB   Intravenous 1 time in Clinic/HOD Melvin Chisholm MD     "       ALLERGIES:   Review of patient's allergies indicates:   Allergen Reactions    Ciprofloxacin Other (See Comments)     Made pt lose eye sight for seven days    Flagyl [metronidazole] Swelling     THROAT       PHYSICAL EXAMINATION:   General:  This is a well appearing female with appropriate speech, affect and gait.     Breast:  Incisions clean, dry, and intact    PATHOLOGY:  FINAL PATHOLOGIC DIAGNOSIS  1. BREAST (RIGHT MASTECTOMY): NO EVIDENCE OF MALIGNANCY IN SECTIONS OF BREAST, SKIN, AND  NIPPLE; SINGLE BENIGN LYMPH NODE.    2. LYMPH NODE (LEFT SENTINEL LYMPH NODE, CLINICAL): NO EVIDENCE OF MALIGNANCY.    3. LYMPH NODE (LEFT SENTINEL LYMPH NODE, CLINICAL): NO EVIDENCE OF MALIGNANCY.    4. LYMPH NODE (LEFT SENTINEL LYMPH NODE, CLINICAL): METASTATIC CARCINOMA (12 mm) WITH  EXTRANODAL EXTENSION.    5. BREAST (LEFT MASTECTOMY): INVASIVE MAMMARY CARCINOMA (8 mm, LOQ); SECTIONS OF BREAST  MARGINS, SKIN, AND NIPPLE FREE OF MALIGNANCY.  Note: Previous hormone receptor assay results from a needle biopsy ([id number]) were reported by Dr. YOBANI Cadena  as ER negative, PgR negative, Her2 negative. The hormone studies along with an e-cadherin stain will be repeated on the resected tumor and will be reported in a supplement.    6. LYMPH NODES (LEFT AXILLA, CLINICAL): METASTATIC CARCINOMA TO TWO (2) OF TWENTY-FIVE (25)  LYMPH NODES; ISOLATED TUMOR CELLS IDENTIFIED IN ONE ADDITIONAL LYMPH NODE.    Surgical Pathology Cancer Case Summary  INVASIVE CARCINOMA OF BREAST, RESECTION (September 2019 protocol)  Procedure: total mastectomy  Laterality: left  Tumor size: 8 mm  Histologic type: invasive mammary carcinoma  Histologic grade:  Glandular activity-3  Nuclear pleomorphism-2  Mitotic rate-1  Overall grade-2  Ductal carcinoma in situ: not identified  Size of DCIS: na  Tumor extension: not identified  Margins:  Invasive margins-not involved, 3.8 cm from inferior margin  DCIS margins-na  Regional lymph nodes:  Nodes with  macrometastases (>2mm): 0  Nodes with micrometastases (>0.2mm-2mm): 2  Nodes with isolated tumor cells (</=0.2mm): 1  Stage: ypT1b pN1    IMPRESSION:   The patient has had an uneventful postoperative course.    PLAN:   1. return in 6 months for a follow up office visit and breast exam  2. The patient is advised in continued exam of the breast chest wall and to report to this office sooner should she note any areas of abnormality or concern.   3. She has been instructed to meet with med onc and rad onc for discussion of adjuvant treatment recommendations.  We will present her case at tumor board

## 2019-09-25 NOTE — PROGRESS NOTES
"Two weeks s/p BL skin sparing mastectomy and TE placement    Drain output was too high last week  All drains now 20-33 cc daily  Does notice some "phantom" nipple sensations    L: healing well noticed some axillary fat, upper drain removed  R: skin closed, small amount of exudate on bunched skin medially, upper drain removed  Both skin flaps healthy    Expanded with additional 120cc air bilaterally  Two drains removed  Healing well  Dr. Soriano to see her today  Follow up next week    Cholo May DO      [facility] Plastic Surgery Fellow     Cell: [phone]    "

## 2019-10-02 ENCOUNTER — OFFICE VISIT (OUTPATIENT)
Dept: PLASTIC SURGERY | Facility: CLINIC | Age: 44
End: 2019-10-02
Payer: MEDICAID

## 2019-10-02 VITALS
HEART RATE: 92 BPM | DIASTOLIC BLOOD PRESSURE: 66 MMHG | HEIGHT: 63 IN | WEIGHT: 236.88 LBS | BODY MASS INDEX: 41.97 KG/M2 | SYSTOLIC BLOOD PRESSURE: 116 MMHG

## 2019-10-02 DIAGNOSIS — Z09 SURGERY FOLLOW-UP EXAMINATION: Primary | ICD-10-CM

## 2019-10-02 PROCEDURE — 99024 POSTOP FOLLOW-UP VISIT: CPT | Mod: ,,, | Performed by: PHYSICIAN ASSISTANT

## 2019-10-02 PROCEDURE — 99999 PR PBB SHADOW E&M-EST. PATIENT-LVL IV: ICD-10-PCS | Mod: PBBFAC,,, | Performed by: PHYSICIAN ASSISTANT

## 2019-10-02 PROCEDURE — 99214 OFFICE O/P EST MOD 30 MIN: CPT | Mod: PBBFAC | Performed by: PHYSICIAN ASSISTANT

## 2019-10-02 PROCEDURE — 99024 PR POST-OP FOLLOW-UP VISIT: ICD-10-PCS | Mod: ,,, | Performed by: PHYSICIAN ASSISTANT

## 2019-10-02 PROCEDURE — 99999 PR PBB SHADOW E&M-EST. PATIENT-LVL IV: CPT | Mod: PBBFAC,,, | Performed by: PHYSICIAN ASSISTANT

## 2019-10-02 NOTE — PROGRESS NOTES
"Subjective:      Blessing Maddox is a 44 y.o. year old female who [facility] on 10[facility]02/2019 for follow up visit status post Bilateral immediate breast reconstruction with tissue expander placement on 09/12/2019. Drains of B breast with <20cc/24 hours. Denies fever, chills, nausea, vomiting, or other systemic signs of infection.    Vitals:    10/02/19 0836   BP: 116/66   Pulse: 92        Review of patient's allergies indicates:   Allergen Reactions    Ciprofloxacin Other (See Comments)     Made pt lose eye sight for seven days    Flagyl [metronidazole] Swelling     THROAT       Current Outpatient Medications on File Prior to Visit   Medication Sig Dispense Refill    atorvastatin (LIPITOR) 20 MG tablet Take 1 tablet (20 mg total) by mouth once daily. 90 tablet 3    gabapentin (NEURONTIN) 300 MG capsule Take 2 capsules (600 mg total) by mouth 3 (three) times daily. 180 capsule 3    HUMALOG KWIKPEN INSULIN 100 unit/mL pen INJECT 50 UNITS INTO THE SKIN QPM  5    insulin (LANTUS SOLOSTAR U-100 INSULIN) glargine 100 units/mL (3mL) SubQ pen Inject 60 Units into the skin every evening. 18 mL 11    insulin syringe-needle U-100 0.3 mL 31 gauge x 5/16" Syrg 1 each by Misc.(Non-Drug; Combo Route) route 3 (three) times daily with meals. 100 each 5    lidocaine (LIDODERM) 5 % Place 1 patch onto the skin daily as needed. Remove & Discard patch within 12 hours or as directed by MD as needed for pain. 30 patch 5    liraglutide 0.6 mg/0.1 mL, 18 mg/3 mL, subq PNIJ (VICTOZA 2-BRITTANY) 0.6 mg/0.1 mL (18 mg/3 mL) PnIj Inject 1.8 mg into the skin once daily. 9 mL 11    lisinopril 10 MG tablet Take 1 tablet (10 mg total) by mouth once daily. 90 tablet 3    metFORMIN (GLUCOPHAGE) 1000 MG tablet Take 1 tablet (1,000 mg total) by mouth 2 (two) times daily with meals. 180 tablet 3    ondansetron (ZOFRAN-ODT) 8 MG TbDL Take 1 tablet (8 mg total) by mouth every 8 (eight) hours as needed. 90 tablet 3    " "ONETOUCH DELICA LANCETS 33 gauge [facility] [place] 1 lancet into the skin 4 (four) times daily as needed. 200 each 5    ONETOUCH ULTRA BLUE TEST STRIP Strp 1 strip by Misc.(Non-Drug; Combo Route) route 4 (four) times daily as needed. 200 strip 5    ONETOUCH ULTRA2 kit U TO TEST BLOOD SUGAR D  2    oxyCODONE-acetaminophen (PERCOCET) 5-325 mg per tablet Take 1 tablet by mouth every 4 (four) hours as needed for Pain. 25 tablet 0    pen needle, diabetic 33 gauge x 5/32" Ndle 1 each by Misc.(Non-Drug; Combo Route) route every evening. 100 each 5    sulfamethoxazole-trimethoprim 800-160mg (BACTRIM DS) 800-160 mg Tab Take 1 tablet by mouth 2 (two) times daily. 12 tablet 0     Current Facility-Administered Medications on File Prior to Visit   Medication Dose Route Frequency Provider Last Rate Last Dose    diphenhydrAMINE (BENADRYL) 50 mg in sodium chloride 0.9% 50 mL IVPB  50 mg Intravenous 1 time in Clinic/HOD Melvin Chisholm MD        heparin, porcine (PF) 100 unit/mL injection flush 500 Units  500 Units Intravenous PRN Melvin Chisholm MD        heparin, porcine (PF) 100 unit/mL injection flush 500 Units  500 Units Intravenous PRN Melvin Chisholm MD   500 Units at 06/24/19 1017    lactated ringers infusion   Intravenous Continuous Nathalie Vickers MD        lidocaine (PF) 10 mg/ml (1%) injection 10 mg  1 mL Intradermal Once Nathalie Vickers MD        ondansetron disintegrating tablet 8 mg  8 mg Oral Once PRN Nathalie Vickers MD        PACLitaxel (TAXOL) 80 mg/m2 = 180 mg in sodium chloride 0.9% 280 mL chemo infusion  80 mg/m2 (Treatment Plan Recorded) Intravenous 1 time in Clinic/HOD Melvin Chisholm MD        palonosetron (ALOXI) 0.25 mg, dexamethasone (DECADRON) 10 mg in sodium chloride 0.9% 50 mL IVPB   Intravenous 1 time in Clinic/HOD Melvin Chisholm MD           Patient Active Problem List   Diagnosis    Endometrial cancer    S/P BSO (bilateral salpingo-oophorectomy)    Vasomotor " symptoms due to menopause    Depression    COOPER (stress urinary incontinence, female)    Insomnia    Diabetes mellitus    Elevated LFTs    Fatty liver    Abnormal CT scan, gastrointestinal tract    Abnormal CT scan    Endometrial hyperplasia    Malignant neoplasm of lower-outer quadrant of left female breast    Triple negative malignant neoplasm of breast    Malignant neoplasm of lower-outer quadrant of left breast of female, estrogen receptor negative    Port-A-Cath in place    Hypertension    Musculoskeletal pain of right upper extremity    Hypomagnesemia    Neuropathy    Malignant neoplasm of left female breast       [unfilled]    Social History     Socioeconomic History    Marital status:      Spouse name: Not on file    Number of children: Not on file    Years of education: Not on file    Highest education level: Not on file   Occupational History    Not on file   Social Needs    Financial resource strain: Not on file    Food insecurity:     Worry: Not on file     Inability: Not on file    Transportation needs:     Medical: Not on file     Non-medical: Not on file   Tobacco Use    Smoking status: Never Smoker    Smokeless tobacco: Never Used   Substance and Sexual Activity    Alcohol use: No     Alcohol/week: 0.0 standard drinks    Drug use: No    Sexual activity: Not on file   Lifestyle    Physical activity:     Days per week: Not on file     Minutes per session: Not on file    Stress: Not on file   Relationships    Social connections:     Talks on phone: Not on file     Gets together: Not on file     Attends Orthodox service: Not on file     Active member of club or organization: Not on file     Attends meetings of clubs or organizations: Not on file     Relationship status: Not on file   Other Topics Concern    Not on file   Social History Narrative    Not on file     DATE OF PROCEDURE:  09/12/2019     PREOPERATIVE DIAGNOSIS:  Breast cancer.     POSTOPERATIVE DIAGNOSIS:   Breast cancer.     PROCEDURES PERFORMED:  Immediate bilateral breast reconstruction using tissue   expanders, placement of bilateral Prevena wound VACs.     SURGEON:  Holden Abernathy M.D., FACS     ANESTHESIA:  General.     COMPLICATIONS:  None.     BLOOD LOSS:  Minimal.      Review of Systems: s/p breast reconstruction, no acute issues    Objective:     Physical Exam:  Vitals:    10/02/19 0836   BP: 116/66   Pulse: 92       WD WN NAD  VSS  Normal resp effort  R breast - incision CDI, no erythema or drainage, surgically absent nipple, drain to bulb suction (serous output)  L breast - incision CDI, no erythema or drainage, surgically absent nipple, drain to bulb suction (serous output)        Assessment:       1. Surgery follow-up examination        Plan:   44 y.o. female status post B TE breast recon  - Doing well, no issues  - 180ccNS air added to B TE prior to drain removal. Drains with <20cc/24 hours  - Signs of fluid accumulation discussed  - Will need XRT, consult with Rad Onc next week. Discussed need for air removal, replace with saline prior to XRT mapping.   - RTC x 1 week, appt scheduled    The patient was advised to call the clinic with any questions or concerns prior to their next visit.

## 2019-10-07 ENCOUNTER — OFFICE VISIT (OUTPATIENT)
Dept: PLASTIC SURGERY | Facility: CLINIC | Age: 44
End: 2019-10-07
Payer: MEDICAID

## 2019-10-07 VITALS
WEIGHT: 234.25 LBS | SYSTOLIC BLOOD PRESSURE: 137 MMHG | HEART RATE: 97 BPM | DIASTOLIC BLOOD PRESSURE: 87 MMHG | BODY MASS INDEX: 41.49 KG/M2

## 2019-10-07 DIAGNOSIS — Z09 SURGERY FOLLOW-UP EXAMINATION: ICD-10-CM

## 2019-10-07 DIAGNOSIS — Z85.3 HX OF BREAST CANCER: Primary | ICD-10-CM

## 2019-10-07 PROCEDURE — 99024 PR POST-OP FOLLOW-UP VISIT: ICD-10-PCS | Mod: ,,, | Performed by: PHYSICIAN ASSISTANT

## 2019-10-07 PROCEDURE — 99214 OFFICE O/P EST MOD 30 MIN: CPT | Mod: PBBFAC | Performed by: PHYSICIAN ASSISTANT

## 2019-10-07 PROCEDURE — 99999 PR PBB SHADOW E&M-EST. PATIENT-LVL IV: CPT | Mod: PBBFAC,,, | Performed by: PHYSICIAN ASSISTANT

## 2019-10-07 PROCEDURE — 99024 POSTOP FOLLOW-UP VISIT: CPT | Mod: ,,, | Performed by: PHYSICIAN ASSISTANT

## 2019-10-07 PROCEDURE — 99999 PR PBB SHADOW E&M-EST. PATIENT-LVL IV: ICD-10-PCS | Mod: PBBFAC,,, | Performed by: PHYSICIAN ASSISTANT

## 2019-10-07 NOTE — PROGRESS NOTES
"Subjective:      Blessing Maddox is a 44 y.o. year old female who presents to the [facility] on 10/07/2019 for follow up visit status post Bilateral immediate breast reconstruction with tissue expander placement on 09/12/2019.  Bilateral breast drains were removed at last week's visit and Air was added to bilateral tissue expanders.  Patient called the clinic this morning with concerns of fluid accumulation of left breast with no associated erythema  Denies fever, chills, nausea, vomiting, or other systemic signs of infection.    Vitals:    10/07/19 1142   BP: 137/87   Pulse: 97        Review of patient's allergies indicates:   Allergen Reactions    Ciprofloxacin Other (See Comments)     Made pt lose eye sight for seven days    Flagyl [metronidazole] Swelling     THROAT       Current Outpatient Medications on File Prior to Visit   Medication Sig Dispense Refill    atorvastatin (LIPITOR) 20 MG tablet Take 1 tablet (20 mg total) by mouth once daily. 90 tablet 3    gabapentin (NEURONTIN) 300 MG capsule Take 2 capsules (600 mg total) by mouth 3 (three) times daily. 180 capsule 3    HUMALOG KWIKPEN INSULIN 100 unit/mL pen INJECT 50 UNITS INTO THE SKIN QPM  5    insulin (LANTUS SOLOSTAR U-100 INSULIN) glargine 100 units/mL (3mL) SubQ pen Inject 60 Units into the skin every evening. 18 mL 11    insulin syringe-needle U-100 0.3 mL 31 gauge x 5/16" Syrg 1 each by Misc.(Non-Drug; Combo Route) route 3 (three) times daily with meals. 100 each 5    lidocaine (LIDODERM) 5 % Place 1 patch onto the skin daily as needed. Remove & Discard patch within 12 hours or as directed by MD as needed for pain. 30 patch 5    liraglutide 0.6 mg/0.1 mL, 18 mg/3 mL, subq PNIJ (VICTOZA 2-BRITTANY) 0.6 mg/0.1 mL (18 mg/3 mL) PnIj Inject 1.8 mg into the skin once daily. 9 mL 11    lisinopril 10 MG tablet Take 1 tablet (10 mg total) by mouth once daily. 90 tablet 3    metFORMIN (GLUCOPHAGE) 1000 MG tablet Take 1 tablet (1,000 mg " "total) by mouth 2 (two) times daily with meals. 180 tablet 3    ondansetron (ZOFRAN-ODT) 8 MG TbDL Take 1 tablet (8 mg total) by mouth every 8 (eight) hours as needed. 90 tablet 3    ONETOUCH DELICA LANCETS 33 gauge Misc Inject 1 lancet into the skin 4 (four) times daily as needed. 200 each 5    ONETOUCH ULTRA BLUE TEST STRIP Strp 1 strip by Misc.(Non-Drug; Combo Route) route 4 (four) times daily as needed. 200 strip 5    ONETOUCH ULTRA2 kit U TO TEST BLOOD SUGAR D  2    oxyCODONE-acetaminophen (PERCOCET) 5-325 mg per tablet Take 1 tablet by mouth every 4 (four) hours as needed for Pain. 25 tablet 0    pen needle, diabetic 33 gauge x 5/32" Ndle 1 each by Misc.(Non-Drug; Combo Route) route every evening. 100 each 5    sulfamethoxazole-trimethoprim 800-160mg (BACTRIM DS) 800-160 mg Tab Take 1 tablet by mouth 2 (two) times daily. 12 tablet 0     Current Facility-Administered Medications on File Prior to Visit   Medication Dose Route Frequency Provider Last Rate Last Dose    diphenhydrAMINE (BENADRYL) 50 mg in sodium chloride 0.9% 50 mL IVPB  50 mg Intravenous 1 time in Clinic/HOD Melvin Chisholm MD        heparin, porcine (PF) 100 unit/mL injection flush 500 Units  500 Units Intravenous PRN Melvin Chisholm MD        heparin, porcine (PF) 100 unit/mL injection flush 500 Units  500 Units Intravenous PRN Melvin Chisholm MD   500 Units at 06/24/19 1017    lactated ringers infusion   Intravenous Continuous Nathalie Vickers MD        lidocaine (PF) 10 mg/ml (1%) injection 10 mg  1 mL Intradermal Once Nathalie Vickers MD        ondansetron disintegrating tablet 8 mg  8 mg Oral Once PRN Nathalie Vickers MD        PACLitaxel (TAXOL) 80 mg/m2 = 180 mg in sodium chloride 0.9% 280 mL chemo infusion  80 mg/m2 (Treatment Plan Recorded) Intravenous 1 time in Clinic/HOD Melvin Chisholm MD        palonosetron (ALOXI) 0.25 mg, dexamethasone (DECADRON) 10 mg in sodium chloride 0.9% 50 mL IVPB   " Intravenous 1 time in Clinic/HOD Melvin Chisholm MD           Patient Active Problem List   Diagnosis    Endometrial cancer    S/P BSO (bilateral salpingo-oophorectomy)    Vasomotor symptoms due to menopause    Depression    COOPER (stress urinary incontinence, female)    Insomnia    Diabetes mellitus    Elevated LFTs    Fatty liver    Abnormal CT scan, gastrointestinal tract    Abnormal CT scan    Endometrial hyperplasia    Malignant neoplasm of lower-outer quadrant of left female breast    Triple negative malignant neoplasm of breast    Malignant neoplasm of lower-outer quadrant of left breast of female, estrogen receptor negative    Port-A-Cath in place    Hypertension    Musculoskeletal pain of right upper extremity    Hypomagnesemia    Neuropathy    Malignant neoplasm of left female breast       [unfilled]    Social History     Socioeconomic History    Marital status:      Spouse name: Not on file    Number of children: Not on file    Years of education: Not on file    Highest education level: Not on file   Occupational History    Not on file   Social Needs    Financial resource strain: Not on file    Food insecurity:     Worry: Not on file     Inability: Not on file    Transportation needs:     Medical: Not on file     Non-medical: Not on file   Tobacco Use    Smoking status: Never Smoker    Smokeless tobacco: Never Used   Substance and Sexual Activity    Alcohol use: No     Alcohol/week: 0.0 standard drinks    Drug use: No    Sexual activity: Not on file   Lifestyle    Physical activity:     Days per week: Not on file     Minutes per session: Not on file    Stress: Not on file   Relationships    Social connections:     Talks on phone: Not on file     Gets together: Not on file     Attends Jewish service: Not on file     Active member of club or organization: Not on file     Attends meetings of clubs or organizations: Not on file     Relationship status: Not on file    Other Topics Concern    Not on file   Social History Narrative    Not on file     DATE OF PROCEDURE:  09/12/2019     PREOPERATIVE DIAGNOSIS:  Breast cancer.     POSTOPERATIVE DIAGNOSIS:  Breast cancer.     PROCEDURES PERFORMED:  Immediate bilateral breast reconstruction using tissue   expanders, placement of bilateral Prevena wound VACs.     SURGEON:  Holden Abernathy M.D., FACS     ANESTHESIA:  General.     COMPLICATIONS:  None.     BLOOD LOSS:  Minimal.      Review of Systems: s/p breast reconstruction, no acute issues    Objective:     Physical Exam:  Vitals:    10/07/19 1142   BP: 137/87   Pulse: 97       WD WN NAD  VSS  Normal resp effort  R breast - incision CDI, no erythema or drainage, surgically absent nipple  L breast - incision CDI, no erythema or drainage, surgically absent nipple, small fluid accumulation        Assessment:       1. Hx of breast cancer    2. Surgery follow-up examination        Plan:   44 y.o. female status post B TE breast recon  - Doing well, no issues  - Small fluid accumulation in hanging skin of L medial breast. Attempted aspiration but unable to obtain fluid.   - 150ccNS air added to R TE, 120cc Air added to L TE. Tolerated well. - Discussed need for air removal, replace with saline prior to XRT mapping.   - RTC x 1 week, appt scheduled    The patient was advised to call the clinic with any questions or concerns prior to their next visit.

## 2019-10-08 ENCOUNTER — TUMOR BOARD CONFERENCE (OUTPATIENT)
Dept: SURGERY | Facility: CLINIC | Age: 44
End: 2019-10-08

## 2019-10-08 NOTE — PROGRESS NOTES
Triple negative malignant neoplasm of breast    1/17/2019 Imaging Significant Findings     Mammogram  Impression:  Left  Mass: Left breast 12 mm mass at the lower outer position. Assessment: 4 - Suspicious finding. Biopsy is recommended.        Recommendation:  Biopsy is recommended.          1/23/2019 Biopsy     Left breast mass, biopsy:  -Invasive ductal carcinoma  -Gilliam histologic grade: Grade 3      1/23/2019 Initial Diagnosis     Triple negative malignant neoplasm of breast      1/23/2019 Breast Tumor Markers     Estrogen Receptor: Negative  Progesterone Receptor: Negative  HER2: Negative  Ki67: >30%      4/29/2019 - 7/15/2019 Chemotherapy     Treatment Summary   Plan Name: OP BREAST PACLITAXEL (WKLY)  Treatment Goal: Curative  Status: Active  Start Date: 4/29/2019  End Date: 7/15/2019  Provider: Melvin Chisholm MD  Chemotherapy: PACLitaxel (TAXOL) 80 mg/m2 = 180 mg in sodium chloride 0.9% 250 mL chemo infusion, 80 mg/m2 = 180 mg, Intravenous, Clinic/HOD 1 time, 4 of 4 cycles  Administration: 180 mg (4/29/2019), 180 mg (5/6/2019), 180 mg (5/13/2019), 180 mg (5/20/2019), 180 mg (5/27/2019), 180 mg (6/3/2019), 180 mg (6/10/2019), 180 mg (6/24/2019), 180 mg (7/1/2019), 180 mg (7/8/2019), 180 mg (7/15/2019), 180 mg (6/17/2019)        9/12/2019 Breast Surgery     Surgery: Dr Mary Soriano, [phone] performed bilateral mastectomy with sentinel lymph node biopsy  and ALND with pathology showing grade 3 invasive ductal carcinoma, 8 mm with 2 positive lymph nodes.         9/12/2019 Cancer Staged     yp T1b N1      10/8/2019 Tumor Conference       Young with extranodal disease and extra rylan extension. XRT . Standard 6-8 cycles of Capecitibine, usually perform XRT first. Could consider Penabro trial, although tough treatment course.

## 2019-10-14 PROBLEM — A04.72 C. DIFFICILE DIARRHEA: Status: ACTIVE | Noted: 2019-10-14

## 2019-10-15 ENCOUNTER — OFFICE VISIT (OUTPATIENT)
Dept: PLASTIC SURGERY | Facility: CLINIC | Age: 44
End: 2019-10-15
Payer: MEDICAID

## 2019-10-15 ENCOUNTER — TELEPHONE (OUTPATIENT)
Dept: SURGERY | Facility: CLINIC | Age: 44
End: 2019-10-15

## 2019-10-15 VITALS
WEIGHT: 237.19 LBS | HEIGHT: 63 IN | BODY MASS INDEX: 42.03 KG/M2 | HEART RATE: 108 BPM | SYSTOLIC BLOOD PRESSURE: 135 MMHG | DIASTOLIC BLOOD PRESSURE: 78 MMHG

## 2019-10-15 DIAGNOSIS — Z09 SURGERY FOLLOW-UP EXAMINATION: Primary | ICD-10-CM

## 2019-10-15 PROCEDURE — 99999 PR PBB SHADOW E&M-EST. PATIENT-LVL V: ICD-10-PCS | Mod: PBBFAC,,, | Performed by: PHYSICIAN ASSISTANT

## 2019-10-15 PROCEDURE — 99024 PR POST-OP FOLLOW-UP VISIT: ICD-10-PCS | Mod: ,,, | Performed by: PHYSICIAN ASSISTANT

## 2019-10-15 PROCEDURE — 99999 PR PBB SHADOW E&M-EST. PATIENT-LVL V: CPT | Mod: PBBFAC,,, | Performed by: PHYSICIAN ASSISTANT

## 2019-10-15 PROCEDURE — 99215 OFFICE O/P EST HI 40 MIN: CPT | Mod: PBBFAC | Performed by: PHYSICIAN ASSISTANT

## 2019-10-15 PROCEDURE — 99024 POSTOP FOLLOW-UP VISIT: CPT | Mod: ,,, | Performed by: PHYSICIAN ASSISTANT

## 2019-10-15 NOTE — TELEPHONE ENCOUNTER
Return call to pt. Wanted to know when she is to start radiation. Informed pt that records to be fax[place] today to Melissa Macario [place] [facility] [place]. Pt also stated that she will need to have her tissue expander filled with saline prior to starting radiation.

## 2019-10-15 NOTE — PROGRESS NOTES
"Subjective:      Blessing Maddox is a 44 y.o. year old female who [facility] on 10/15/2019 for follow up visit status post Bilateral immediate breast reconstruction with tissue expander placement on 09/12/2019. Patient with positive C Diff culture, placed on PO Vancomycin yesterday.  Denies fever, chills, nausea, vomiting, or other systemic signs of infection.    There were no vitals filed for this visit.     Review of patient's allergies indicates:   Allergen Reactions    Ciprofloxacin Other (See Comments)     Made pt lose eye sight for seven days    Flagyl [metronidazole] Swelling     THROAT       Current Outpatient Medications on File Prior to Visit   Medication Sig Dispense Refill    atorvastatin (LIPITOR) 20 MG tablet Take 1 tablet (20 mg total) by mouth once daily. 90 tablet 3    gabapentin (NEURONTIN) 300 MG capsule Take 2 capsules (600 mg total) by mouth 3 (three) times daily. 180 capsule 3    HUMALOG KWIKPEN INSULIN 100 unit/mL pen INJECT 50 UNITS INTO THE SKIN QPM  5    insulin (LANTUS SOLOSTAR U-100 INSULIN) glargine 100 units/mL (3mL) SubQ pen Inject 60 Units into the skin every evening. 18 mL 11    insulin syringe-needle U-100 0.3 mL 31 gauge x 5/16" Syrg 1 each by Misc.(Non-Drug; Combo Route) route 3 (three) times daily with meals. 100 each 5    lidocaine (LIDODERM) 5 % Place 1 patch onto the skin daily as needed. Remove & Discard patch within 12 hours or as directed by MD as needed for pain. 30 patch 5    liraglutide 0.6 mg/0.1 mL, 18 mg/3 mL, subq PNIJ (VICTOZA 2-BRITTANY) 0.6 mg/0.1 mL (18 mg/3 mL) PnIj Inject 1.8 mg into the skin once daily. 9 mL 11    lisinopril 10 MG tablet Take 1 tablet (10 mg total) by mouth once daily. 90 tablet 3    metFORMIN (GLUCOPHAGE) 1000 MG tablet Take 1 tablet (1,000 mg total) by mouth 2 (two) times daily with meals. 180 tablet 3    ondansetron (ZOFRAN-ODT) 8 MG TbDL Take 1 tablet (8 mg total) by mouth every 8 (eight) hours as " "needed. 90 tablet 3    ONETOUCH DELICA LANCETS 33 gauge Misc Inject 1 lancet into the skin 4 (four) times daily as needed. 200 each 5    ONETOUCH ULTRA BLUE TEST STRIP Strp 1 strip by Misc.(Non-Drug; Combo Route) route 4 (four) times daily as needed. 200 strip 5    ONETOUCH ULTRA2 kit U TO TEST BLOOD SUGAR D  2    oxyCODONE-acetaminophen (PERCOCET) 5-325 mg per tablet Take 1 tablet by mouth every 4 (four) hours as needed for Pain. (Patient not taking: Reported on 10/10/2019) 25 tablet 0    pen needle, diabetic 33 gauge x 5/32" Ndle 1 each by Misc.(Non-Drug; Combo Route) route every evening. 100 each 5    sulfamethoxazole-trimethoprim 800-160mg (BACTRIM DS) 800-160 mg Tab Take 1 tablet by mouth 2 (two) times daily. (Patient not taking: Reported on 10/10/2019) 12 tablet 0    vancomycin (VANCOCIN) 125 MG capsule Take 1 capsule (125 mg total) by mouth every 6 (six) hours. for 10 days 40 capsule 0     Current Facility-Administered Medications on File Prior to Visit   Medication Dose Route Frequency Provider Last Rate Last Dose    diphenhydrAMINE (BENADRYL) 50 mg in sodium chloride 0.9% 50 mL IVPB  50 mg Intravenous 1 time in Clinic/HOD Melvin Chisholm MD        heparin, porcine (PF) 100 unit/mL injection flush 500 Units  500 Units Intravenous PRN Melvin Chisholm MD        heparin, porcine (PF) 100 unit/mL injection flush 500 Units  500 Units Intravenous PRN Melvin Chisholm MD   500 Units at 06/24/19 1017    lactated ringers infusion   Intravenous Continuous Nathalie Vickers MD        lidocaine (PF) 10 mg/ml (1%) injection 10 mg  1 mL Intradermal Once Nathalie Vickers MD        ondansetron disintegrating tablet 8 mg  8 mg Oral Once PRN Nathalie Vickers MD        PACLitaxel (TAXOL) 80 mg/m2 = 180 mg in sodium chloride 0.9% 280 mL chemo infusion  80 mg/m2 (Treatment Plan Recorded) Intravenous 1 time in Clinic/HOD Melvin Chisholm MD        palonosetron (ALOXI) 0.25 mg, dexamethasone " (DECADRON) 10 mg in sodium chloride 0.9% 50 mL IVPB   Intravenous 1 time in Clinic/HOD Melvin Chisholm MD           Patient Active Problem List   Diagnosis    Endometrial cancer    S/P BSO (bilateral salpingo-oophorectomy)    Vasomotor symptoms due to menopause    Depression    COOPER (stress urinary incontinence, female)    Insomnia    Diabetes mellitus    Elevated LFTs    Fatty liver    Abnormal CT scan, gastrointestinal tract    Abnormal CT scan    Endometrial hyperplasia    Malignant neoplasm of lower-outer quadrant of left female breast    Triple negative malignant neoplasm of breast    Malignant neoplasm of lower-outer quadrant of left breast of female, estrogen receptor negative    Port-A-Cath in place    Hypertension    Musculoskeletal pain of right upper extremity    Hypomagnesemia    Neuropathy    Malignant neoplasm of left female breast    C. difficile diarrhea       [unfilled]    Social History     Socioeconomic History    Marital status:      Spouse name: Not on file    Number of children: Not on file    Years of education: Not on file    Highest education level: Not on file   Occupational History    Not on file   Social Needs    Financial resource strain: Not on file    Food insecurity:     Worry: Not on file     Inability: Not on file    Transportation needs:     Medical: Not on file     Non-medical: Not on file   Tobacco Use    Smoking status: Never Smoker    Smokeless tobacco: Never Used   Substance and Sexual Activity    Alcohol use: No     Alcohol/week: 0.0 standard drinks    Drug use: No    Sexual activity: Not on file   Lifestyle    Physical activity:     Days per week: Not on file     Minutes per session: Not on file    Stress: Not on file   Relationships    Social connections:     Talks on phone: Not on file     Gets together: Not on file     Attends Cheondoism service: Not on file     Active member of club or organization: Not on file     Attends  meetings of clubs or organizations: Not on file     Relationship status: Not on file   Other Topics Concern    Not on file   Social History Narrative    Not on file     DATE OF PROCEDURE:  09/12/2019     PREOPERATIVE DIAGNOSIS:  Breast cancer.     POSTOPERATIVE DIAGNOSIS:  Breast cancer.     PROCEDURES PERFORMED:  Immediate bilateral breast reconstruction using tissue   expanders, placement of bilateral Prevena wound VACs.     SURGEON:  Holedn Abernathy M.D., FACS     ANESTHESIA:  General.     COMPLICATIONS:  None.     BLOOD LOSS:  Minimal.      Review of Systems: s/p breast reconstruction, no acute issues    Objective:     Physical Exam:  There were no vitals filed for this visit.    WD WN NAD  VSS  Normal resp effort  R breast - incision CDI, no erythema or drainage, surgically absent nipple, small seroma along inferior pole of breast  L breast - incision CDI, no erythema or drainage, surgically absent nipple, small seroma along inferior pole of breast        Assessment:       No diagnosis found.    Plan:   44 y.o. female status post B TE breast recon  - Doing well, no issues  - Small uncomplicated seroma of bilateral breast, no sign of infection.   _ Patient complains of L upper extremity pain and muscle spasm like pains in L breast, feels this is secondary to lifting a gallon of milk. Normal examination, will not expand today.   - Patient notified of + C diff culture yesterday, placed on PO Vancomycin. Patient reports she was told that she was NOT contagious, I assured her that C Diff is most certainly contagious. Encouraged her to wash her hands frequently with soap and water. Advised that alcohol based hand  is not effective with C Diff. Patient and  voiced understanding.   - RTC x 2 weeks, appointment scheduled.   The patient was advised to call the clinic with any questions or concerns prior to their next visit.

## 2019-10-15 NOTE — TELEPHONE ENCOUNTER
----- Message from Jimmy eHrnandez sent at 10/15/2019  2:26 PM CDT -----  Contact: Pt   Type:  Needs Medical Advice    Who Called: The Pt was calling to find out when the Pt is supposed to start her radiation treatment and Chemo.  Please contact the Pt to give advice.    Best Call Back Number: [phone]

## 2019-10-25 ENCOUNTER — TELEPHONE (OUTPATIENT)
Dept: SURGERY | Facility: CLINIC | Age: 44
End: 2019-10-25

## 2019-10-25 ENCOUNTER — TELEPHONE (OUTPATIENT)
Dept: PLASTIC SURGERY | Facility: CLINIC | Age: 44
End: 2019-10-25

## 2019-10-25 NOTE — TELEPHONE ENCOUNTER
"----- Message from Laura Newsome sent at 10/25/2019  1:44 PM CDT -----  Contact: Blessing   Consult/Advisory:    Name Of Caller: FLETCHER Maddox   Provider Name: Mary Rousseau MD  What is the nature of the call?:  --- pt requesting to be contacted as soon as possible.  Please call and consult   Does patient feel the need to be seen today? No   Relationship to the Pt?: self   Contact Preference?: [phone]    Additional Notes:  "Thank you for all that you do for our patients'"    "

## 2019-10-25 NOTE — TELEPHONE ENCOUNTER
Return call to pt. States she just had her appt with Radiation Oncology at [facility] and was told to contact this office regarding her tissue expanders to prep for readiation. States she needs to speak with Anastacia Tucker. Call transferred to Zheng Jin in plastic surgery to see what pt needs to prep for her radiation.

## 2019-10-25 NOTE — TELEPHONE ENCOUNTER
Informed pt that her the air in tissue expanders will be replaced with saline on her next appt and she is able to start radiation there after. Pt verbalized understanding.

## 2019-10-26 ENCOUNTER — PATIENT MESSAGE (OUTPATIENT)
Dept: ADMINISTRATIVE | Facility: OTHER | Age: 44
End: 2019-10-26

## 2019-10-30 ENCOUNTER — OFFICE VISIT (OUTPATIENT)
Dept: PLASTIC SURGERY | Facility: CLINIC | Age: 44
End: 2019-10-30
Payer: MEDICAID

## 2019-10-30 VITALS
BODY MASS INDEX: 41.55 KG/M2 | HEART RATE: 76 BPM | DIASTOLIC BLOOD PRESSURE: 86 MMHG | SYSTOLIC BLOOD PRESSURE: 141 MMHG | WEIGHT: 234.56 LBS

## 2019-10-30 DIAGNOSIS — Z09 SURGERY FOLLOW-UP EXAMINATION: Primary | ICD-10-CM

## 2019-10-30 PROCEDURE — 99214 OFFICE O/P EST MOD 30 MIN: CPT | Mod: PBBFAC | Performed by: PHYSICIAN ASSISTANT

## 2019-10-30 PROCEDURE — 99024 PR POST-OP FOLLOW-UP VISIT: ICD-10-PCS | Mod: ,,, | Performed by: PHYSICIAN ASSISTANT

## 2019-10-30 PROCEDURE — 99999 PR PBB SHADOW E&M-EST. PATIENT-LVL IV: CPT | Mod: PBBFAC,,, | Performed by: PHYSICIAN ASSISTANT

## 2019-10-30 PROCEDURE — 99024 POSTOP FOLLOW-UP VISIT: CPT | Mod: ,,, | Performed by: PHYSICIAN ASSISTANT

## 2019-10-30 PROCEDURE — 99999 PR PBB SHADOW E&M-EST. PATIENT-LVL IV: ICD-10-PCS | Mod: PBBFAC,,, | Performed by: PHYSICIAN ASSISTANT

## 2019-10-30 NOTE — PROGRESS NOTES
"Subjective:      Blessing Maddox is a 44 y.o. year old female who presents to the [facility] on 10/30/2019 for follow up visit status post Bilateral immediate breast reconstruction with tissue expander placement on 09/12/2019. Patient completed PO Vancomycin treatment for positive C Diff culture. Here today for transition from air to saline.  Denies fever, chills, nausea, vomiting, or other systemic signs of infection. She was seen and evaluated by myself and Dr. Holden Abernathy      Vitals:    10/30/19 1106   BP: (!) 141/86   Pulse: 76        Review of patient's allergies indicates:   Allergen Reactions    Ciprofloxacin Other (See Comments)     Made pt lose eye sight for seven days    Flagyl [metronidazole] Swelling     THROAT       Current Outpatient Medications on File Prior to Visit   Medication Sig Dispense Refill    atorvastatin (LIPITOR) 20 MG tablet Take 1 tablet (20 mg total) by mouth once daily. 90 tablet 3    gabapentin (NEURONTIN) 300 MG capsule Take 2 capsules (600 mg total) by mouth 3 (three) times daily. 180 capsule 3    HUMALOG KWIKPEN INSULIN 100 unit/mL pen INJECT 50 UNITS INTO THE SKIN QPM  5    insulin (LANTUS SOLOSTAR U-100 INSULIN) glargine 100 units/mL (3mL) SubQ pen Inject 60 Units into the skin every evening. 18 mL 11    insulin syringe-needle U-100 0.3 mL 31 gauge x 5/16" Syrg 1 each by Misc.(Non-Drug; Combo Route) route 3 (three) times daily with meals. 100 each 5    lidocaine (LIDODERM) 5 % Place 1 patch onto the skin daily as needed. Remove & Discard patch within 12 hours or as directed by MD as needed for pain. 30 patch 5    liraglutide 0.6 mg/0.1 mL, 18 mg/3 mL, subq PNIJ (VICTOZA 2-BRITTANY) 0.6 mg/0.1 mL (18 mg/3 mL) PnIj Inject 1.8 mg into the skin once daily. 9 mL 11    lisinopril 10 MG tablet Take 1 tablet (10 mg total) by mouth once daily. 90 tablet 3    metFORMIN (GLUCOPHAGE) 1000 MG tablet Take 1 tablet (1,000 mg total) by mouth 2 (two) times daily " "with meals. 180 tablet 3    ondansetron (ZOFRAN-ODT) 8 MG TbDL Take 1 tablet (8 mg total) by mouth every 8 (eight) hours as needed. 90 tablet 3    ONETOUCH DELICA LANCETS 33 gauge Misc Inject 1 lancet into the skin 4 (four) times daily as needed. 200 each 5    ONETOUCH ULTRA BLUE TEST STRIP Strp 1 strip by Misc.(Non-Drug; Combo Route) route 4 (four) times daily as needed. 200 strip 5    ONETOUCH ULTRA2 kit U TO TEST BLOOD SUGAR D  2    oxyCODONE-acetaminophen (PERCOCET) 5-325 mg per tablet Take 1 tablet by mouth every 4 (four) hours as needed for Pain. (Patient not taking: Reported on 10/10/2019) 25 tablet 0    pen needle, diabetic 33 gauge x 5/32" Ndle 1 each by Misc.(Non-Drug; Combo Route) route every evening. 100 each 5    sulfamethoxazole-trimethoprim 800-160mg (BACTRIM DS) 800-160 mg Tab Take 1 tablet by mouth 2 (two) times daily. (Patient not taking: Reported on 10/10/2019) 12 tablet 0     Current Facility-Administered Medications on File Prior to Visit   Medication Dose Route Frequency Provider Last Rate Last Dose    diphenhydrAMINE (BENADRYL) 50 mg in sodium chloride 0.9% 50 mL IVPB  50 mg Intravenous 1 time in Clinic/HOD Melvin Chisholm MD        heparin, porcine (PF) 100 unit/mL injection flush 500 Units  500 Units Intravenous PRN Melvin Chisholm MD        heparin, porcine (PF) 100 unit/mL injection flush 500 Units  500 Units Intravenous PRN Melvin Chisholm MD   500 Units at 06/24/19 1017    lactated ringers infusion   Intravenous Continuous Nathalie Vickers MD        lidocaine (PF) 10 mg/ml (1%) injection 10 mg  1 mL Intradermal Once Nathalie Vickers MD        ondansetron disintegrating tablet 8 mg  8 mg Oral Once PRN Nathalie Vickers MD        PACLitaxel (TAXOL) 80 mg/m2 = 180 mg in sodium chloride 0.9% 280 mL chemo infusion  80 mg/m2 (Treatment Plan Recorded) Intravenous 1 time in Clinic/HOD Melvin Chisholm MD        palonosetron (ALOXI) 0.25 mg, dexamethasone " (DECADRON) 10 mg in sodium chloride 0.9% 50 mL IVPB   Intravenous 1 time in Clinic/HOD Melvin Chisholm MD           Patient Active Problem List   Diagnosis    Endometrial cancer    S/P BSO (bilateral salpingo-oophorectomy)    Vasomotor symptoms due to menopause    Depression    COOPER (stress urinary incontinence, female)    Insomnia    Diabetes mellitus    Elevated LFTs    Fatty liver    Abnormal CT scan, gastrointestinal tract    Abnormal CT scan    Endometrial hyperplasia    Malignant neoplasm of lower-outer quadrant of left female breast    Triple negative malignant neoplasm of breast    Malignant neoplasm of lower-outer quadrant of left breast of female, estrogen receptor negative    Port-A-Cath in place    Hypertension    Musculoskeletal pain of right upper extremity    Hypomagnesemia    Neuropathy    Malignant neoplasm of left female breast    C. difficile diarrhea       [unfilled]    Social History     Socioeconomic History    Marital status:      Spouse name: Not on file    Number of children: Not on file    Years of education: Not on file    Highest education level: Not on file   Occupational History    Not on file   Social Needs    Financial resource strain: Not on file    Food insecurity:     Worry: Not on file     Inability: Not on file    Transportation needs:     Medical: Not on file     Non-medical: Not on file   Tobacco Use    Smoking status: Never Smoker    Smokeless tobacco: Never Used   Substance and Sexual Activity    Alcohol use: No     Alcohol/week: 0.0 standard drinks    Drug use: No    Sexual activity: Not on file   Lifestyle    Physical activity:     Days per week: Not on file     Minutes per session: Not on file    Stress: Not on file   Relationships    Social connections:     Talks on phone: Not on file     Gets together: Not on file     Attends Bahai service: Not on file     Active member of club or organization: Not on file     Attends  meetings of clubs or organizations: Not on file     Relationship status: Not on file   Other Topics Concern    Not on file   Social History Narrative    Not on file     DATE OF PROCEDURE:  09/12/2019     PREOPERATIVE DIAGNOSIS:  Breast cancer.     POSTOPERATIVE DIAGNOSIS:  Breast cancer.     PROCEDURES PERFORMED:  Immediate bilateral breast reconstruction using tissue   expanders, placement of bilateral Prevena wound VACs.     SURGEON:  Holden Abernathy M.D., FACS     ANESTHESIA:  General.     COMPLICATIONS:  None.     BLOOD LOSS:  Minimal.      Review of Systems: s/p breast reconstruction, no acute issues    Objective:     Physical Exam:  Vitals:    10/30/19 1106   BP: (!) 141/86   Pulse: 76       WD WN NAD  VSS  Normal resp effort  R breast - incision CDI, no erythema or drainage, surgically absent nipple, small seroma along inferior pole of breast  L breast - incision CDI, no erythema or drainage, surgically absent nipple, small seroma along inferior pole of breast        Assessment:       1. Surgery follow-up examination        Plan:   44 y.o. female status post B TE breast recon  - Doing well, no issues  - All air removed from B TE, 720ccNS to RIGHT TE, 480cc NS to LEFT TE. Tolerated well.   - Planned for CT mapping Friday at [facility], then XRT x 30. Will follow up once half way through XRT.   - Will refer for consult to discuss autologous breast reconstruction once completed XRT  The patient was advised to call the clinic with any questions or concerns prior to their next visit.

## 2019-12-10 ENCOUNTER — OFFICE VISIT (OUTPATIENT)
Dept: PLASTIC SURGERY | Facility: CLINIC | Age: 44
End: 2019-12-10
Payer: MEDICAID

## 2019-12-10 VITALS
SYSTOLIC BLOOD PRESSURE: 135 MMHG | HEART RATE: 104 BPM | BODY MASS INDEX: 41.42 KG/M2 | WEIGHT: 233.81 LBS | DIASTOLIC BLOOD PRESSURE: 81 MMHG

## 2019-12-10 DIAGNOSIS — Z09 SURGERY FOLLOW-UP EXAMINATION: Primary | ICD-10-CM

## 2019-12-10 PROCEDURE — 99214 OFFICE O/P EST MOD 30 MIN: CPT | Mod: PBBFAC | Performed by: PHYSICIAN ASSISTANT

## 2019-12-10 PROCEDURE — 99999 PR PBB SHADOW E&M-EST. PATIENT-LVL IV: ICD-10-PCS | Mod: PBBFAC,,, | Performed by: PHYSICIAN ASSISTANT

## 2019-12-10 PROCEDURE — 99999 PR PBB SHADOW E&M-EST. PATIENT-LVL IV: CPT | Mod: PBBFAC,,, | Performed by: PHYSICIAN ASSISTANT

## 2019-12-10 PROCEDURE — 99024 PR POST-OP FOLLOW-UP VISIT: ICD-10-PCS | Mod: ,,, | Performed by: PHYSICIAN ASSISTANT

## 2019-12-10 PROCEDURE — 99024 POSTOP FOLLOW-UP VISIT: CPT | Mod: ,,, | Performed by: PHYSICIAN ASSISTANT

## 2019-12-12 NOTE — PROGRESS NOTES
"Subjective:      Blessing Maddox is a 44 y.o. year old female who presents to the Plastic Surgery Clinic on 12/12/2019 for follow up visit status post Bilateral immediate breast reconstruction with tissue expander placement on 09/12/2019. Here today for mid XRT follow up visit. She is currently undergoing LEFT breast XRT and tolerated well. Denies fever, chills, nausea, vomiting, or other systemic signs of infection. She was seen and evaluated by myself and Dr. Holden Abernathy      Vitals:    12/10/19 0952   BP: 135/81   Pulse: 104        Review of patient's allergies indicates:   Allergen Reactions    Ciprofloxacin Other (See Comments)     Made pt lose eye sight for seven days    Flagyl [metronidazole] Swelling     THROAT       Current Outpatient Medications on File Prior to Visit   Medication Sig Dispense Refill    atorvastatin (LIPITOR) 20 MG tablet Take 1 tablet (20 mg total) by mouth once daily. 90 tablet 3    citalopram (CELEXA) 20 MG tablet Take 1 tablet (20 mg total) by mouth once daily. Start 1/2 tablet (10 mg) x 1 week, then increase as tolerating 30 tablet 5    gabapentin (NEURONTIN) 300 MG capsule Take 2 capsules (600 mg total) by mouth 3 (three) times daily. 180 capsule 3    HUMALOG KWIKPEN INSULIN 100 unit/mL pen INJECT 50 UNITS INTO THE SKIN QPM  5    insulin (LANTUS SOLOSTAR U-100 INSULIN) glargine 100 units/mL (3mL) SubQ pen Inject 60 Units into the skin every evening. 18 mL 11    insulin syringe-needle U-100 0.3 mL 31 gauge x 5/16" Syrg 1 each by Misc.(Non-Drug; Combo Route) route 3 (three) times daily with meals. 100 each 5    lidocaine (LIDODERM) 5 % Place 1 patch onto the skin daily as needed. Remove & Discard patch within 12 hours or as directed by MD as needed for pain. 30 patch 5    liraglutide 0.6 mg/0.1 mL, 18 mg/3 mL, subq PNIJ (VICTOZA 2-BRITTANY) 0.6 mg/0.1 mL (18 mg/3 mL) PnIj Inject 1.8 mg into the skin once daily. 9 mL 11    lisinopril 10 MG tablet Take 1 tablet (10 mg " "total) by mouth once daily. 90 tablet 3    metFORMIN (GLUCOPHAGE) 1000 MG tablet Take 1 tablet (1,000 mg total) by mouth 2 (two) times daily with meals. 180 tablet 3    ondansetron (ZOFRAN-ODT) 8 MG TbDL Take 1 tablet (8 mg total) by mouth every 8 (eight) hours as needed. 90 tablet 3    ONETOUCH DELICA LANCETS 33 gauge Misc Inject 1 lancet into the skin 4 (four) times daily as needed. 200 each 5    ONETOUCH ULTRA BLUE TEST STRIP Strp 1 strip by Misc.(Non-Drug; Combo Route) route 4 (four) times daily as needed. 200 strip 5    ONETOUCH ULTRA2 kit U TO TEST BLOOD SUGAR D  2    oxyCODONE-acetaminophen (PERCOCET) 5-325 mg per tablet Take 1 tablet by mouth every 4 (four) hours as needed for Pain. (Patient not taking: Reported on 11/13/2019) 25 tablet 0    pen needle, diabetic 33 gauge x 5/32" Ndle 1 each by Misc.(Non-Drug; Combo Route) route every evening. 100 each 5    sulfamethoxazole-trimethoprim 800-160mg (BACTRIM DS) 800-160 mg Tab Take 1 tablet by mouth 2 (two) times daily. 12 tablet 0     Current Facility-Administered Medications on File Prior to Visit   Medication Dose Route Frequency Provider Last Rate Last Dose    diphenhydrAMINE (BENADRYL) 50 mg in sodium chloride 0.9% 50 mL IVPB  50 mg Intravenous 1 time in Clinic/HOD Melvin Chisholm MD        heparin, porcine (PF) 100 unit/mL injection flush 500 Units  500 Units Intravenous PRN Melvin Chisholm MD        heparin, porcine (PF) 100 unit/mL injection flush 500 Units  500 Units Intravenous PRN Melvin Chisholm MD   500 Units at 06/24/19 1017    lactated ringers infusion   Intravenous Continuous Nathalie Vickers MD        lidocaine (PF) 10 mg/ml (1%) injection 10 mg  1 mL Intradermal Once Nathalie Vickers MD        ondansetron disintegrating tablet 8 mg  8 mg Oral Once PRN Nathalie Vickers MD        PACLitaxel (TAXOL) 80 mg/m2 = 180 mg in sodium chloride 0.9% 280 mL chemo infusion  80 mg/m2 (Treatment Plan Recorded) Intravenous 1 " time in Clinic/HOD Melvin Chisholm MD        palonosetron (ALOXI) 0.25 mg, dexamethasone (DECADRON) 10 mg in sodium chloride 0.9% 50 mL IVPB   Intravenous 1 time in Clinic/HOD Melvin Chisholm MD           Patient Active Problem List   Diagnosis    Endometrial cancer    S/P BSO (bilateral salpingo-oophorectomy)    Vasomotor symptoms due to menopause    Depression    COOPER (stress urinary incontinence, female)    Insomnia    Diabetes mellitus    Elevated LFTs    Fatty liver    Abnormal CT scan, gastrointestinal tract    Abnormal CT scan    Endometrial hyperplasia    Malignant neoplasm of lower-outer quadrant of left female breast    Triple negative malignant neoplasm of breast    Malignant neoplasm of lower-outer quadrant of left breast of female, estrogen receptor negative    Port-A-Cath in place    Hypertension    Musculoskeletal pain of right upper extremity    Hypomagnesemia    Neuropathy    Malignant neoplasm of left female breast    C. difficile diarrhea       [unfilled]    Social History     Socioeconomic History    Marital status:      Spouse name: Not on file    Number of children: Not on file    Years of education: Not on file    Highest education level: Not on file   Occupational History    Not on file   Social Needs    Financial resource strain: Not on file    Food insecurity:     Worry: Not on file     Inability: Not on file    Transportation needs:     Medical: Not on file     Non-medical: Not on file   Tobacco Use    Smoking status: Never Smoker    Smokeless tobacco: Never Used   Substance and Sexual Activity    Alcohol use: No     Alcohol/week: 0.0 standard drinks    Drug use: No    Sexual activity: Not on file   Lifestyle    Physical activity:     Days per week: Not on file     Minutes per session: Not on file    Stress: Not on file   Relationships    Social connections:     Talks on phone: Not on file     Gets together: Not on file     Attends Jehovah's witness  service: Not on file     Active member of club or organization: Not on file     Attends meetings of clubs or organizations: Not on file     Relationship status: Not on file   Other Topics Concern    Not on file   Social History Narrative    Not on file     DATE OF PROCEDURE:  09/12/2019     PREOPERATIVE DIAGNOSIS:  Breast cancer.     POSTOPERATIVE DIAGNOSIS:  Breast cancer.     PROCEDURES PERFORMED:  Immediate bilateral breast reconstruction using tissue   expanders, placement of bilateral Prevena wound VACs.     SURGEON:  Holden Abernathy M.D., FACS     ANESTHESIA:  General.     COMPLICATIONS:  None.     BLOOD LOSS:  Minimal.      Review of Systems: s/p breast reconstruction, no acute issues    Objective:     Physical Exam:  Vitals:    12/10/19 0952   BP: 135/81   Pulse: 104       WD WN NAD  VSS  Normal resp effort  R breast - incision CDI, no erythema or drainage, surgically absent nipple  L breast - incision CDI, no erythema or drainage, surgically absent nipple, + skin changes 2/2 XRT        Assessment:       1. Surgery follow-up examination        Plan:   44 y.o. female status post B TE breast recon  - Doing well, no issues  - Currently undergoing LEFT breast XRT. Will not expand today.   - RTC upon completion of XRT. All questions were answered.     The patient was advised to call the clinic with any questions or concerns prior to their next visit.

## 2020-01-01 ENCOUNTER — TELEPHONE (OUTPATIENT)
Dept: PHARMACY | Facility: CLINIC | Age: 45
End: 2020-01-01

## 2020-01-01 ENCOUNTER — TELEPHONE (OUTPATIENT)
Dept: PLASTIC SURGERY | Facility: CLINIC | Age: 45
End: 2020-01-01

## 2020-01-01 ENCOUNTER — PATIENT MESSAGE (OUTPATIENT)
Dept: PLASTIC SURGERY | Facility: CLINIC | Age: 45
End: 2020-01-01

## 2020-01-01 ENCOUNTER — PATIENT OUTREACH (OUTPATIENT)
Dept: ADMINISTRATIVE | Facility: HOSPITAL | Age: 45
End: 2020-01-01

## 2020-01-01 ENCOUNTER — TELEPHONE (OUTPATIENT)
Dept: OBSTETRICS AND GYNECOLOGY | Facility: CLINIC | Age: 45
End: 2020-01-01

## 2020-01-01 ENCOUNTER — OFFICE VISIT (OUTPATIENT)
Dept: PLASTIC SURGERY | Facility: CLINIC | Age: 45
End: 2020-01-01
Payer: MEDICAID

## 2020-01-01 ENCOUNTER — PATIENT OUTREACH (OUTPATIENT)
Dept: EMERGENCY MEDICINE | Facility: HOSPITAL | Age: 45
End: 2020-01-01

## 2020-01-01 ENCOUNTER — TELEPHONE (OUTPATIENT)
Dept: PHYSICAL MEDICINE AND REHAB | Facility: CLINIC | Age: 45
End: 2020-01-01

## 2020-01-01 ENCOUNTER — LAB VISIT (OUTPATIENT)
Dept: URGENT CARE | Facility: CLINIC | Age: 45
End: 2020-01-01
Payer: MEDICAID

## 2020-01-01 ENCOUNTER — HOSPITAL ENCOUNTER (INPATIENT)
Facility: OTHER | Age: 45
LOS: 4 days | Discharge: HOME | DRG: 584 | End: 2020-09-06
Attending: PLASTIC SURGERY | Admitting: PLASTIC SURGERY
Payer: MEDICAID

## 2020-01-01 ENCOUNTER — DOCUMENTATION ONLY (OUTPATIENT)
Dept: PLASTIC SURGERY | Facility: CLINIC | Age: 45
End: 2020-01-01

## 2020-01-01 ENCOUNTER — ANESTHESIA EVENT (OUTPATIENT)
Dept: SURGERY | Facility: OTHER | Age: 45
DRG: 584 | End: 2020-01-01
Payer: MEDICAID

## 2020-01-01 ENCOUNTER — ANESTHESIA (OUTPATIENT)
Dept: SURGERY | Facility: OTHER | Age: 45
DRG: 584 | End: 2020-01-01
Payer: MEDICAID

## 2020-01-01 ENCOUNTER — HOSPITAL ENCOUNTER (OUTPATIENT)
Dept: PREADMISSION TESTING | Facility: OTHER | Age: 45
Discharge: HOME | End: 2020-08-26
Attending: PLASTIC SURGERY
Payer: MEDICAID

## 2020-01-01 ENCOUNTER — OFFICE VISIT (OUTPATIENT)
Dept: NEUROLOGY | Facility: CLINIC | Age: 45
End: 2020-01-01
Payer: MEDICAID

## 2020-01-01 ENCOUNTER — HOSPITAL ENCOUNTER (EMERGENCY)
Facility: HOSPITAL | Age: 45
Discharge: HOME | End: 2020-04-10
Attending: EMERGENCY MEDICINE
Payer: MEDICAID

## 2020-01-01 VITALS
BODY MASS INDEX: 42.23 KG/M2 | SYSTOLIC BLOOD PRESSURE: 115 MMHG | DIASTOLIC BLOOD PRESSURE: 82 MMHG | WEIGHT: 238.31 LBS | HEIGHT: 63 IN | HEART RATE: 89 BPM

## 2020-01-01 VITALS
SYSTOLIC BLOOD PRESSURE: 132 MMHG | BODY MASS INDEX: 43.24 KG/M2 | WEIGHT: 235 LBS | DIASTOLIC BLOOD PRESSURE: 92 MMHG | HEIGHT: 62 IN | HEART RATE: 110 BPM | RESPIRATION RATE: 16 BRPM | TEMPERATURE: 98 F | OXYGEN SATURATION: 95 %

## 2020-01-01 VITALS
OXYGEN SATURATION: 96 % | SYSTOLIC BLOOD PRESSURE: 130 MMHG | BODY MASS INDEX: 43.24 KG/M2 | WEIGHT: 235 LBS | TEMPERATURE: 98 F | DIASTOLIC BLOOD PRESSURE: 77 MMHG | HEART RATE: 90 BPM | HEIGHT: 62 IN

## 2020-01-01 VITALS
WEIGHT: 235 LBS | HEIGHT: 63 IN | SYSTOLIC BLOOD PRESSURE: 124 MMHG | RESPIRATION RATE: 16 BRPM | BODY MASS INDEX: 41.64 KG/M2 | DIASTOLIC BLOOD PRESSURE: 77 MMHG | HEART RATE: 86 BPM | TEMPERATURE: 98 F | OXYGEN SATURATION: 97 %

## 2020-01-01 VITALS
BODY MASS INDEX: 39.44 KG/M2 | RESPIRATION RATE: 16 BRPM | DIASTOLIC BLOOD PRESSURE: 62 MMHG | HEART RATE: 102 BPM | SYSTOLIC BLOOD PRESSURE: 110 MMHG | TEMPERATURE: 97 F | HEIGHT: 62 IN | WEIGHT: 214.31 LBS

## 2020-01-01 VITALS
HEIGHT: 63 IN | DIASTOLIC BLOOD PRESSURE: 76 MMHG | HEART RATE: 87 BPM | BODY MASS INDEX: 41.64 KG/M2 | SYSTOLIC BLOOD PRESSURE: 123 MMHG | WEIGHT: 235 LBS

## 2020-01-01 VITALS
HEART RATE: 91 BPM | DIASTOLIC BLOOD PRESSURE: 80 MMHG | WEIGHT: 238.13 LBS | BODY MASS INDEX: 42.18 KG/M2 | SYSTOLIC BLOOD PRESSURE: 121 MMHG

## 2020-01-01 DIAGNOSIS — Z85.3 HISTORY OF BREAST CANCER: ICD-10-CM

## 2020-01-01 DIAGNOSIS — F41.9 ANXIETY: ICD-10-CM

## 2020-01-01 DIAGNOSIS — Z09 SURGERY FOLLOW-UP EXAMINATION: Primary | ICD-10-CM

## 2020-01-01 DIAGNOSIS — Z85.3 PERSONAL HISTORY OF MALIGNANT NEOPLASM OF BREAST: Primary | ICD-10-CM

## 2020-01-01 DIAGNOSIS — N64.4 BREAST PAIN: Primary | ICD-10-CM

## 2020-01-01 DIAGNOSIS — R56.9 SEIZURE-LIKE ACTIVITY: Primary | ICD-10-CM

## 2020-01-01 DIAGNOSIS — Z01.818 PREOP TESTING: ICD-10-CM

## 2020-01-01 DIAGNOSIS — T66.XXXD ADVERSE EFFECT OF RADIATION, SUBSEQUENT ENCOUNTER: ICD-10-CM

## 2020-01-01 DIAGNOSIS — Z85.3 PERSONAL HISTORY OF BREAST CANCER: Primary | ICD-10-CM

## 2020-01-01 DIAGNOSIS — E11.69 TYPE 2 DIABETES MELLITUS WITH HYPERLIPIDEMIA: Primary | ICD-10-CM

## 2020-01-01 DIAGNOSIS — Z98.890 S/P BREAST RECONSTRUCTION: ICD-10-CM

## 2020-01-01 DIAGNOSIS — E78.5 TYPE 2 DIABETES MELLITUS WITH HYPERLIPIDEMIA: Primary | ICD-10-CM

## 2020-01-01 DIAGNOSIS — Z01.818 PRE-OP TESTING: Primary | ICD-10-CM

## 2020-01-01 LAB
ANION GAP SERPL CALC-SCNC: 11 MMOL/L (ref 8–16)
BASOPHILS # BLD AUTO: 0.01 K/UL (ref 0–0.2)
BASOPHILS NFR BLD: 0.1 % (ref 0–1.9)
BUN SERPL-MCNC: 3 MG/DL (ref 6–20)
CALCIUM SERPL-MCNC: 8.5 MG/DL (ref 8.7–10.5)
CHLORIDE SERPL-SCNC: 103 MMOL/L (ref 95–110)
CO2 SERPL-SCNC: 25 MMOL/L (ref 23–29)
CREAT SERPL-MCNC: 0.6 MG/DL (ref 0.5–1.4)
DIFFERENTIAL METHOD: ABNORMAL
EOSINOPHIL # BLD AUTO: 0.1 K/UL (ref 0–0.5)
EOSINOPHIL NFR BLD: 1.4 % (ref 0–8)
ERYTHROCYTE [DISTWIDTH] IN BLOOD BY AUTOMATED COUNT: 15.4 % (ref 11.5–14.5)
EST. GFR  (AFRICAN AMERICAN): >60 ML/MIN/1.73 M^2
EST. GFR  (NON AFRICAN AMERICAN): >60 ML/MIN/1.73 M^2
FINAL PATHOLOGIC DIAGNOSIS: NORMAL
FINAL PATHOLOGIC DIAGNOSIS: NORMAL
GLUCOSE SERPL-MCNC: 171 MG/DL (ref 70–110)
GROSS: NORMAL
GROSS: NORMAL
HCT VFR BLD AUTO: 31.8 % (ref 37–48.5)
HGB BLD-MCNC: 10 G/DL (ref 12–16)
IMM GRANULOCYTES # BLD AUTO: 0.01 K/UL (ref 0–0.04)
IMM GRANULOCYTES NFR BLD AUTO: 0.1 % (ref 0–0.5)
LYMPHOCYTES # BLD AUTO: 0.9 K/UL (ref 1–4.8)
LYMPHOCYTES NFR BLD: 10.9 % (ref 18–48)
MCH RBC QN AUTO: 25.7 PG (ref 27–31)
MCHC RBC AUTO-ENTMCNC: 31.4 G/DL (ref 32–36)
MCV RBC AUTO: 82 FL (ref 82–98)
MONOCYTES # BLD AUTO: 0.4 K/UL (ref 0.3–1)
MONOCYTES NFR BLD: 4.8 % (ref 4–15)
NEUTROPHILS # BLD AUTO: 6.9 K/UL (ref 1.8–7.7)
NEUTROPHILS NFR BLD: 82.7 % (ref 38–73)
NRBC BLD-RTO: 0 /100 WBC
PLATELET # BLD AUTO: 248 K/UL (ref 150–350)
PMV BLD AUTO: 9.4 FL (ref 9.2–12.9)
POCT GLUCOSE: 122 MG/DL (ref 70–110)
POCT GLUCOSE: 127 MG/DL (ref 70–110)
POCT GLUCOSE: 148 MG/DL (ref 70–110)
POCT GLUCOSE: 155 MG/DL (ref 70–110)
POCT GLUCOSE: 157 MG/DL (ref 70–110)
POCT GLUCOSE: 164 MG/DL (ref 70–110)
POCT GLUCOSE: 168 MG/DL (ref 70–110)
POCT GLUCOSE: 169 MG/DL (ref 70–110)
POCT GLUCOSE: 171 MG/DL (ref 70–110)
POCT GLUCOSE: 171 MG/DL (ref 70–110)
POCT GLUCOSE: 190 MG/DL (ref 70–110)
POCT GLUCOSE: 193 MG/DL (ref 70–110)
POCT GLUCOSE: 199 MG/DL (ref 70–110)
POCT GLUCOSE: 200 MG/DL (ref 70–110)
POCT GLUCOSE: 205 MG/DL (ref 70–110)
POTASSIUM SERPL-SCNC: 3.7 MMOL/L (ref 3.5–5.1)
RBC # BLD AUTO: 3.89 M/UL (ref 4–5.4)
SARS-COV-2 RNA RESP QL NAA+PROBE: NOT DETECTED
SODIUM SERPL-SCNC: 139 MMOL/L (ref 136–145)
WBC # BLD AUTO: 8.35 K/UL (ref 3.9–12.7)

## 2020-01-01 PROCEDURE — P9045 ALBUMIN (HUMAN), 5%, 250 ML: HCPCS | Mod: JG | Performed by: NURSE ANESTHETIST, CERTIFIED REGISTERED

## 2020-01-01 PROCEDURE — 36000709 HC OR TIME LEV III EA ADD 15 MIN: Performed by: PLASTIC SURGERY

## 2020-01-01 PROCEDURE — 25000003 PHARM REV CODE 250: Performed by: PLASTIC SURGERY

## 2020-01-01 PROCEDURE — 99900035 HC TECH TIME PER 15 MIN (STAT)

## 2020-01-01 PROCEDURE — 82962 GLUCOSE BLOOD TEST: CPT | Performed by: PLASTIC SURGERY

## 2020-01-01 PROCEDURE — 99204 PR OFFICE/OUTPT VISIT, NEW, LEVL IV, 45-59 MIN: ICD-10-PCS | Mod: S$PBB,,, | Performed by: PHYSICIAN ASSISTANT

## 2020-01-01 PROCEDURE — 27000221 HC OXYGEN, UP TO 24 HOURS

## 2020-01-01 PROCEDURE — 63600175 PHARM REV CODE 636 W HCPCS: Performed by: STUDENT IN AN ORGANIZED HEALTH CARE EDUCATION/TRAINING PROGRAM

## 2020-01-01 PROCEDURE — 25000003 PHARM REV CODE 250: Performed by: NURSE ANESTHETIST, CERTIFIED REGISTERED

## 2020-01-01 PROCEDURE — 27800903 OPTIME MED/SURG SUP & DEVICES OTHER IMPLANTS: Performed by: PLASTIC SURGERY

## 2020-01-01 PROCEDURE — 63600175 PHARM REV CODE 636 W HCPCS: Performed by: NURSE ANESTHETIST, CERTIFIED REGISTERED

## 2020-01-01 PROCEDURE — 99214 OFFICE O/P EST MOD 30 MIN: CPT | Mod: PBBFAC | Performed by: PHYSICIAN ASSISTANT

## 2020-01-01 PROCEDURE — 11000001 HC ACUTE MED/SURG PRIVATE ROOM

## 2020-01-01 PROCEDURE — 94761 N-INVAS EAR/PLS OXIMETRY MLT: CPT

## 2020-01-01 PROCEDURE — 99283 PR EMERGENCY DEPT VISIT,LEVEL III: ICD-10-PCS | Mod: ,,, | Performed by: PHYSICIAN ASSISTANT

## 2020-01-01 PROCEDURE — 63600175 PHARM REV CODE 636 W HCPCS: Performed by: SPECIALIST

## 2020-01-01 PROCEDURE — 25000003 PHARM REV CODE 250: Performed by: STUDENT IN AN ORGANIZED HEALTH CARE EDUCATION/TRAINING PROGRAM

## 2020-01-01 PROCEDURE — 99212 OFFICE O/P EST SF 10 MIN: CPT | Mod: S$PBB,,, | Performed by: PHYSICIAN ASSISTANT

## 2020-01-01 PROCEDURE — 99213 OFFICE O/P EST LOW 20 MIN: CPT | Mod: PBBFAC | Performed by: PHYSICIAN ASSISTANT

## 2020-01-01 PROCEDURE — 88300 SURGICAL PATH GROSS: CPT | Performed by: PATHOLOGY

## 2020-01-01 PROCEDURE — 93010 ELECTROCARDIOGRAM REPORT: CPT | Mod: ,,, | Performed by: INTERNAL MEDICINE

## 2020-01-01 PROCEDURE — 63600175 PHARM REV CODE 636 W HCPCS: Performed by: PLASTIC SURGERY

## 2020-01-01 PROCEDURE — 93010 EKG 12-LEAD: ICD-10-PCS | Mod: ,,, | Performed by: INTERNAL MEDICINE

## 2020-01-01 PROCEDURE — 99999 PR PBB SHADOW E&M-EST. PATIENT-LVL IV: ICD-10-PCS | Mod: PBBFAC,,, | Performed by: PHYSICIAN ASSISTANT

## 2020-01-01 PROCEDURE — 94799 UNLISTED PULMONARY SVC/PX: CPT

## 2020-01-01 PROCEDURE — 88300 PR  SURG PATH,GROSS,LEVEL I: ICD-10-PCS | Mod: 26,,, | Performed by: PATHOLOGY

## 2020-01-01 PROCEDURE — 37000009 HC ANESTHESIA EA ADD 15 MINS: Performed by: PLASTIC SURGERY

## 2020-01-01 PROCEDURE — 93005 ELECTROCARDIOGRAM TRACING: CPT

## 2020-01-01 PROCEDURE — U0003 INFECTIOUS AGENT DETECTION BY NUCLEIC ACID (DNA OR RNA); SEVERE ACUTE RESPIRATORY SYNDROME CORONAVIRUS 2 (SARS-COV-2) (CORONAVIRUS DISEASE [COVID-19]), AMPLIFIED PROBE TECHNIQUE, MAKING USE OF HIGH THROUGHPUT TECHNOLOGIES AS DESCRIBED BY CMS-2020-01-R: HCPCS

## 2020-01-01 PROCEDURE — 80048 BASIC METABOLIC PNL TOTAL CA: CPT

## 2020-01-01 PROCEDURE — 99212 PR OFFICE/OUTPT VISIT, EST, LEVL II, 10-19 MIN: ICD-10-PCS | Mod: S$PBB,,, | Performed by: PHYSICIAN ASSISTANT

## 2020-01-01 PROCEDURE — 99204 OFFICE O/P NEW MOD 45 MIN: CPT | Mod: S$PBB,,, | Performed by: PHYSICIAN ASSISTANT

## 2020-01-01 PROCEDURE — 99213 OFFICE O/P EST LOW 20 MIN: CPT | Mod: PBBFAC | Performed by: SURGERY

## 2020-01-01 PROCEDURE — 99999 PR PBB SHADOW E&M-EST. PATIENT-LVL III: ICD-10-PCS | Mod: PBBFAC,,, | Performed by: SURGERY

## 2020-01-01 PROCEDURE — 71000033 HC RECOVERY, INTIAL HOUR: Performed by: PLASTIC SURGERY

## 2020-01-01 PROCEDURE — 99999 PR PBB SHADOW E&M-EST. PATIENT-LVL III: ICD-10-PCS | Mod: PBBFAC,,, | Performed by: PHYSICIAN ASSISTANT

## 2020-01-01 PROCEDURE — 99282 PR EMERGENCY DEPT VISIT,LEVEL II: ICD-10-PCS | Mod: ,,, | Performed by: PHYSICIAN ASSISTANT

## 2020-01-01 PROCEDURE — 99283 EMERGENCY DEPT VISIT LOW MDM: CPT | Mod: ,,, | Performed by: PHYSICIAN ASSISTANT

## 2020-01-01 PROCEDURE — C1729 CATH, DRAINAGE: HCPCS | Performed by: PLASTIC SURGERY

## 2020-01-01 PROCEDURE — 99212 OFFICE O/P EST SF 10 MIN: CPT | Mod: PBBFAC | Performed by: PHYSICIAN ASSISTANT

## 2020-01-01 PROCEDURE — 99999 PR PBB SHADOW E&M-EST. PATIENT-LVL II: CPT | Mod: PBBFAC,,, | Performed by: PHYSICIAN ASSISTANT

## 2020-01-01 PROCEDURE — 99999 PR PBB SHADOW E&M-EST. PATIENT-LVL IV: CPT | Mod: PBBFAC,,, | Performed by: PHYSICIAN ASSISTANT

## 2020-01-01 PROCEDURE — 25000003 PHARM REV CODE 250: Performed by: ANESTHESIOLOGY

## 2020-01-01 PROCEDURE — 71000039 HC RECOVERY, EACH ADD'L HOUR: Performed by: PLASTIC SURGERY

## 2020-01-01 PROCEDURE — 99283 EMERGENCY DEPT VISIT LOW MDM: CPT

## 2020-01-01 PROCEDURE — 36000708 HC OR TIME LEV III 1ST 15 MIN: Performed by: PLASTIC SURGERY

## 2020-01-01 PROCEDURE — 36415 COLL VENOUS BLD VENIPUNCTURE: CPT

## 2020-01-01 PROCEDURE — 88300 SURGICAL PATH GROSS: CPT | Mod: 26,,, | Performed by: PATHOLOGY

## 2020-01-01 PROCEDURE — 99212 PR OFFICE/OUTPT VISIT, EST, LEVL II, 10-19 MIN: ICD-10-PCS | Mod: S$PBB,,, | Performed by: SURGERY

## 2020-01-01 PROCEDURE — 99212 OFFICE O/P EST SF 10 MIN: CPT | Mod: S$PBB,,, | Performed by: SURGERY

## 2020-01-01 PROCEDURE — 99999 PR PBB SHADOW E&M-EST. PATIENT-LVL III: CPT | Mod: PBBFAC,,, | Performed by: SURGERY

## 2020-01-01 PROCEDURE — 99999 PR PBB SHADOW E&M-EST. PATIENT-LVL II: ICD-10-PCS | Mod: PBBFAC,,, | Performed by: PHYSICIAN ASSISTANT

## 2020-01-01 PROCEDURE — 85025 COMPLETE CBC W/AUTO DIFF WBC: CPT

## 2020-01-01 PROCEDURE — 27201423 OPTIME MED/SURG SUP & DEVICES STERILE SUPPLY: Performed by: PLASTIC SURGERY

## 2020-01-01 PROCEDURE — C9290 INJ, BUPIVACAINE LIPOSOME: HCPCS | Performed by: PLASTIC SURGERY

## 2020-01-01 PROCEDURE — 37000008 HC ANESTHESIA 1ST 15 MINUTES: Performed by: PLASTIC SURGERY

## 2020-01-01 PROCEDURE — 99282 EMERGENCY DEPT VISIT SF MDM: CPT | Mod: ,,, | Performed by: PHYSICIAN ASSISTANT

## 2020-01-01 PROCEDURE — 99999 PR PBB SHADOW E&M-EST. PATIENT-LVL III: CPT | Mod: PBBFAC,,, | Performed by: PHYSICIAN ASSISTANT

## 2020-01-01 PROCEDURE — 25000003 PHARM REV CODE 250: Performed by: SPECIALIST

## 2020-01-01 DEVICE — COUPLER MICROVAS ANSTMS 2.5MM: Type: IMPLANTABLE DEVICE | Site: BREAST | Status: FUNCTIONAL

## 2020-01-01 DEVICE — COUPLER 2.0MM: Type: IMPLANTABLE DEVICE | Site: BREAST | Status: FUNCTIONAL

## 2020-01-01 DEVICE — COUPLER SYSTEM MICRO VASC ANAS: Type: IMPLANTABLE DEVICE | Site: BREAST | Status: FUNCTIONAL

## 2020-01-01 RX ORDER — FENTANYL CITRATE 50 UG/ML
INJECTION, SOLUTION INTRAMUSCULAR; INTRAVENOUS
Status: DISCONTINUED | OUTPATIENT
Start: 2020-01-01 | End: 2020-01-01

## 2020-01-01 RX ORDER — EPHEDRINE SULFATE 50 MG/ML
INJECTION, SOLUTION INTRAVENOUS
Status: DISCONTINUED | OUTPATIENT
Start: 2020-01-01 | End: 2020-01-01

## 2020-01-01 RX ORDER — OXYCODONE HYDROCHLORIDE 5 MG/1
5 TABLET ORAL
Status: DISCONTINUED | OUTPATIENT
Start: 2020-01-01 | End: 2020-01-01 | Stop reason: HOSPADM

## 2020-01-01 RX ORDER — VECURONIUM BROMIDE FOR INJECTION 1 MG/ML
INJECTION, POWDER, LYOPHILIZED, FOR SOLUTION INTRAVENOUS
Status: DISCONTINUED | OUTPATIENT
Start: 2020-01-01 | End: 2020-01-01

## 2020-01-01 RX ORDER — LIDOCAINE HYDROCHLORIDE AND EPINEPHRINE 10; 10 MG/ML; UG/ML
INJECTION, SOLUTION INFILTRATION; PERINEURAL
Status: DISCONTINUED | OUTPATIENT
Start: 2020-01-01 | End: 2020-01-01 | Stop reason: HOSPADM

## 2020-01-01 RX ORDER — MIDAZOLAM HYDROCHLORIDE 1 MG/ML
INJECTION INTRAMUSCULAR; INTRAVENOUS
Status: DISCONTINUED | OUTPATIENT
Start: 2020-01-01 | End: 2020-01-01

## 2020-01-01 RX ORDER — GABAPENTIN 300 MG/1
900 CAPSULE ORAL 3 TIMES DAILY
Status: DISCONTINUED | OUTPATIENT
Start: 2020-01-01 | End: 2020-01-01 | Stop reason: HOSPADM

## 2020-01-01 RX ORDER — CEPHALEXIN 500 MG/1
500 CAPSULE ORAL EVERY 6 HOURS
Qty: 28 CAPSULE | Refills: 0 | Status: SHIPPED | OUTPATIENT
Start: 2020-01-01 | End: 2020-01-01

## 2020-01-01 RX ORDER — ACETAMINOPHEN 325 MG/1
650 TABLET ORAL EVERY 4 HOURS PRN
Status: DISCONTINUED | OUTPATIENT
Start: 2020-01-01 | End: 2020-01-01 | Stop reason: HOSPADM

## 2020-01-01 RX ORDER — IBUPROFEN 200 MG
16 TABLET ORAL
Status: DISCONTINUED | OUTPATIENT
Start: 2020-01-01 | End: 2020-01-01 | Stop reason: HOSPADM

## 2020-01-01 RX ORDER — SODIUM CHLORIDE 0.9 % (FLUSH) 0.9 %
3 SYRINGE (ML) INJECTION
Status: DISCONTINUED | OUTPATIENT
Start: 2020-01-01 | End: 2020-01-01

## 2020-01-01 RX ORDER — HEPARIN SODIUM 10000 [USP'U]/ML
INJECTION, SOLUTION INTRAVENOUS; SUBCUTANEOUS
Status: DISCONTINUED | OUTPATIENT
Start: 2020-01-01 | End: 2020-01-01 | Stop reason: HOSPADM

## 2020-01-01 RX ORDER — ATORVASTATIN CALCIUM 20 MG/1
20 TABLET, FILM COATED ORAL DAILY
Status: DISCONTINUED | OUTPATIENT
Start: 2020-01-01 | End: 2020-01-01 | Stop reason: HOSPADM

## 2020-01-01 RX ORDER — HYDROMORPHONE HYDROCHLORIDE 2 MG/ML
INJECTION, SOLUTION INTRAMUSCULAR; INTRAVENOUS; SUBCUTANEOUS
Status: DISCONTINUED | OUTPATIENT
Start: 2020-01-01 | End: 2020-01-01

## 2020-01-01 RX ORDER — PHENYLEPHRINE HYDROCHLORIDE 10 MG/ML
INJECTION INTRAVENOUS
Status: DISCONTINUED | OUTPATIENT
Start: 2020-01-01 | End: 2020-01-01

## 2020-01-01 RX ORDER — AMOXICILLIN 250 MG
1 CAPSULE ORAL 2 TIMES DAILY
Status: DISCONTINUED | OUTPATIENT
Start: 2020-01-01 | End: 2020-01-01 | Stop reason: HOSPADM

## 2020-01-01 RX ORDER — PAPAVERINE HYDROCHLORIDE 30 MG/ML
INJECTION INTRAMUSCULAR; INTRAVENOUS
Status: DISCONTINUED | OUTPATIENT
Start: 2020-01-01 | End: 2020-01-01 | Stop reason: HOSPADM

## 2020-01-01 RX ORDER — CYCLOBENZAPRINE HCL 10 MG
10 TABLET ORAL 3 TIMES DAILY
Status: DISCONTINUED | OUTPATIENT
Start: 2020-01-01 | End: 2020-01-01 | Stop reason: HOSPADM

## 2020-01-01 RX ORDER — GLUCAGON 1 MG
1 KIT INJECTION
Status: DISCONTINUED | OUTPATIENT
Start: 2020-01-01 | End: 2020-01-01

## 2020-01-01 RX ORDER — ONDANSETRON 8 MG/1
8 TABLET, ORALLY DISINTEGRATING ORAL EVERY 8 HOURS PRN
Status: DISCONTINUED | OUTPATIENT
Start: 2020-01-01 | End: 2020-01-01 | Stop reason: HOSPADM

## 2020-01-01 RX ORDER — CEFAZOLIN SODIUM 2 G/50ML
2 SOLUTION INTRAVENOUS
Status: COMPLETED | OUTPATIENT
Start: 2020-01-01 | End: 2020-01-01

## 2020-01-01 RX ORDER — LIDOCAINE HYDROCHLORIDE 10 MG/ML
0.5 INJECTION, SOLUTION EPIDURAL; INFILTRATION; INTRACAUDAL; PERINEURAL ONCE
Status: DISCONTINUED | OUTPATIENT
Start: 2020-01-01 | End: 2020-01-01 | Stop reason: HOSPADM

## 2020-01-01 RX ORDER — PROPOFOL 10 MG/ML
VIAL (ML) INTRAVENOUS
Status: DISCONTINUED | OUTPATIENT
Start: 2020-01-01 | End: 2020-01-01

## 2020-01-01 RX ORDER — SODIUM CHLORIDE, SODIUM LACTATE, POTASSIUM CHLORIDE, CALCIUM CHLORIDE 600; 310; 30; 20 MG/100ML; MG/100ML; MG/100ML; MG/100ML
INJECTION, SOLUTION INTRAVENOUS CONTINUOUS
Status: CANCELLED | OUTPATIENT
Start: 2020-01-01

## 2020-01-01 RX ORDER — DIPHENHYDRAMINE HYDROCHLORIDE 50 MG/ML
25 INJECTION INTRAMUSCULAR; INTRAVENOUS EVERY 6 HOURS PRN
Status: DISCONTINUED | OUTPATIENT
Start: 2020-01-01 | End: 2020-01-01 | Stop reason: HOSPADM

## 2020-01-01 RX ORDER — ALBUMIN HUMAN 50 G/1000ML
SOLUTION INTRAVENOUS CONTINUOUS PRN
Status: DISCONTINUED | OUTPATIENT
Start: 2020-01-01 | End: 2020-01-01

## 2020-01-01 RX ORDER — HYDROCODONE BITARTRATE AND ACETAMINOPHEN 10; 325 MG/1; MG/1
1 TABLET ORAL EVERY 4 HOURS PRN
Status: DISCONTINUED | OUTPATIENT
Start: 2020-01-01 | End: 2020-01-01 | Stop reason: HOSPADM

## 2020-01-01 RX ORDER — CEPHALEXIN 500 MG/1
500 CAPSULE ORAL EVERY 6 HOURS
Qty: 28 CAPSULE | Refills: 0 | Status: SHIPPED | OUTPATIENT
Start: 2020-01-01 | End: 2020-01-01 | Stop reason: SDUPTHER

## 2020-01-01 RX ORDER — KETAMINE HCL IN 0.9 % NACL 50 MG/5 ML
SYRINGE (ML) INTRAVENOUS
Status: DISCONTINUED | OUTPATIENT
Start: 2020-01-01 | End: 2020-01-01

## 2020-01-01 RX ORDER — SODIUM CHLORIDE, SODIUM LACTATE, POTASSIUM CHLORIDE, CALCIUM CHLORIDE 600; 310; 30; 20 MG/100ML; MG/100ML; MG/100ML; MG/100ML
INJECTION, SOLUTION INTRAVENOUS CONTINUOUS
Status: DISCONTINUED | OUTPATIENT
Start: 2020-01-01 | End: 2020-01-01

## 2020-01-01 RX ORDER — ENOXAPARIN SODIUM 100 MG/ML
40 INJECTION SUBCUTANEOUS EVERY 12 HOURS
Status: DISCONTINUED | OUTPATIENT
Start: 2020-01-01 | End: 2020-01-01 | Stop reason: HOSPADM

## 2020-01-01 RX ORDER — IBUPROFEN 200 MG
24 TABLET ORAL
Status: DISCONTINUED | OUTPATIENT
Start: 2020-01-01 | End: 2020-01-01 | Stop reason: HOSPADM

## 2020-01-01 RX ORDER — ONDANSETRON 2 MG/ML
INJECTION INTRAMUSCULAR; INTRAVENOUS
Status: DISCONTINUED | OUTPATIENT
Start: 2020-01-01 | End: 2020-01-01

## 2020-01-01 RX ORDER — GLYCOPYRROLATE 0.2 MG/ML
INJECTION INTRAMUSCULAR; INTRAVENOUS
Status: DISCONTINUED | OUTPATIENT
Start: 2020-01-01 | End: 2020-01-01

## 2020-01-01 RX ORDER — HYDROCODONE BITARTRATE AND ACETAMINOPHEN 5; 325 MG/1; MG/1
1 TABLET ORAL EVERY 4 HOURS PRN
Qty: 30 TABLET | Refills: 0 | Status: SHIPPED | OUTPATIENT
Start: 2020-01-01 | End: 2020-01-01

## 2020-01-01 RX ORDER — INSULIN ASPART 100 [IU]/ML
1-10 INJECTION, SOLUTION INTRAVENOUS; SUBCUTANEOUS
Status: DISCONTINUED | OUTPATIENT
Start: 2020-01-01 | End: 2020-01-01 | Stop reason: HOSPADM

## 2020-01-01 RX ORDER — HYDROCODONE BITARTRATE AND ACETAMINOPHEN 5; 325 MG/1; MG/1
1 TABLET ORAL EVERY 4 HOURS PRN
Status: DISCONTINUED | OUTPATIENT
Start: 2020-01-01 | End: 2020-01-01 | Stop reason: HOSPADM

## 2020-01-01 RX ORDER — LIDOCAINE HYDROCHLORIDE 10 MG/ML
INJECTION, SOLUTION INTRAVENOUS
Status: DISCONTINUED | OUTPATIENT
Start: 2020-01-01 | End: 2020-01-01

## 2020-01-01 RX ORDER — LIDOCAINE HYDROCHLORIDE 10 MG/ML
0.5 INJECTION, SOLUTION EPIDURAL; INFILTRATION; INTRACAUDAL; PERINEURAL ONCE
Status: CANCELLED | OUTPATIENT
Start: 2020-01-01 | End: 2020-01-01

## 2020-01-01 RX ORDER — ROCURONIUM BROMIDE 10 MG/ML
INJECTION, SOLUTION INTRAVENOUS
Status: DISCONTINUED | OUTPATIENT
Start: 2020-01-01 | End: 2020-01-01

## 2020-01-01 RX ORDER — ACETAMINOPHEN 500 MG
1000 TABLET ORAL
Status: CANCELLED | OUTPATIENT
Start: 2020-01-01 | End: 2020-01-01

## 2020-01-01 RX ORDER — ONDANSETRON 2 MG/ML
4 INJECTION INTRAMUSCULAR; INTRAVENOUS DAILY PRN
Status: DISCONTINUED | OUTPATIENT
Start: 2020-01-01 | End: 2020-01-01 | Stop reason: HOSPADM

## 2020-01-01 RX ORDER — MEPERIDINE HYDROCHLORIDE 25 MG/ML
12.5 INJECTION INTRAMUSCULAR; INTRAVENOUS; SUBCUTANEOUS ONCE AS NEEDED
Status: DISCONTINUED | OUTPATIENT
Start: 2020-01-01 | End: 2020-01-01 | Stop reason: HOSPADM

## 2020-01-01 RX ORDER — GLUCAGON 1 MG
1 KIT INJECTION
Status: DISCONTINUED | OUTPATIENT
Start: 2020-01-01 | End: 2020-01-01 | Stop reason: HOSPADM

## 2020-01-01 RX ORDER — HYDROCODONE BITARTRATE AND ACETAMINOPHEN 5; 325 MG/1; MG/1
1 TABLET ORAL EVERY 4 HOURS PRN
Qty: 30 TABLET | Refills: 0 | OUTPATIENT
Start: 2020-01-01

## 2020-01-01 RX ORDER — HEPARIN SODIUM 5000 [USP'U]/ML
5000 INJECTION, SOLUTION INTRAVENOUS; SUBCUTANEOUS
Status: COMPLETED | OUTPATIENT
Start: 2020-01-01 | End: 2020-01-01

## 2020-01-01 RX ORDER — NEOSTIGMINE METHYLSULFATE 1 MG/ML
INJECTION, SOLUTION INTRAVENOUS
Status: DISCONTINUED | OUTPATIENT
Start: 2020-01-01 | End: 2020-01-01

## 2020-01-01 RX ORDER — ACETAMINOPHEN 500 MG
1000 TABLET ORAL
Status: COMPLETED | OUTPATIENT
Start: 2020-01-01 | End: 2020-01-01

## 2020-01-01 RX ORDER — SULFAMETHOXAZOLE AND TRIMETHOPRIM 800; 160 MG/1; MG/1
1 TABLET ORAL 2 TIMES DAILY
Qty: 14 TABLET | Refills: 0 | Status: SHIPPED | OUTPATIENT
Start: 2020-01-01 | End: 2020-01-01

## 2020-01-01 RX ORDER — SODIUM CHLORIDE 0.9 % (FLUSH) 0.9 %
10 SYRINGE (ML) INJECTION
Status: DISCONTINUED | OUTPATIENT
Start: 2020-01-01 | End: 2020-01-01 | Stop reason: HOSPADM

## 2020-01-01 RX ORDER — CELECOXIB 200 MG/1
400 CAPSULE ORAL
Status: COMPLETED | OUTPATIENT
Start: 2020-01-01 | End: 2020-01-01

## 2020-01-01 RX ORDER — CELECOXIB 200 MG/1
400 CAPSULE ORAL
Status: CANCELLED | OUTPATIENT
Start: 2020-01-01 | End: 2020-01-01

## 2020-01-01 RX ORDER — HYDROMORPHONE HYDROCHLORIDE 2 MG/ML
0.4 INJECTION, SOLUTION INTRAMUSCULAR; INTRAVENOUS; SUBCUTANEOUS EVERY 5 MIN PRN
Status: DISCONTINUED | OUTPATIENT
Start: 2020-01-01 | End: 2020-01-01 | Stop reason: HOSPADM

## 2020-01-01 RX ADMIN — CYCLOBENZAPRINE 10 MG: 10 TABLET, FILM COATED ORAL at 08:09

## 2020-01-01 RX ADMIN — PROPOFOL 20 MG: 10 INJECTION, EMULSION INTRAVENOUS at 09:09

## 2020-01-01 RX ADMIN — GABAPENTIN 900 MG: 300 CAPSULE ORAL at 09:09

## 2020-01-01 RX ADMIN — MIDAZOLAM HYDROCHLORIDE 5 MG: 1 INJECTION, SOLUTION INTRAMUSCULAR; INTRAVENOUS at 09:09

## 2020-01-01 RX ADMIN — PROPOFOL 200 MG: 10 INJECTION, EMULSION INTRAVENOUS at 07:09

## 2020-01-01 RX ADMIN — FENTANYL CITRATE 50 MCG: 50 INJECTION, SOLUTION INTRAMUSCULAR; INTRAVENOUS at 07:09

## 2020-01-01 RX ADMIN — DOCUSATE SODIUM 50 MG AND SENNOSIDES 8.6 MG 1 TABLET: 8.6; 5 TABLET, FILM COATED ORAL at 09:09

## 2020-01-01 RX ADMIN — HYDROCODONE BITARTRATE AND ACETAMINOPHEN 1 TABLET: 10; 325 TABLET ORAL at 02:09

## 2020-01-01 RX ADMIN — ENOXAPARIN SODIUM 40 MG: 100 INJECTION SUBCUTANEOUS at 09:09

## 2020-01-01 RX ADMIN — ACETAMINOPHEN 1000 MG: 500 TABLET, FILM COATED ORAL at 06:09

## 2020-01-01 RX ADMIN — EPHEDRINE SULFATE 5 MG: 50 INJECTION INTRAVENOUS at 07:09

## 2020-01-01 RX ADMIN — DOCUSATE SODIUM 50 MG AND SENNOSIDES 8.6 MG 1 TABLET: 8.6; 5 TABLET, FILM COATED ORAL at 08:09

## 2020-01-01 RX ADMIN — PHENYLEPHRINE HYDROCHLORIDE 200 MCG: 10 INJECTION INTRAVENOUS at 12:09

## 2020-01-01 RX ADMIN — CEFAZOLIN SODIUM 2 G: 2 SOLUTION INTRAVENOUS at 07:09

## 2020-01-01 RX ADMIN — GLYCOPYRROLATE 0.8 MG: 0.2 INJECTION, SOLUTION INTRAMUSCULAR; INTRAVENOUS at 01:09

## 2020-01-01 RX ADMIN — ROCURONIUM BROMIDE 50 MG: 10 INJECTION, SOLUTION INTRAVENOUS at 07:09

## 2020-01-01 RX ADMIN — VECURONIUM BROMIDE FOR INJECTION 3 MG: 1 INJECTION, POWDER, LYOPHILIZED, FOR SOLUTION INTRAVENOUS at 11:09

## 2020-01-01 RX ADMIN — VECURONIUM BROMIDE FOR INJECTION 2 MG: 1 INJECTION, POWDER, LYOPHILIZED, FOR SOLUTION INTRAVENOUS at 08:09

## 2020-01-01 RX ADMIN — SODIUM CHLORIDE, SODIUM LACTATE, POTASSIUM CHLORIDE, CALCIUM CHLORIDE: 600; 310; 30; 20 INJECTION, SOLUTION INTRAVENOUS at 09:09

## 2020-01-01 RX ADMIN — OXYCODONE HYDROCHLORIDE 5 MG: 5 TABLET ORAL at 02:09

## 2020-01-01 RX ADMIN — VECURONIUM BROMIDE FOR INJECTION 2 MG: 1 INJECTION, POWDER, LYOPHILIZED, FOR SOLUTION INTRAVENOUS at 09:09

## 2020-01-01 RX ADMIN — CARBOXYMETHYLCELLULOSE SODIUM 3 DROP: 2.5 SOLUTION/ DROPS OPHTHALMIC at 07:09

## 2020-01-01 RX ADMIN — ENOXAPARIN SODIUM 40 MG: 100 INJECTION SUBCUTANEOUS at 08:09

## 2020-01-01 RX ADMIN — PHENYLEPHRINE HYDROCHLORIDE 300 MCG: 10 INJECTION INTRAVENOUS at 08:09

## 2020-01-01 RX ADMIN — HYDROCODONE BITARTRATE AND ACETAMINOPHEN 1 TABLET: 10; 325 TABLET ORAL at 08:09

## 2020-01-01 RX ADMIN — ACETAMINOPHEN 650 MG: 325 TABLET, FILM COATED ORAL at 05:09

## 2020-01-01 RX ADMIN — PHENYLEPHRINE HYDROCHLORIDE 200 MCG: 10 INJECTION INTRAVENOUS at 11:09

## 2020-01-01 RX ADMIN — HYDROCODONE BITARTRATE AND ACETAMINOPHEN 1 TABLET: 10; 325 TABLET ORAL at 06:09

## 2020-01-01 RX ADMIN — GABAPENTIN 900 MG: 300 CAPSULE ORAL at 03:09

## 2020-01-01 RX ADMIN — FENTANYL CITRATE 50 MCG: 50 INJECTION, SOLUTION INTRAMUSCULAR; INTRAVENOUS at 10:09

## 2020-01-01 RX ADMIN — GABAPENTIN 900 MG: 300 CAPSULE ORAL at 08:09

## 2020-01-01 RX ADMIN — PHENYLEPHRINE HYDROCHLORIDE 100 MCG: 10 INJECTION INTRAVENOUS at 10:09

## 2020-01-01 RX ADMIN — HYDROMORPHONE HYDROCHLORIDE 0.5 MG: 2 INJECTION, SOLUTION INTRAMUSCULAR; INTRAVENOUS; SUBCUTANEOUS at 01:09

## 2020-01-01 RX ADMIN — CELECOXIB 400 MG: 200 CAPSULE ORAL at 06:09

## 2020-01-01 RX ADMIN — ALBUMIN (HUMAN): 2.5 SOLUTION INTRAVENOUS at 07:09

## 2020-01-01 RX ADMIN — FENTANYL CITRATE 100 MCG: 50 INJECTION, SOLUTION INTRAMUSCULAR; INTRAVENOUS at 07:09

## 2020-01-01 RX ADMIN — MIDAZOLAM HYDROCHLORIDE 2 MG: 1 INJECTION, SOLUTION INTRAMUSCULAR; INTRAVENOUS at 07:09

## 2020-01-01 RX ADMIN — CEFAZOLIN SODIUM 2 G: 2 SOLUTION INTRAVENOUS at 06:09

## 2020-01-01 RX ADMIN — HYDROMORPHONE HYDROCHLORIDE 0.4 MG: 2 INJECTION INTRAMUSCULAR; INTRAVENOUS; SUBCUTANEOUS at 03:09

## 2020-01-01 RX ADMIN — GABAPENTIN 900 MG: 300 CAPSULE ORAL at 01:09

## 2020-01-01 RX ADMIN — CEFAZOLIN SODIUM 2 G: 2 SOLUTION INTRAVENOUS at 09:09

## 2020-01-01 RX ADMIN — PHENYLEPHRINE HYDROCHLORIDE 300 MCG: 10 INJECTION INTRAVENOUS at 07:09

## 2020-01-01 RX ADMIN — SODIUM CHLORIDE, SODIUM LACTATE, POTASSIUM CHLORIDE, AND CALCIUM CHLORIDE: .6; .31; .03; .02 INJECTION, SOLUTION INTRAVENOUS at 03:09

## 2020-01-01 RX ADMIN — CYCLOBENZAPRINE 10 MG: 10 TABLET, FILM COATED ORAL at 09:09

## 2020-01-01 RX ADMIN — ATORVASTATIN CALCIUM 20 MG: 20 TABLET, FILM COATED ORAL at 08:09

## 2020-01-01 RX ADMIN — PROPOFOL 45 MG: 10 INJECTION, EMULSION INTRAVENOUS at 08:09

## 2020-01-01 RX ADMIN — ATORVASTATIN CALCIUM 20 MG: 20 TABLET, FILM COATED ORAL at 09:09

## 2020-01-01 RX ADMIN — PHENYLEPHRINE HYDROCHLORIDE 300 MCG: 10 INJECTION INTRAVENOUS at 09:09

## 2020-01-01 RX ADMIN — PHENYLEPHRINE HYDROCHLORIDE 200 MCG: 10 INJECTION INTRAVENOUS at 09:09

## 2020-01-01 RX ADMIN — ONDANSETRON HYDROCHLORIDE 4 MG: 2 INJECTION INTRAMUSCULAR; INTRAVENOUS at 01:09

## 2020-01-01 RX ADMIN — HYDROCODONE BITARTRATE AND ACETAMINOPHEN 1 TABLET: 10; 325 TABLET ORAL at 01:09

## 2020-01-01 RX ADMIN — SODIUM CHLORIDE, SODIUM LACTATE, POTASSIUM CHLORIDE, AND CALCIUM CHLORIDE: .6; .31; .03; .02 INJECTION, SOLUTION INTRAVENOUS at 02:09

## 2020-01-01 RX ADMIN — VECURONIUM BROMIDE FOR INJECTION 2 MG: 1 INJECTION, POWDER, LYOPHILIZED, FOR SOLUTION INTRAVENOUS at 10:09

## 2020-01-01 RX ADMIN — PHENYLEPHRINE HYDROCHLORIDE 200 MCG: 10 INJECTION INTRAVENOUS at 07:09

## 2020-01-01 RX ADMIN — INSULIN ASPART 1 UNITS: 100 INJECTION, SOLUTION INTRAVENOUS; SUBCUTANEOUS at 08:09

## 2020-01-01 RX ADMIN — HYDROCODONE BITARTRATE AND ACETAMINOPHEN 1 TABLET: 10; 325 TABLET ORAL at 10:09

## 2020-01-01 RX ADMIN — CYCLOBENZAPRINE 10 MG: 10 TABLET, FILM COATED ORAL at 05:09

## 2020-01-01 RX ADMIN — SODIUM CHLORIDE, SODIUM LACTATE, POTASSIUM CHLORIDE, CALCIUM CHLORIDE: 600; 310; 30; 20 INJECTION, SOLUTION INTRAVENOUS at 01:09

## 2020-01-01 RX ADMIN — CYCLOBENZAPRINE 10 MG: 10 TABLET, FILM COATED ORAL at 03:09

## 2020-01-01 RX ADMIN — LIDOCAINE HYDROCHLORIDE 100 MG: 10 INJECTION, SOLUTION INTRAVENOUS at 07:09

## 2020-01-01 RX ADMIN — Medication 50 MG: at 08:09

## 2020-01-01 RX ADMIN — FENTANYL CITRATE 100 MCG: 50 INJECTION, SOLUTION INTRAMUSCULAR; INTRAVENOUS at 08:09

## 2020-01-01 RX ADMIN — HEPARIN SODIUM 5000 UNITS: 5000 INJECTION, SOLUTION INTRAVENOUS; SUBCUTANEOUS at 06:09

## 2020-01-01 RX ADMIN — PROPOFOL 10 MG: 10 INJECTION, EMULSION INTRAVENOUS at 09:09

## 2020-01-01 RX ADMIN — INSULIN ASPART 4 UNITS: 100 INJECTION, SOLUTION INTRAVENOUS; SUBCUTANEOUS at 06:09

## 2020-01-01 RX ADMIN — EPHEDRINE SULFATE 10 MG: 50 INJECTION INTRAVENOUS at 09:09

## 2020-01-01 RX ADMIN — CEFAZOLIN SODIUM 2 G: 2 SOLUTION INTRAVENOUS at 11:09

## 2020-01-01 RX ADMIN — CEFAZOLIN SODIUM 2 G: 2 SOLUTION INTRAVENOUS at 03:09

## 2020-01-01 RX ADMIN — PHENYLEPHRINE HYDROCHLORIDE 0.12 MCG/KG/MIN: 10 INJECTION INTRAVENOUS at 09:09

## 2020-01-01 RX ADMIN — PHENYLEPHRINE HYDROCHLORIDE 100 MCG: 10 INJECTION INTRAVENOUS at 07:09

## 2020-01-01 RX ADMIN — HYDROCODONE BITARTRATE AND ACETAMINOPHEN 1 TABLET: 10; 325 TABLET ORAL at 09:09

## 2020-01-01 RX ADMIN — PHENYLEPHRINE HYDROCHLORIDE 200 MCG: 10 INJECTION INTRAVENOUS at 10:09

## 2020-01-01 RX ADMIN — VECURONIUM BROMIDE FOR INJECTION 2 MG: 1 INJECTION, POWDER, LYOPHILIZED, FOR SOLUTION INTRAVENOUS at 12:09

## 2020-01-01 RX ADMIN — PHENYLEPHRINE HYDROCHLORIDE 200 MCG: 10 INJECTION INTRAVENOUS at 01:09

## 2020-01-01 RX ADMIN — CEFAZOLIN SODIUM 2 G: 2 SOLUTION INTRAVENOUS at 02:09

## 2020-01-01 RX ADMIN — ONDANSETRON 8 MG: 8 TABLET, ORALLY DISINTEGRATING ORAL at 06:09

## 2020-01-01 RX ADMIN — HYDROCODONE BITARTRATE AND ACETAMINOPHEN 1 TABLET: 10; 325 TABLET ORAL at 12:09

## 2020-01-01 RX ADMIN — HYDROMORPHONE HYDROCHLORIDE 1 MG: 2 INJECTION, SOLUTION INTRAMUSCULAR; INTRAVENOUS; SUBCUTANEOUS at 02:09

## 2020-01-01 RX ADMIN — HYDROCODONE BITARTRATE AND ACETAMINOPHEN 1 TABLET: 10; 325 TABLET ORAL at 07:09

## 2020-01-01 RX ADMIN — PROPOFOL 25 MG: 10 INJECTION, EMULSION INTRAVENOUS at 11:09

## 2020-01-01 RX ADMIN — CEFAZOLIN SODIUM 2 G: 2 SOLUTION INTRAVENOUS at 10:09

## 2020-01-01 RX ADMIN — PHENYLEPHRINE HYDROCHLORIDE 100 MCG: 10 INJECTION INTRAVENOUS at 09:09

## 2020-01-01 RX ADMIN — GABAPENTIN 900 MG: 300 CAPSULE ORAL at 05:09

## 2020-01-01 RX ADMIN — SODIUM CHLORIDE, SODIUM LACTATE, POTASSIUM CHLORIDE, CALCIUM CHLORIDE: 600; 310; 30; 20 INJECTION, SOLUTION INTRAVENOUS at 06:09

## 2020-01-01 RX ADMIN — CYCLOBENZAPRINE 10 MG: 10 TABLET, FILM COATED ORAL at 01:09

## 2020-01-01 RX ADMIN — INSULIN ASPART 1 UNITS: 100 INJECTION, SOLUTION INTRAVENOUS; SUBCUTANEOUS at 10:09

## 2020-01-01 RX ADMIN — PROPOFOL 50 MG: 10 INJECTION, EMULSION INTRAVENOUS at 12:09

## 2020-01-01 RX ADMIN — INSULIN ASPART 2 UNITS: 100 INJECTION, SOLUTION INTRAVENOUS; SUBCUTANEOUS at 01:09

## 2020-01-01 RX ADMIN — PROPOFOL 50 MG: 10 INJECTION, EMULSION INTRAVENOUS at 11:09

## 2020-01-01 RX ADMIN — SODIUM CHLORIDE, SODIUM LACTATE, POTASSIUM CHLORIDE, AND CALCIUM CHLORIDE: .6; .31; .03; .02 INJECTION, SOLUTION INTRAVENOUS at 06:09

## 2020-01-01 RX ADMIN — PHENYLEPHRINE HYDROCHLORIDE 100 MCG: 10 INJECTION INTRAVENOUS at 08:09

## 2020-01-01 RX ADMIN — NEOSTIGMINE METHYLSULFATE 5 MG: 1 INJECTION INTRAVENOUS at 01:09

## 2020-01-01 RX ADMIN — FENTANYL CITRATE 50 MCG: 50 INJECTION, SOLUTION INTRAMUSCULAR; INTRAVENOUS at 12:09

## 2020-01-08 ENCOUNTER — OFFICE VISIT (OUTPATIENT)
Dept: PLASTIC SURGERY | Facility: CLINIC | Age: 45
End: 2020-01-08
Payer: MEDICAID

## 2020-01-08 VITALS
DIASTOLIC BLOOD PRESSURE: 74 MMHG | BODY MASS INDEX: 40.22 KG/M2 | HEART RATE: 94 BPM | SYSTOLIC BLOOD PRESSURE: 126 MMHG | WEIGHT: 227.06 LBS

## 2020-01-08 DIAGNOSIS — Z85.3 PERSONAL HISTORY OF BREAST CANCER: Primary | ICD-10-CM

## 2020-01-08 PROCEDURE — 99212 PR OFFICE/OUTPT VISIT, EST, LEVL II, 10-19 MIN: ICD-10-PCS | Mod: S$PBB,,, | Performed by: PHYSICIAN ASSISTANT

## 2020-01-08 PROCEDURE — 99212 OFFICE O/P EST SF 10 MIN: CPT | Mod: PBBFAC | Performed by: PHYSICIAN ASSISTANT

## 2020-01-08 PROCEDURE — 99999 PR PBB SHADOW E&M-EST. PATIENT-LVL II: CPT | Mod: PBBFAC,,, | Performed by: PHYSICIAN ASSISTANT

## 2020-01-08 PROCEDURE — 99999 PR PBB SHADOW E&M-EST. PATIENT-LVL II: ICD-10-PCS | Mod: PBBFAC,,, | Performed by: PHYSICIAN ASSISTANT

## 2020-01-08 PROCEDURE — 99212 OFFICE O/P EST SF 10 MIN: CPT | Mod: S$PBB,,, | Performed by: PHYSICIAN ASSISTANT

## 2020-01-08 NOTE — PROGRESS NOTES
"Subjective:      Blessing Maddox is a 44 y.o. year old female who [facility] on 01/08/2020 for follow up visit status post Bilateral immediate breast reconstruction with tissue expander placement on 09/12/2019. Here today following completion of XRT, last treatment was Sunday, 01/12/2020. She has severe skin changes on the L breast and axilla secondary to XRT.  Denies fever, chills, nausea, vomiting, or other systemic signs of infection. She was seen and evaluated by myself and Dr. Holden Abernathy      Vitals:    01/08/20 1345   BP: 126/74   Pulse: 94        Review of patient's allergies indicates:   Allergen Reactions    Ciprofloxacin Other (See Comments)     Made pt lose eye sight for seven days    Flagyl [metronidazole] Swelling     THROAT       Current Outpatient Medications on File Prior to Visit   Medication Sig Dispense Refill    atorvastatin (LIPITOR) 20 MG tablet TAKE 1 TABLET(20 MG) BY MOUTH EVERY DAY 90 tablet 3    azithromycin (ZITHROMAX) 1 gram Pack       citalopram (CELEXA) 20 MG tablet Take 1 tablet (20 mg total) by mouth once daily. Start 1/2 tablet (10 mg) x 1 week, then increase as tolerating 30 tablet 5    gabapentin (NEURONTIN) 300 MG capsule TAKE 2 CAPSULES(600 MG) BY MOUTH THREE TIMES DAILY 180 capsule 0    HUMALOG KWIKPEN INSULIN 100 unit/mL pen INJECT 50 UNITS INTO THE SKIN QPM  5    HYDROcodone-acetaminophen (NORCO) 7.5-325 mg per tablet       insulin (LANTUS SOLOSTAR U-100 INSULIN) glargine 100 units/mL (3mL) SubQ pen Inject 60 Units into the skin every evening. 18 mL 11    insulin syringe-needle U-100 0.3 mL 31 gauge x 5/16" Syrg 1 each by Misc.(Non-Drug; Combo Route) route 3 (three) times daily with meals. 100 each 5    lidocaine (LIDODERM) 5 % Place 1 patch onto the skin daily as needed. Remove & Discard patch within 12 hours or as directed by MD as needed for pain. 30 patch 5    liraglutide 0.6 mg/0.1 mL, 18 mg/3 mL, subq PNIJ (VICTOZA " "2-BRITTANY) 0.6 mg/0.1 mL (18 mg/3 mL) PnIj Inject 1.8 mg into the skin once daily. 9 mL 11    lisinopril 10 MG tablet Take 1 tablet (10 mg total) by mouth once daily. 90 tablet 3    metFORMIN (GLUCOPHAGE) 1000 MG tablet Take 1 tablet (1,000 mg total) by mouth 2 (two) times daily with meals. 180 tablet 3    mometasone 0.1% (ELOCON) 0.1 % cream       ondansetron (ZOFRAN-ODT) 8 MG TbDL Take 1 tablet (8 mg total) by mouth every 8 (eight) hours as needed. 90 tablet 3    ONETOUCH DELICA LANCETS 33 gauge Misc Inject 1 lancet into the skin 4 (four) times daily as needed. 200 each 5    ONETOUCH ULTRA BLUE TEST STRIP Strp 1 strip by Misc.(Non-Drug; Combo Route) route 4 (four) times daily as needed. 200 strip 5    ONETOUCH ULTRA2 kit U TO TEST BLOOD SUGAR D  2    oxyCODONE-acetaminophen (PERCOCET) 5-325 mg per tablet Take 1 tablet by mouth every 4 (four) hours as needed for Pain. (Patient not taking: Reported on 12/27/2019) 25 tablet 0    pen needle, diabetic 33 gauge x 5/32" Ndle 1 each by Misc.(Non-Drug; Combo Route) route every evening. 100 each 5    predniSONE (DELTASONE) 20 MG tablet       sulfamethoxazole-trimethoprim 800-160mg (BACTRIM DS) 800-160 mg Tab Take 1 tablet by mouth 2 (two) times daily. 12 tablet 0     Current Facility-Administered Medications on File Prior to Visit   Medication Dose Route Frequency Provider Last Rate Last Dose    diphenhydrAMINE (BENADRYL) 50 mg in sodium chloride 0.9% 50 mL IVPB  50 mg Intravenous 1 time in Clinic/HOD Melvin Chisholm MD        heparin, porcine (PF) 100 unit/mL injection flush 500 Units  500 Units Intravenous PRN Melvin Chisholm MD        heparin, porcine (PF) 100 unit/mL injection flush 500 Units  500 Units Intravenous PRN Melvin Chisholm MD   500 Units at 06/24/19 1017    lactated ringers infusion   Intravenous Continuous Nathalie Vickers MD        lidocaine (PF) 10 mg/ml (1%) injection 10 mg  1 mL Intradermal Once Nathalie Vickers MD        " ondansetron disintegrating tablet 8 mg  8 mg Oral Once PRN Nathalie Vickers MD        PACLitaxel (TAXOL) 80 mg/m2 = 180 mg in sodium chloride 0.9% 280 mL chemo infusion  80 mg/m2 (Treatment Plan Recorded) Intravenous 1 time in Clinic/HOD Melvin Chisholm MD        palonosetron (ALOXI) 0.25 mg, dexamethasone (DECADRON) 10 mg in sodium chloride 0.9% 50 mL IVPB   Intravenous 1 time in Clinic/HOD Melvin Chisholm MD           Patient Active Problem List   Diagnosis    Endometrial cancer    S/P BSO (bilateral salpingo-oophorectomy)    Vasomotor symptoms due to menopause    Depression    COOPER (stress urinary incontinence, female)    Insomnia    Diabetes mellitus    Elevated LFTs    Fatty liver    Abnormal CT scan, gastrointestinal tract    Abnormal CT scan    Endometrial hyperplasia    Malignant neoplasm of lower-outer quadrant of left female breast    Triple negative malignant neoplasm of breast    Malignant neoplasm of lower-outer quadrant of left breast of female, estrogen receptor negative    Port-A-Cath in place    Hypertension    Musculoskeletal pain of right upper extremity    Hypomagnesemia    Neuropathy    Malignant neoplasm of left female breast    C. difficile diarrhea       [unfilled]    Social History     Socioeconomic History    Marital status:      Spouse name: Not on file    Number of children: Not on file    Years of education: Not on file    Highest education level: Not on file   Occupational History    Not on file   Social Needs    Financial resource strain: Not on file    Food insecurity:     Worry: Not on file     Inability: Not on file    Transportation needs:     Medical: Not on file     Non-medical: Not on file   Tobacco Use    Smoking status: Never Smoker    Smokeless tobacco: Never Used   Substance and Sexual Activity    Alcohol use: No     Alcohol/week: 0.0 standard drinks    Drug use: No    Sexual activity: Not on file   Lifestyle    Physical  activity:     Days per week: Not on file     Minutes per session: Not on file    Stress: Not on file   Relationships    Social connections:     Talks on phone: Not on file     Gets together: Not on file     Attends Gnosticist service: Not on file     Active member of club or organization: Not on file     Attends meetings of clubs or organizations: Not on file     Relationship status: Not on file   Other Topics Concern    Not on file   Social History Narrative    Not on file     DATE OF PROCEDURE:  09/12/2019     PREOPERATIVE DIAGNOSIS:  Breast cancer.     POSTOPERATIVE DIAGNOSIS:  Breast cancer.     PROCEDURES PERFORMED:  Immediate bilateral breast reconstruction using tissue   expanders, placement of bilateral Prevena wound VACs.     SURGEON:  Holden Abernathy M.D., FACS     ANESTHESIA:  General.     COMPLICATIONS:  None.     BLOOD LOSS:  Minimal.      Review of Systems: s/p breast reconstruction, no acute issues    Objective:     Physical Exam:  Vitals:    01/08/20 1345   BP: 126/74   Pulse: 94       WD WN NAD  VSS  Normal resp effort  R breast - incision CDI, no erythema or drainage, surgically absent nipple  L breast - incision CDI, no erythema or drainage, surgically absent nipple, + skin changes 2/2 XRT        Assessment:       No diagnosis found.    Plan:   44 y.o. female status post B TE breast recon  - Doing well, no issues  - Will refer to Plastic Surgeon for discussion of autologous breast reconstruction.   - RTC upon completion of XRT. All questions were answered.     The patient was advised to call the clinic with any questions or concerns prior to their next visit.

## 2020-01-10 PROBLEM — C50.919 TRIPLE NEGATIVE MALIGNANT NEOPLASM OF BREAST: Status: ACTIVE | Noted: 2020-01-10

## 2020-01-13 ENCOUNTER — TELEPHONE (OUTPATIENT)
Dept: PHARMACY | Facility: CLINIC | Age: 45
End: 2020-01-13

## 2020-01-13 NOTE — TELEPHONE ENCOUNTER
Informed patient that we received a prescription for Xeloda.  It will require a prior authorization with the patient's insurance.  Once we have received a determination, we will be in touch to discuss copay, initial consult and shipment.  Patient expressed understanding.

## 2020-01-13 NOTE — TELEPHONE ENCOUNTER
Informed Patient  that [facility] received prescription for XELODA 150MG AND 500MG and prior authorization is required.  [facility] will be back in touch once insurance determination is received.

## 2020-01-13 NOTE — TELEPHONE ENCOUNTER
DOCUMENTATION ONLY:  Xeloda 150 mg Tablet #28/21 does not require a prior authorization through the patient's insurance.     Co-pay: $0.00    Patient assistance IS NOT required. Sending to clinical pharmacist for  and shipment. - CAZ      DOCUMENTATION ONLY:   Xeloda 500 mg Tablet #112/21 does not require a prior authorization through the patient's insurance.     Co-pay: $0.00    Patient assistance IS NOT required. Sending to clinical pharmacist for  and shipment. - CAZ

## 2020-01-14 NOTE — TELEPHONE ENCOUNTER
Initial capecitabine consult completed on  . Capecitabine will be shipped on  to arrive at patient's home on 1/15 via [employer]. $ 0.00 copay. Patient plans to start capecitabine on . Address confirmed. Confirmed 2 patient identifiers - name and . Therapy Appropriate.     Patient was counseled on the administration directions:  -Xeloda 150mg: Take 1 tablet (150 mg total) by mouth 2 (two) times daily Take as directed days 1-14 of each 21 day cycle.  - Xeloda 500mg:  Take 4 tablets (2,000 mg total) by mouth 2 (two) times daily Take as directed days 1-14 of each 21 day cycle.   - Take within 30 minutes of a meal.    -Do not chew, crush, or break the tablets.   If possible, patient was instructed tip the tablets from the RX bottle to the cap, and take directly from the cap to the mouth.  Patient may handle the medication with their hands if they wear with a latex or nitrile glove and wash their hands before and after handling the tablets.    Patient was counseled on the following possible side effects, which include, but are not limited to:  swelling, fatigue, weakness/dizziness, hand-foot syndrome, skin irritation, diarrhea, constipation, nausea, vomiting, loss of appetite, mouth sores, hair loss (6%), insomnia, changes in taste, indigestion, increased risk for infection, shortness of breath, and may bleed more easily.  Patient was given Eucerin cream for Hand-Foot Syndrome, and hydrocortisone cream for dermatitis.     DDIs:  Medication list reviewed.     Patient was given 2 patient education handouts on how to handle oral chemotherapy and specific recommendations- do's and don'ts. Instructed the patient that if they have any remaining oral chemotherapy, not to flush down the toilet or throw away in the trash; The patient or caregiver should return the unused oral chemotherapy to either the clinic or to myself in the Pharmacy where the oral chemotherapy can be disposed of properly.     Patient confirmed  understanding. Patient did not have additional questions.   Consultation included: indication; goals of treatment; administration; storage and handling; side effects; how to handle side effects; the importance of compliance; how to handle missed doses; the importance of laboratory monitoring; the importance of keeping all follow up appointments.  Patient understands to report any medication changes to [facility] and provider. All questions answered and addressed to patients satisfaction. I will f/u with patient in 1 week from start, [facility] to contact patient in 3 weeks for refills.

## 2020-01-23 ENCOUNTER — TELEPHONE (OUTPATIENT)
Dept: PHARMACY | Facility: CLINIC | Age: 45
End: 2020-01-23

## 2020-01-23 NOTE — TELEPHONE ENCOUNTER
"Contacted patient for Xeloda 7-day touchbase. Patient confirmed starting Xeloda on Thursday, 1/16/2020.    Dosing, how taking: Patient takes Xeloda 4-500 mg and 1-150 mg BID at 7 am and 7 PM with food. Takes for 14 days on and 7 days off. Denies missed doses.   Storage: Stores medication in bathroom cabinet, which she states it separate from the shower area. Informed patient we typically advise against storing the medication in the bathroom as moisture from showers could affect the medication's ability to work properly. Informed her if she would like to continue storing the medication there to make sure the door is closed any time she uses the shower to protect the medication from moisture.   Handling: Does not handle the medication directly - uses disposable gloves. Aware of handling precautions.  Side effects:   -Nausea: PRN Zofran helps (advised patient to take 30 minutes prior to Xeloda doses to give the medication time to work)  -Diarrhea (ongoing for 1 year - prior to Xeloda therapy): Does not take anything because she goes back and forth between diarrhea and constipation. Patient reports it's tolerable and doesn't limit her in any way.  Recent infections: No signs of infection - no fever, achy chills, or wet persistent cough.  Pain: 8/10 neuropathy in feet, legs, and hands - recently increased Gabapentin dose to 900 mg TID last week (hasn't noticed a difference). Patient to let provider know at upcoming appointment.  Appetite: Able to eat 3 meals/day  Energy, fatigue: Stays home a lot because "I'm just tried a lot" - she attributed this to gabapentin. Noticed herself a lot more tired since recent gabapentin dose increase. Able to complete activities, but struggles. Also, just finished radiation in January (she states she was told the fatigue could last up to 6 weeks following the completion of radiation).  Health, mood, QOL: Reports depression that's well controlled on Celexa. Reports good family and friend " support.  ED/UC visits: No  Next clinical follow up: 4/14/2020  No changes in medications other than the recent gabapentin dose increase. No changes in allergies or health conditions.     Patient has no questions or concerns at this time. She is aware to contact [facility] if she needs anything.

## 2020-01-31 NOTE — TELEPHONE ENCOUNTER
RX call attempt 1 regarding Xeloda 150mg and 500mg refills from [facility]. Patient reached-- shipping out  for  arrival with patients consent. Copay of 0.00 @ 004. Address and  confirmed. Patient has 0 doses on hand at this time of both medications due to being on off cycle. Patient has not started any new medications, has had no missed doses and no side effects present. Patient is currently taking the medication as directed by doctors instruction as follows:    Xeloda 150mg- Take 1 tablet (150 mg total) by mouth 2 (two) times daily Take as directed days 1-14 of each 21 day cycle. Take with water within 30 minutes after a meal. with 1 other capecitabine prescription for 2,150 mg total.  Xeloda 500mg- Take 4 tablets (2,000 mg total) by mouth 2 (two) times daily Take as directed days 1-14 of each 21 day cycle. Take with water within 30 minutes after a meal. with 1 other capecitabine prescription for 2,150 mg total.    Patient does have a safe place in their residence to keep medication at desired temperature away from small children and pets. Patient also does have the capability of contacting 911 in the event of an emergency. Patient states they do not have any questions or concerns at this time. Patients next cycle is set to start on Friday, .

## 2020-02-20 NOTE — TELEPHONE ENCOUNTER
Reached out to Ms. Maddox again concerning her reduced dose. Xeloda 500 mg reduced to 3 tablets BID rather than 4. She confirmed correct dose and says she is doing well.

## 2020-02-20 NOTE — TELEPHONE ENCOUNTER
Refill call regarding Xeloda 150 and 500 at [facility]. Will prepare for shipment with consent of patient on  to arrive  Copay 0.00 Patient has not started any new medications including OTC drugs. Patient has not had any medication/ dose or instruction changes. No new allergies or side effects reported with this shipment. Medication is being taken as prescribed by physician and properly stored. Two patient identifiers:  and Address verified. Cycle resumes on

## 2020-02-26 NOTE — PROGRESS NOTES
Subjective:      Blessing Maddox is a 44 y.o. year old female who presents to the [facility] on 02/26/2020 for follow up visit status post Bilateral immediate breast reconstruction with tissue expander placement on 09/12/2019. Here today following completion of XRT, last treatment was Sunday, 01/12/2020. She is planning to proceed with autologous breast reconstruction and has already been seen in consultation by Dr. Yung Frausto.  Denies fever, chills, nausea, vomiting, or other systemic signs of infection.     Vitals:    02/26/20 1354   BP: 121/80   Pulse: 91        Review of patient's allergies indicates:   Allergen Reactions    Ciprofloxacin Other (See Comments)     Made pt lose eye sight for seven days    Flagyl [metronidazole] Swelling     THROAT       Current Outpatient Medications on File Prior to Visit   Medication Sig Dispense Refill    atorvastatin (LIPITOR) 20 MG tablet TAKE 1 TABLET(20 MG) BY MOUTH EVERY DAY 90 tablet 3    azithromycin (ZITHROMAX) 1 gram Pack       capecitabine (XELODA) 150 MG tablet Take 1 tablet (150 mg total) by mouth 2 (two) times daily Take as directed days 1-14 of each 21 day cycle. Take with water within 30 minutes after a meal. with 1 other capecitabine prescription for 1,650 mg total. 28 tablet 5    capecitabine (XELODA) 500 MG Tab Take 3 tablets (1,500 mg total) by mouth 2 (two) times daily Take as directed days 1-14 of each 21 day cycle. Take with water within 30 minutes after a meal. with 1 other capecitabine prescription for 1,650 mg total. 112 tablet 5    citalopram (CELEXA) 20 MG tablet Take 1.5 tablets (30 mg total) by mouth once daily. Start 1/2 tablet (10 mg) x 1 week, then increase as tolerating 30 tablet 5    gabapentin (NEURONTIN) 300 MG capsule Take 3 capsules (900 mg total) by mouth 3 (three) times daily. 90 capsule 11    HUMALOG KWIKPEN INSULIN 100 unit/mL pen INJECT 50 UNITS INTO THE SKIN QPM  5    insulin (LANTUS SOLOSTAR U-100  "INSULIN) glargine 100 units/mL (3mL) SubQ pen Inject 60 Units into the skin every evening. 18 mL 11    insulin syringe-needle U-100 0.3 mL 31 gauge x 5/16" Syrg 1 each by Misc.(Non-Drug; Combo Route) route 3 (three) times daily with meals. 100 each 5    lidocaine (LIDODERM) 5 % Place 1 patch onto the skin daily as needed. Remove & Discard patch within 12 hours or as directed by MD as needed for pain. 30 patch 5    liraglutide 0.6 mg/0.1 mL, 18 mg/3 mL, subq PNIJ (VICTOZA 2-BRITTANY) 0.6 mg/0.1 mL (18 mg/3 mL) PnIj Inject 1.8 mg into the skin once daily. 9 mL 11    lisinopril 10 MG tablet TAKE 1 TABLET(10 MG) BY MOUTH EVERY DAY 90 tablet 3    metFORMIN (GLUCOPHAGE) 1000 MG tablet TAKE 1 TABLET(1000 MG) BY MOUTH TWICE DAILY WITH MEALS 180 tablet 3    mometasone 0.1% (ELOCON) 0.1 % cream       ONETOUCH DELICA LANCETS 33 gauge Misc Inject 1 lancet into the skin 4 (four) times daily as needed. 200 each 5    ONETOUCH ULTRA BLUE TEST STRIP Strp 1 strip by Misc.(Non-Drug; Combo Route) route 4 (four) times daily as needed. 200 strip 5    ONETOUCH ULTRA2 kit U TO TEST BLOOD SUGAR D  2    oxyCODONE-acetaminophen (PERCOCET) 5-325 mg per tablet Take 1 tablet by mouth every 12 (twelve) hours as needed for Pain. 60 tablet 0    pen needle, diabetic 33 gauge x 5/32" Ndle 1 each by Misc.(Non-Drug; Combo Route) route every evening. 100 each 5    sulfamethoxazole-trimethoprim 800-160mg (BACTRIM DS) 800-160 mg Tab Take 1 tablet by mouth 2 (two) times daily. 12 tablet 0     Current Facility-Administered Medications on File Prior to Visit   Medication Dose Route Frequency Provider Last Rate Last Dose    diphenhydrAMINE (BENADRYL) 50 mg in sodium chloride 0.9% 50 mL IVPB  50 mg Intravenous 1 time in Clinic/HOD Melvin Chisholm MD        heparin, porcine (PF) 100 unit/mL injection flush 500 Units  500 Units Intravenous PRN Melvin Chisholm MD        heparin, porcine (PF) 100 unit/mL injection flush 500 Units  500 Units " Intravenous PRN Melvin Chisholm MD   500 Units at 06/24/19 1017    lactated ringers infusion   Intravenous Continuous Nathalie Vickers MD        lidocaine (PF) 10 mg/ml (1%) injection 10 mg  1 mL Intradermal Once Nathalie Vickers MD        ondansetron disintegrating tablet 8 mg  8 mg Oral Once PRN Nathalie Vickers MD        PACLitaxel (TAXOL) 80 mg/m2 = 180 mg in sodium chloride 0.9% 280 mL chemo infusion  80 mg/m2 (Treatment Plan Recorded) Intravenous 1 time in Clinic/HOD Melvin Chisholm MD        palonosetron (ALOXI) 0.25 mg, dexamethasone (DECADRON) 10 mg in sodium chloride 0.9% 50 mL IVPB   Intravenous 1 time in Clinic/HOD Melvin Chisholm MD           Patient Active Problem List   Diagnosis    Endometrial cancer    S/P BSO (bilateral salpingo-oophorectomy)    Vasomotor symptoms due to menopause    Depression    COOPER (stress urinary incontinence, female)    Insomnia    Diabetes mellitus    Elevated LFTs    Fatty liver    Abnormal CT scan, gastrointestinal tract    Abnormal CT scan    Endometrial hyperplasia    Malignant neoplasm of lower-outer quadrant of left female breast    Triple negative malignant neoplasm of breast    Malignant neoplasm of lower-outer quadrant of left breast of female, estrogen receptor negative    Port-A-Cath in place    Hypertension    Musculoskeletal pain of right upper extremity    Hypomagnesemia    Neuropathy    Malignant neoplasm of left female breast    C. difficile diarrhea    Triple negative malignant neoplasm of breast       [unfilled]    Social History     Socioeconomic History    Marital status:      Spouse name: Not on file    Number of children: Not on file    Years of education: Not on file    Highest education level: Not on file   Occupational History    Not on file   Social Needs    Financial resource strain: Not on file    Food insecurity:     Worry: Not on file     Inability: Not on file    Transportation needs:      Medical: Not on file     Non-medical: Not on file   Tobacco Use    Smoking status: Never Smoker    Smokeless tobacco: Never Used   Substance and Sexual Activity    Alcohol use: No     Alcohol/week: 0.0 standard drinks    Drug use: No    Sexual activity: Not on file   Lifestyle    Physical activity:     Days per week: Not on file     Minutes per session: Not on file    Stress: Not on file   Relationships    Social connections:     Talks on phone: Not on file     Gets together: Not on file     Attends Hinduism service: Not on file     Active member of club or organization: Not on file     Attends meetings of clubs or organizations: Not on file     Relationship status: Not on file   Other Topics Concern    Not on file   Social History Narrative    Not on file     DATE OF PROCEDURE:  09/12/2019     PREOPERATIVE DIAGNOSIS:  Breast cancer.     POSTOPERATIVE DIAGNOSIS:  Breast cancer.     PROCEDURES PERFORMED:  Immediate bilateral breast reconstruction using tissue   expanders, placement of bilateral Prevena wound VACs.     SURGEON:  Holden Abernathy M.D., [facility]     ANESTHESIA:  General.     COMPLICATIONS:  None.     BLOOD LOSS:  Minimal.      Review of Systems: s/p breast reconstruction, no acute issues    Objective:     Physical Exam:  Vitals:    02/26/20 1354   BP: 121/80   Pulse: 91       WD WN NAD  VSS  Normal resp effort  R breast - incision CDI, no erythema or drainage, surgically absent nipple  L breast - incision CDI, no erythema or drainage, surgically absent nipple, + skin changes 2/2 XRT        Assessment:       1. Personal history of breast cancer    2. S/P breast reconstruction        Plan:   44 y.o. female status post B TE breast recon  - Doing well, no issues  - Will RTC once she has had autologous breast reconstruction, which will not be until completion of chemo      The patient was advised to call the clinic with any questions or concerns prior to their next visit.

## 2020-02-27 PROBLEM — Z92.21 S/P CHEMOTHERAPY, TIME SINCE GREATER THAN 12 WEEKS: Status: ACTIVE | Noted: 2020-01-01

## 2020-02-27 PROBLEM — Z90.13 S/P BILATERAL MASTECTOMY: Status: ACTIVE | Noted: 2020-01-01

## 2020-02-27 PROBLEM — Z92.3 S/P RADIATION THERAPY GREATER THAN TWELVE WEEKS AGO: Status: ACTIVE | Noted: 2020-01-01

## 2020-03-12 NOTE — TELEPHONE ENCOUNTER
Refill call regarding Xeloda 150 and 500 at [facility]. Will prepare for shipment with consent of patient on 3/16 to arrive 3/17 Copay 0.00 Patient has not started any new medications including OTC drugs. Patient has not had any medication/ dose or instruction changes. No new allergies or side effects reported with this shipment. Medication is being taken as prescribed by physician and properly stored. Two patient identifiers:  and Address verified. Cycle resumes on 3/19

## 2020-03-26 NOTE — TELEPHONE ENCOUNTER
Spoke to pt; ask to send pics to assess concerns. Pt verbalized understanding will upload pics to portal.

## 2020-04-02 NOTE — TELEPHONE ENCOUNTER
Refill call regarding Xeloda 150 and 500 at [facility]. Will prepare for shipment with consent of patient on  to arrive . Copay 0.00. Patient has not started any new medications including OTC drugs. Patient has not had any medication/ dose or instruction changes. No new allergies or side effects reported with this shipment. Medication is being taken as prescribed by physician and properly stored. Two patient identifiers:  and Address verified. Patient has no questions or concerns for [facility]. Patient states that her cycles will resume on .

## 2020-04-07 NOTE — PROGRESS NOTES
Patient presents Plastic surgery Clinic for evaluation of a red breast.  Patient had a left tissue expander placed in September.  She underwent postoperative radiation treatment.  She was seen over week ago by a local emergency room, where she complained of a hot red breast.  At that time she exhibited no fever no chills no white count.  Her ultrasound of her breast was completely normal without any evidence of any free fluid.  She was placed on Bactrim and Cipro for 1 week and repeat presents with the same symptoms.  She has been on antibiotics for over 3 weeks.  The patient again states that she feels great she has no fever no chills she does not feel ill whatsoever.  Her breast has been the same on antibiotics and off antibiotics.  I do believe that this is secondary to radiation.  We will not place her back on antibiotics.  She has been on them for over 3 weeks.  She will call immediately if her status changes if she develops any fever chills or feels bad in any way.  The redness of the breast fits the radiation pattern almost perfectly.

## 2020-04-10 NOTE — ED NOTES
LOC: The patient is awake and alert; oriented x 3 and speaking appropriately.  APPEARANCE: Patient resting comfortably, patient is clean and well groomed  SKIN: warm and dry, normal skin turgor & moist mucus membranes, skin intact, no breakdown noted. Left breast red, hard , painful.   MUSCULOSKELETAL: Patient moving all extremities well, no obvious swelling or deformities noted  RESPIRATORY: Airway is open and patent,  respirations are spontaneous, normal effort and rate  CARDIAC: Patient has a normal rate, no peripheral edema noted, capillary refill < 3 seconds; No complaints of chest pain , Power martine cath in rt upper chest.   ABDOMEN: Soft and non tender to palpation, no distention noted. Bowel sounds present x 4

## 2020-04-10 NOTE — DISCHARGE INSTRUCTIONS
Follow-up Plastic surgery as advised.  Return the ER if you develop fever/chills or worsening swelling or spreading redness of breast.    Our goal in the emergency department is to always give you outstanding care and exceptional service. You may receive a survey by mail or e-mail in the next week regarding your experience in our ED. We would greatly appreciate your completing and returning the survey. Your feedback provides us with a way to recognize our staff who give very good care and it helps us learn how to improve when your experience was below our aspiration of excellence.

## 2020-04-10 NOTE — ED PROVIDER NOTES
Encounter Date: 4/10/2020       History     Chief Complaint   Patient presents with    Breast Problem     here to see plastics to remove fluid from breast     Ms Maddox is a 44yoF who presents for continued breast pain; pertinent PMHx h/o breast cancer s/p mastectomy with spacers in place.  Patient has been seen in the ED and Plastic surgery Clinic for tenderness and erythema of left breast over the past month.  Previously completed 2 cycles of antibiotics.  Evaluated several days ago in breast surgery clinic, suspected to be more secondary to radiation rather than infection.  According to patient, she was directed by plastic surgery office to report to the ED to have her spacer drained, as her providers suspected this may improve her condition.  She was supposed to restart chemotherapy yesterday, though this was put on hold until breast condition improves.  She denies fever/chills, nausea/vomiting, spreading erythema.  The patients available PMH, PSH, Social History, medications, allergies, and triage vital signs were reviewed just prior to their medical evaluation.    Please be advised this text was dictated with [email or web address] software and may contain errors due to translation.           Review of patient's allergies indicates:   Allergen Reactions    Ciprofloxacin Other (See Comments)     Made pt lose eye sight for seven days    Flagyl [metronidazole] Swelling     THROAT     Past Medical History:   Diagnosis Date    Breast cancer     Diabetes mellitus     DUB (dysfunctional uterine bleeding)     Endometrial hyperplasia     Fatty liver     HLD (hyperlipidemia)     Hypertension     Low back pain     TIA (transient ischemic attack)     Triple negative malignant neoplasm of breast 1/10/2020    Uterine cancer      Past Surgical History:   Procedure Laterality Date    AXILLARY NODE DISSECTION Left 9/12/2019    Procedure: LYMPHADENECTOMY, AXILLARY;  Surgeon: Mary Soriano MD;  Location: [facility] OR [address] [place] [address];   Service: General;  Laterality: Left;    BILATERAL SALPINGOOPHORECTOMY  2015    Pelvic lymph node dissection    BREAST BIOPSY Left 2019    us guided     SECTION      x5    CHOLECYSTECTOMY      COLONOSCOPY N/A 2017    Procedure: COLONOSCOPY;  Surgeon: Jerome Amos MD;  Location: [facility];  Service: Endoscopy;  Laterality: N/A;    HYSTERECTOMY  6-4-15    hysteroscopy, D&C  4-23-15    INJECTION FOR SENTINEL NODE IDENTIFICATION Left 2019    Procedure: INJECTION, FOR SENTINEL NODE IDENTIFICATION;  Surgeon: Mary Soriano MD;  Location: [facility] OR [address] [place] [address];  Service: General;  Laterality: Left;    INSERTION OF BREAST TISSUE EXPANDER Bilateral 2019    Procedure: INSERTION, TISSUE EXPANDER, BREAST;  Surgeon: Holden Abernathy MD;  Location: [address];  Service: Plastics;  Laterality: Bilateral;    INSERTION OF TUNNELED CENTRAL VENOUS CATHETER (CVC) WITH SUBCUTANEOUS PORT Right 2/15/2019    Procedure: [id number];  Surgeon: Grupo Sheriff MD;  Location: [facility];  Service: General;  Laterality: Right;  right subclavian    MASTECTOMY Bilateral 2019    Procedure: MASTECTOMY-skin sparing;  Surgeon: Mary Soriano MD;  Location: [facility] OR [address] [place] [address];  Service: General;  Laterality: Bilateral;    SENTINEL LYMPH NODE BIOPSY Left 2019    Procedure: BIOPSY, LYMPH NODE, SENTINEL;  Surgeon: Mary Soriano MD;  Location: [facility] OR [address] [place] [address];  Service: General;  Laterality: Left;     Family History   Problem Relation Age of Onset    COPD Mother     Hypertension Mother     Stroke Mother         x3    Sleep apnea Mother     Heart disease Maternal Grandmother     Hypertension Maternal Grandmother     Ovarian cancer Maternal Aunt     Uterine cancer Maternal Aunt     Colon cancer Neg Hx     Esophageal cancer Neg Hx     Rectal cancer Neg Hx     Stomach cancer Neg Hx      Social History     Tobacco Use    Smoking status: Never Smoker    Smokeless tobacco:  "Never Used   Substance Use Topics    Alcohol use: No     Alcohol/week: 0.0 standard drinks    Drug use: No     Review of Systems   Constitutional: Negative for chills and fever.   Gastrointestinal: Negative for nausea and vomiting.   Musculoskeletal: Negative for arthralgias and myalgias.   Skin: Positive for color change. Negative for rash and wound.       Physical Exam     Initial Vitals [04/10/20 0810]   BP Pulse Resp Temp SpO2   131/83 92 18 98.3 °F (36.8 °C) 97 %      MAP       --         Physical Exam    Vitals reviewed.  Constitutional: She appears well-developed and well-nourished. She is not diaphoretic. No distress.   Eyes: Conjunctivae and EOM are normal.   Pulmonary/Chest:       S/p B/L mastectomy with spacers in place.  Blanching deep red erythema that is warm and tender.  Left breast is firm compared to right.  No overlying abrasions or drainage.  No lymphangitis.  Does not cross midline.     Musculoskeletal:   Left pectoralis muscle with full ROM against resistance without pain   Neurological: She is alert and oriented to person, place, and time. No cranial nerve deficit.   Skin: There is erythema. No pallor.   Psychiatric: She has a normal mood and affect. Her behavior is normal. Judgment and thought content normal.         ED Course   Procedures  Labs Reviewed - No data to display       Imaging Results    None          Medical Decision Making:   History:   Old Medical Records: I decided to obtain old medical records.  Old Records Summarized: records from clinic visits.  Initial Assessment:   Patient with tissue expander s/p mastectomy returns for continued redness/erythema of left breast, suspect secondary to radiation via Plastic surgery.  Reports she was directed here to have expander "drained".  No current chemo, VSS, afebrile, no systemic symptoms  Differential Diagnosis:   DDx includes radiation side effect vs infection. Physical exam and history taking lower clinical suspicion for sepsis, " abscess, shock.  ED Management:  Discussed with Plastic PA Anastacia Tucker, states she will come in a see the patient (likely miscommunication, as she instructed office staff to inform patient to come in for drainage Monday morning at clinic). Patient made aware, denies need for pain control at this time.    Update:  Plastic surgery has decreased size of spacer at bedside, give follow-up for clinic this week. They sent antibiotic Rx to pharmacy, though still strongly suspect secondary to radiation. Patient agreed to plan of care and voiced understanding. Discharged in stable condition with strict ED return precautions.    Anjali Gregory PA-C  04/10/2020    Other:   I have discussed this case with another health care provider.                                 Clinical Impression:       ICD-10-CM ICD-9-CM   1. Breast pain N64.4 611.71   2. Adverse effect of radiation, subsequent encounter T66.XXXD V58.89     990   3. History of breast cancer Z85.3 V10.3         Disposition:   Disposition: Discharged  Condition: Stable     ED Disposition Condition    Discharge Stable        ED Prescriptions     Medication Sig Dispense Start Date End Date Auth. Provider    sulfamethoxazole-trimethoprim 800-160mg (BACTRIM DS) 800-160 mg Tab Take 1 tablet by mouth 2 (two) times daily. for 10 days 14 tablet 4/10/2020 4/20/2020 CHUN Castaneda        Follow-up Information     Follow up With Specialties Details Why Contact Info    [facility] Emergency Medicine Go to  Return to the ER immediately, If symptoms worsen or new symptoms occur [address] [facility] [id number] [phone]                                     Anjali Gregory PA-C  04/10/20 1058

## 2020-04-10 NOTE — ED TRIAGE NOTES
Onset 25 days ago pain , swelling and redness to left breast . Referred here for plastic surgery consult. Denies fever. Last radiation was 1/5/2020. On oral chemo.

## 2020-04-10 NOTE — CONSULTS
Consult Note  Plastic Surgery    Consult Requested By: Emergency Department  Reason for Consult: Erythema of LEFT breast status breast reconstruction    SUBJECTIVE:     History of Present Illness:  Patient is a 44 y.o. female presents to the ED with chief complaint of erythema of the left breast. Patient underwent immediate breast reconstruction with bridge TE placement on 2019. She subsequently underwent chemo and radiation. She has been suffering from intermittent pain and erythema of the left breast since completion of XRT 2020. She has been on multiple rounds of PO abx - Clindamycin, Keflex. She is currently not on any oral antibiotics. She denies any systemic signs of infection. Denies fever, chills, nausea, vomiting, chest pain, SOB, abdominal pain or headache.       PRN Meds:    Review of patient's allergies indicates:   Allergen Reactions    Ciprofloxacin Other (See Comments)     Made pt lose eye sight for seven days    Flagyl [metronidazole] Swelling     THROAT       Past Medical History:   Diagnosis Date    Breast cancer     Diabetes mellitus     DUB (dysfunctional uterine bleeding)     Endometrial hyperplasia     Fatty liver     HLD (hyperlipidemia)     Hypertension     Low back pain     TIA (transient ischemic attack)     Triple negative malignant neoplasm of breast 1/10/2020    Uterine cancer      Past Surgical History:   Procedure Laterality Date    AXILLARY NODE DISSECTION Left 2019    Procedure: LYMPHADENECTOMY, AXILLARY;  Surgeon: Mary Soriano MD;  Location: [address];  Service: General;  Laterality: Left;    BILATERAL SALPINGOOPHORECTOMY  2015    Pelvic lymph node dissection    BREAST BIOPSY Left 2019    us guided     SECTION      x5    CHOLECYSTECTOMY      COLONOSCOPY N/A 2017    Procedure: COLONOSCOPY;  Surgeon: Jerome Amos MD;  Location: [facility];  Service: Endoscopy;  Laterality: N/A;    HYSTERECTOMY  6-4-15     hysteroscopy, D&C  4-23-15    INJECTION FOR SENTINEL NODE IDENTIFICATION Left 9/12/2019    Procedure: INJECTION, FOR SENTINEL NODE IDENTIFICATION;  Surgeon: Mary Soriano MD;  Location: [address];  Service: General;  Laterality: Left;    INSERTION OF BREAST TISSUE EXPANDER Bilateral 9/12/2019    Procedure: INSERTION, TISSUE EXPANDER, BREAST;  Surgeon: Holden Abernathy MD;  Location: [address];  Service: Plastics;  Laterality: Bilateral;    INSERTION OF TUNNELED CENTRAL VENOUS CATHETER (CVC) WITH SUBCUTANEOUS PORT Right 2/15/2019    Procedure: [id number];  Surgeon: Grupo Sheriff MD;  Location: [facility];  Service: General;  Laterality: Right;  right subclavian    MASTECTOMY Bilateral 9/12/2019    Procedure: MASTECTOMY-skin sparing;  Surgeon: Mary Soriano MD;  Location: [address];  Service: General;  Laterality: Bilateral;    SENTINEL LYMPH NODE BIOPSY Left 9/12/2019    Procedure: BIOPSY, LYMPH NODE, SENTINEL;  Surgeon: Mary Soriano MD;  Location: [facility] OR [address] [place] [address];  Service: General;  Laterality: Left;     Family History   Problem Relation Age of Onset    COPD Mother     Hypertension Mother     Stroke Mother         x3    Sleep apnea Mother     Heart disease Maternal Grandmother     Hypertension Maternal Grandmother     Ovarian cancer Maternal Aunt     Uterine cancer Maternal Aunt     Colon cancer Neg Hx     Esophageal cancer Neg Hx     Rectal cancer Neg Hx     Stomach cancer Neg Hx      Social History     Tobacco Use    Smoking status: Never Smoker    Smokeless tobacco: Never Used   Substance Use Topics    Alcohol use: No     Alcohol/week: 0.0 standard drinks    Drug use: No        Review of Systems:    Review of Systems - History obtained from the patient  General ROS: positive for  - chills, fatigue, fever, malaise and sleep disturbance  Breast ROS: positive for - erythema of LEFT BREAST  Musculoskeletal ROS: negative for - joint pain, joint stiffness,  muscle pain or muscular weakness        OBJECTIVE:     Vital Signs (Most Recent)  Temp: 98.3 °F (36.8 °C) (04/10/20 0810)  Pulse: 92 (04/10/20 0810)  Resp: 18 (04/10/20 0810)  BP: 131/83 (04/10/20 0810)  SpO2: 97 % (04/10/20 0810)    Vital Signs Range (Last 24H):  Temp:  [98.3 °F (36.8 °C)]   Pulse:  [92]   Resp:  [18]   BP: (131)/(83)   SpO2:  [97 %]     Physical Exam:  WD WN NAD  Afebrile, VSS  L breast - incision well healed, + erythema and capsular contracture secondary to XRT, mild TTP, no fluctuance      Diagnostic Results:  US: Reviewed   03/30/2020  There is edema seen in the left breast. An implant spacer is noted. A small amount of scattered edema is noted in the soft tissues superficial to the left breast implant spacer. No focal drainable fluid collections are detected by ultrasound at this time.     ASSESSMENT/PLAN:     44 y.o. F with erythema of left breast s/p TE breast reconstruction and Chemo/XRT likely secondary to radiation vs. Cellulitis.    1. 120ccNS removed from L breast TE for symptomatic relief of pain secondary to capsular contracture. Patient reported immediate relief of pain with removal of saline.   2. Will prophylactically treat with PO Bactrim DS for cellulitis although erythema likely secondary to XRT.. Would like to avoid Clindamycin given recent h/o C diff.   3. Will arrange TeleHealth follow up with  or myself on Monday, 04/13/2020  4. Patient advised to call the PLS Fellow over the weekend at [phone] with any questions or concerns. Encourage to avoid the ED given immunocomprised state and current COVID-19 pandemic. Dr. Jimbo Angela, PLS fellow, at bedside for entire visit today.     All questions were answered. Patient voiced understanding.

## 2020-04-17 NOTE — TELEPHONE ENCOUNTER
I have reviewed the notes and assessments performed by Isidra Gregory, I concur with her documentation of Blessing Maddox. I,Marielle Worthy, did not personally perform any of the services outlined in this documentation.

## 2020-04-21 PROBLEM — N64.4 BREAST PAIN: Status: ACTIVE | Noted: 2020-01-01

## 2020-04-23 NOTE — TELEPHONE ENCOUNTER
Contacted patient in regards to Xeloda refill and f/u. She confirmed the medication was placed on hold and was restarted on 4/20 - she has enough on hand to complete this cycle. Refill not needed until 5/11. She would like to complete clinical f/u at that time.

## 2020-05-13 NOTE — TELEPHONE ENCOUNTER
Contacted the patient to discuss Xeloda dose increase from 1650mg BID to 1800mg BID. She is aware of dose increase - she confirmed starting her cycle on 5/11 and will need one more week of 150mg tabs by 5/18. Will ship #28 tabs/14DS on 5/14 for her to receive 5/15. $0 copay, address confirmed.

## 2020-05-14 NOTE — TELEPHONE ENCOUNTER
Pt informed of bactrim called in to pharmacy. Pt instructed per  to take Rx as follows BID for 7 days. No refills. Call office if any other questions or concerns.

## 2020-05-26 NOTE — TELEPHONE ENCOUNTER
Xeloda 150mg needs new PA. Shipping on hold. Patient confirmed that she will restart her next cycle on 6/1. Will reach out to her once PA is approved to set up shipment. Patient is aware and will reach out to [facility] for any questions or concerns.

## 2020-05-27 NOTE — TELEPHONE ENCOUNTER
PA approved on 5/27 for Xeloda 150mg dose. Reached out to patient and confirmed shipment for both 500mg and 150mg dose. Address confirmed. $0.00. She will start her next cycle on 6/1

## 2020-05-29 NOTE — TELEPHONE ENCOUNTER
Spoke with pt and she stated that if possible she would like to be seen today , I told pt that was no problem ,I would have to text provider & get her a time . After continuin to speak to the pt , se decided that she could wait until his next available clinic on Wednesday 6/3 . I asked pt to still send us pics to the portal of the concerning areas so Dr Abernathy could see them. Pt verbalized understanding.               ----- Message from Nicki Kendall sent at 5/29/2020 10:38 AM CDT -----  pt is calling stating that she would like to be seen today cause she has three lumps and red spot on her right breast and would like for the nurse to give her a call back

## 2020-06-03 NOTE — PROGRESS NOTES
"Subjective:      Blessing Maddox is a 44 y.o. year old female who presents to the [facility] on 06/03/2020 for follow up visit status post Bilateral immediate breast reconstruction with tissue expander placement on 09/12/2019. Here today for left breast check s/p completion of XRT. She is planning to proceed with autologous breast reconstruction and is scheduled to see by Dr. Yung Frausto on 06/11/2020.  Denies fever, chills, nausea, vomiting, or other systemic signs of infection.     Vitals:    06/03/20 0930   BP: 115/82   Pulse: 89        Review of patient's allergies indicates:   Allergen Reactions    Ciprofloxacin Other (See Comments)     Made pt lose eye sight for seven days    Flagyl [metronidazole] Swelling     THROAT       Current Outpatient Medications on File Prior to Visit   Medication Sig Dispense Refill    atorvastatin (LIPITOR) 20 MG tablet TAKE 1 TABLET(20 MG) BY MOUTH EVERY DAY 90 tablet 3    BD INSULIN SYRINGE ULTRA-FINE 0.3 mL 31 gauge x 5/16" Syrg USE AS DIRECTED THREE TIMES DAILY WITH MEALS 100 each 11    BD ULTRA-FINE SHINE PEN NEEDLE 32 gauge x 5/32" Ndle USE AS DIRECTED EVERY EVENING 90 each 3    capecitabine (XELODA) 150 MG tablet Take 2 tablets (300 mg total) by mouth 2 (two) times daily. Take on days 1-14 of a 21-day cycle. 56 tablet 5    capecitabine (XELODA) 500 MG Tab Take 3 tablets (1,500 mg total) by mouth 2 (two) times daily Take as directed days 1-14 of each 21 day cycle. Take with water within 30 minutes after a meal. with 1 other capecitabine prescription for 1,650 mg total. 112 tablet 5    citalopram (CELEXA) 20 MG tablet Take 1.5 tablets (30 mg total) by mouth once daily. Start 1/2 tablet (10 mg) x 1 week, then increase as tolerating 30 tablet 5    gabapentin (NEURONTIN) 300 MG capsule Take 3 capsules (900 mg total) by mouth 3 (three) times daily. 90 capsule 11    HUMALOG KWIKPEN INSULIN 100 unit/mL pen INJECT 50 UNITS INTO THE SKIN QPM  5    " insulin (LANTUS SOLOSTAR U-100 INSULIN) glargine 100 units/mL (3mL) SubQ pen Inject 60 Units into the skin every evening. 18 mL 11    lidocaine (LIDODERM) 5 % Place 1 patch onto the skin daily as needed. Remove & Discard patch within 12 hours or as directed by MD as needed for pain. 30 patch 5    liraglutide 0.6 mg/0.1 mL, 18 mg/3 mL, subq PNIJ (VICTOZA 2-BRITTANY) 0.6 mg/0.1 mL (18 mg/3 mL) PnIj Inject 1.8 mg into the skin once daily. 9 mL 11    lisinopril 10 MG tablet TAKE 1 TABLET(10 MG) BY MOUTH EVERY DAY 90 tablet 3    metFORMIN (GLUCOPHAGE) 1000 MG tablet TAKE 1 TABLET(1000 MG) BY MOUTH TWICE DAILY WITH MEALS 180 tablet 3    mometasone 0.1% (ELOCON) 0.1 % cream       ondansetron (ZOFRAN-ODT) 8 MG TbDL DISSOLVE 1 TABLET(8 MG) ON THE TONGUE EVERY 8 HOURS AS NEEDED 90 tablet 3    ONETOUCH DELICA LANCETS 33 gauge Misc Inject 1 lancet into the skin 4 (four) times daily as needed. 200 each 5    ONETOUCH ULTRA BLUE TEST STRIP Strp USE FOUR TIMES DAILY AS NEEDED 200 strip 0    ONETOUCH ULTRA2 kit U TO TEST BLOOD SUGAR D  2    oxyCODONE-acetaminophen (PERCOCET) 7.5-325 mg per tablet Take 1 tablet by mouth every 8 (eight) hours as needed for Pain. Take sparingly 90 tablet 0    triamcinolone acetonide 0.1% (KENALOG) 0.1 % cream Apply topically 3 (three) times daily. For itching due to poison plant rash. Avoid face. 45 g 0     Current Facility-Administered Medications on File Prior to Visit   Medication Dose Route Frequency Provider Last Rate Last Dose    diphenhydrAMINE (BENADRYL) 50 mg in sodium chloride 0.9% 50 mL IVPB  50 mg Intravenous 1 time in Clinic/HOD Melvin Chisholm MD        heparin, porcine (PF) 100 unit/mL injection flush 500 Units  500 Units Intravenous PRN Melvin Chisholm MD        heparin, porcine (PF) 100 unit/mL injection flush 500 Units  500 Units Intravenous PRN Melvin Chisholm MD   500 Units at 06/24/19 1017    lactated ringers infusion   Intravenous Continuous Nathalie Vickers,  MD        lidocaine (PF) 10 mg/ml (1%) injection 10 mg  1 mL Intradermal Once Nathalie Vickers MD        ondansetron disintegrating tablet 8 mg  8 mg Oral Once PRN Nathalie Vickers MD        PACLitaxel (TAXOL) 80 mg/m2 = 180 mg in sodium chloride 0.9% 280 mL chemo infusion  80 mg/m2 (Treatment Plan Recorded) Intravenous 1 time in Clinic/HOD Melvin Chisholm MD        palonosetron (ALOXI) 0.25 mg, dexamethasone (DECADRON) 10 mg in sodium chloride 0.9% 50 mL IVPB   Intravenous 1 time in Clinic/HOD Melvin Chisholm MD           Patient Active Problem List   Diagnosis    Endometrial cancer    S/P BSO (bilateral salpingo-oophorectomy)    Vasomotor symptoms due to menopause    Depression    COOPER (stress urinary incontinence, female)    Insomnia    Diabetes mellitus    Elevated LFTs    Fatty liver    Abnormal CT scan, gastrointestinal tract    Abnormal CT scan    Endometrial hyperplasia    Malignant neoplasm of lower-outer quadrant of left female breast    Triple negative malignant neoplasm of breast    Malignant neoplasm of lower-outer quadrant of left breast of female, estrogen receptor negative    Port-A-Cath in place    Hypertension    Musculoskeletal pain of right upper extremity    Hypomagnesemia    Neuropathy    Malignant neoplasm of left female breast    C. difficile diarrhea    Triple negative malignant neoplasm of breast    S/P chemotherapy, time since greater than 12 weeks    S/P bilateral mastectomy    S/P radiation therapy greater than twelve weeks ago    Breast pain       [unfilled]    Social History     Socioeconomic History    Marital status:      Spouse name: Not on file    Number of children: Not on file    Years of education: Not on file    Highest education level: Not on file   Occupational History    Not on file   Social Needs    Financial resource strain: Not on file    Food insecurity:     Worry: Not on file     Inability: Not on file     Transportation needs:     Medical: Not on file     Non-medical: Not on file   Tobacco Use    Smoking status: Never Smoker    Smokeless tobacco: Never Used   Substance and Sexual Activity    Alcohol use: No     Alcohol/week: 0.0 standard drinks    Drug use: No    Sexual activity: Not on file   Lifestyle    Physical activity:     Days per week: Not on file     Minutes per session: Not on file    Stress: Not on file   Relationships    Social connections:     Talks on phone: Not on file     Gets together: Not on file     Attends Je[employer]'s witness service: Not on file     Active member of club or organization: Not on file     Attends meetings of clubs or organizations: Not on file     Relationship status: Not on file   Other Topics Concern    Not on file   Social History Narrative    Not on file     DATE OF PROCEDURE:  09/12/2019     PREOPERATIVE DIAGNOSIS:  Breast cancer.     POSTOPERATIVE DIAGNOSIS:  Breast cancer.     PROCEDURES PERFORMED:  Immediate bilateral breast reconstruction using tissue   expanders, placement of bilateral Prevena wound VACs.     SURGEON:  Holden Abernathy M.D., FACS     ANESTHESIA:  General.     COMPLICATIONS:  None.     BLOOD LOSS:  Minimal.      Review of Systems: s/p breast reconstruction, no acute issues    Objective:     Physical Exam:  Vitals:    06/03/20 0930   BP: 115/82   Pulse: 89       WD WN NAD  VSS  Normal resp effort  R breast - incision CDI, no erythema or drainage, surgically absent nipple  L breast - incision CDI, no erythema or drainage, surgically absent nipple, + skin changes 2/2 XRT        Assessment:       1. Surgery follow-up examination        Plan:   44 y.o. female status post B TE breast recon  - Doing well, no issues  - Skin changes secondary to XRT, no sign of infection  - Will RTC once she has had autologous breast reconstruction, which will not be until completion of chemo      The patient was advised to call the clinic with any questions or concerns prior  to their next visit.

## 2020-06-15 NOTE — TELEPHONE ENCOUNTER
Refill call regarding Xeloda 150 and 500 at [facility]. Will prepare for shipment with consent of patient on  to arrive . Copay 0.00. Patient has not started any new medications including OTC drugs. Patient has not had any medication/ dose or instruction changes. No new allergies or side effects reported with this shipment. Medication is being taken as prescribed by physician and properly stored. Two patient identifiers:  and Address verified. Patient has no questions or concerns for RP. Cycles will resume on .

## 2020-07-02 NOTE — TELEPHONE ENCOUNTER
Patient confirmed that she has completed her Xeloda therapy. Will not have any remaining tablets. Aware to contact [facility] for any questions or concerns.

## 2020-08-26 NOTE — ANESTHESIA PREPROCEDURE EVALUATION
08/26/2020  Blessing Maddox is a 45 y.o., female.    Anesthesia Evaluation    I have reviewed the Patient Summary Reports.    I have reviewed the Nursing Notes. I have reviewed the NPO Status.   I have reviewed the Medications.     Review of Systems  Anesthesia Hx:  No problems with previous Anesthesia  History of prior surgery of interest to airway management or planning: Previous anesthesia: General Sept 2019 [facility] [place] mast w tissuw expanders with general anesthesia.  Procedure performed at an [facility]. Denies Family Hx of Anesthesia complications.   Denies Personal Hx of Anesthesia complications.   Social:  Non-Smoker    Hematology/Oncology:  Hematology Normal      Current/Recent Cancer. Breast left axillary node dissection chemotherapy, radiation and surgery   EENT/Dental:EENT/Dental Normal   Cardiovascular:   Hypertension, well controlled    Pulmonary:  Pulmonary Normal    Renal/:  Renal/ Normal     Hepatic/GI:  Hepatic/GI Normal    Musculoskeletal:  Musculoskeletal Normal    Neurological:   TIA, 2013 TIA stress related   Endocrine:   Diabetes, using insulin    Psych:   Psychiatric History          Physical Exam  General:  Morbid Obesity    Airway/Jaw/Neck:  Airway Findings: Mouth Opening: Normal Tongue: Normal  General Airway Assessment: Adult  Mallampati: II      Dental:  Dental Findings: In tact        Mental Status:  Mental Status Findings:  Cooperative, Alert and Oriented         Anesthesia Plan  Type of Anesthesia, risks & benefits discussed:  Anesthesia Type:  general  Patient's Preference:   Intra-op Monitoring Plan: standard ASA monitors  Intra-op Monitoring Plan Comments:   Post Op Pain Control Plan: per primary service following discharge from PACU and multimodal analgesia  Post Op Pain Control Plan Comments:   Induction:   IV  Beta Blocker:         Informed Consent:  Patient understands risks and agrees with Anesthesia plan.  Questions answered. Anesthesia consent signed with patient.  ASA Score: 3     Day of Surgery Review of History & Physical:    H&P update referred to the surgeon.     Anesthesia Plan Notes: Labs in epic ok,EKG today devices on rt side        Ready For Surgery From Anesthesia Perspective.

## 2020-08-26 NOTE — DISCHARGE INSTRUCTIONS
Information to Prepare you for your Surgery    PRE-ADMIT TESTING -  [phone]    [address]  [place] [facility]          Your surgery has been scheduled at [facility]. We are pleased to have the opportunity to serve you. For Further Information please call [phone].    On the day of surgery please report to the Information Desk on the 1st floor.    · CONTACT YOUR PHYSICIAN'S OFFICE THE DAY PRIOR TO YOUR SURGERY TO OBTAIN YOUR ARRIVAL TIME.     · The evening before surgery do not eat anything after 9 p.m. ( this includes hard candy, chewing gum and mints).  You may only have GATORADE, POWERADE AND WATER  from 9 p.m. until you leave your home.   DO NOT DRINK ANY LIQUIDS ON THE WAY TO THE HOSPITAL.      SPECIAL MEDICATION INSTRUCTIONS: TAKE medications checked off by the Anesthesiologist on your Medication List.    Angiogram Patients: Take medications as instructed by your physician, including aspirin.     Surgery Patients:    If you take ASPIRIN - Your PHYSICIAN/SURGEON will need to inform you IF/OR when you need to stop taking aspirin prior to your surgery.     Do Not take any medications containing IBUPROFEN.  Do Not Wear any make-up or dark nail polish   (especially eye make-up) to surgery. If you come to surgery with makeup on you will be required to remove the makeup or nail polish.    Do not shave your surgical area at least 5 days prior to your surgery. The surgical prep will be performed at the hospital according to Infection Control regulations.    Leave all valuables at home.   Do Not wear any jewelry or watches, including any metal in body piercings. Jewelry must be removed prior to coming to the hospital.  There is a possibility that rings that are unable to be removed may be cut off if they are on the surgical extremity.    Contact Lens must be removed before surgery. Either do not wear the contact lens or bring a case and solution for  storage.  Please bring a container for eyeglasses or dentures as required.  Bring any paperwork your physician has provided, such as consent forms,  history and physicals, doctor's orders, etc.   Bring comfortable clothes that are loose fitting to wear upon discharge. Take into consideration the type of surgery being performed.  Maintain your diet as advised per your physician the day prior to surgery.      Adequate rest the night before surgery is advised.   Park in the Parking lot behind the hospital or in the [place] [facility] across the street from the parking lot. Parking is complimentary.  If you will be discharged the same day as your procedure, please arrange for a responsible adult to drive you home or to accompany you if traveling by taxi.   YOU WILL NOT BE PERMITTED TO DRIVE OR TO LEAVE THE HOSPITAL ALONE AFTER SURGERY.   If you are being discharged the same day, it is strongly recommended that you arrange for someone to remain with you for the first 24 hrs following your surgery.    The Surgeon will speak to your family/visitor after your surgery regarding the outcome of your surgery and post op care.  The Surgeon may speak to you after your surgery, but there is a possibility you may not remember the details.  Please check with your family members regarding the conversation with the Surgeon.    We strongly recommend whoever is bringing you home be present for discharge instructions.  This will ensure a thorough understanding for your post op home care.    ALL CHILDREN MUST ALWAYS BE ACCOMPANIED BY AN ADULT.    Visitors-Refer to current Visitor policy handouts.    Thank you for your cooperation.  The Staff of [facility].                Bathing Instructions with Hibiclens     Shower the evening before and morning of your procedure with Hibiclens:   Wash your face with water and your regular face wash/soap   Apply Hibiclens directly on your skin or on a wet washcloth and wash  gently. When showering: Move away from the shower stream when applying Hibiclens to avoid rinsing off too soon.   Rinse thoroughly with warm water   Do not dilute Hibiclens         Dry off as usual, do not use any deodorant, powder, body lotions, perfume, after shave or cologne.

## 2020-09-02 PROBLEM — Z85.3 PERSONAL HISTORY OF MALIGNANT NEOPLASM OF BREAST: Status: ACTIVE | Noted: 2020-01-01

## 2020-09-02 NOTE — OR NURSING
Dr. Au at BS. Pt morgan flaps with strong internal doppler sounds. MD aware of pale coloring. Pt c/o pain in right shoulder relieved with PRN pain medication as ordered. See MAR. DARREL drains x 3 patent and draining (see I/O charting). Caputo in place.   Pt AAOx4, VSS. Spouse updated and at BS during transfer to MS.

## 2020-09-02 NOTE — TRANSFER OF CARE
"Anesthesia Transfer of Care Note    Patient: Blessing Maddox    Procedure(s) Performed: Procedure(s) (LRB):  REMOVAL, TISSUE EXPANDER (Bilateral)  RECONSTRUCTION, BREAST, USING ASHLEY FREE FLAP (Bilateral)  REMOVAL, VASCULAR ACCESS PORT    Patient location: PACU    Anesthesia Type: general    Transport from OR: Transported from OR on 2-3 L/min O2 by NC with adequate spontaneous ventilation    Post pain: adequate analgesia    Post assessment: no apparent anesthetic complications    Post vital signs: stable    Level of consciousness: awake    Nausea/Vomiting: no nausea/vomiting    Complications: none    Transfer of care protocol was followed      Last vitals:   Visit Vitals  /71 (BP Location: Right arm, Patient Position: Lying)   Pulse (!) 116   Temp 37.6 °C (99.7 °F) (Oral)   Resp 18   Ht 5' 2" (1.575 m)   Wt 106.6 kg (235 lb)   LMP 06/04/2015 (Exact Date)   SpO2 97%   Breastfeeding No   BMI 42.98 kg/m²     "

## 2020-09-02 NOTE — OR NURSING
Breast weights:    Tissue expanders removed weights:  Right tissue expander 798gm  Left tissue expander 440gm    Flap weights:  Right abdomen flap to right breast: 942gm  Left abdomen flap to left breast: 1386gm

## 2020-09-02 NOTE — ANESTHESIA POSTPROCEDURE EVALUATION
Anesthesia Post Evaluation    Patient: Blessing Maddox    Procedure(s) Performed: Procedure(s) (LRB):  REMOVAL, TISSUE EXPANDER (Bilateral)  RECONSTRUCTION, BREAST, USING ASHLEY FREE FLAP (Bilateral)  REMOVAL, VASCULAR ACCESS PORT    Final Anesthesia Type: general    Patient location during evaluation: PACU  Patient participation: Yes- Able to Participate  Level of consciousness: awake and alert  Post-procedure vital signs: reviewed and stable  Pain management: adequate  Airway patency: patent    PONV status at discharge: No PONV  Anesthetic complications: no      Cardiovascular status: blood pressure returned to baseline  Respiratory status: unassisted and room air  Hydration status: euvolemic  Follow-up not needed.          Vitals Value Taken Time   /69 09/02/20 1646   Temp 37.6 °C (99.7 °F) 09/02/20 1444   Pulse 105 09/02/20 1646   Resp 16 09/02/20 1630   SpO2 97 % 09/02/20 1646   Vitals shown include unvalidated device data.      No case tracking events are documented in the log.      Pain/Michael Score: Pain Rating Prior to Med Admin: 7 (9/2/2020  3:30 PM)  Pain Rating Post Med Admin: 7 (9/2/2020  3:30 PM)  Michael Score: 9 (9/2/2020  2:50 PM)

## 2020-09-02 NOTE — INTERVAL H&P NOTE
The patient has been examined and the H&P has been reviewed:    I concur with the findings and no changes have occurred since H&P was written.    Surgery risks, benefits and alternative options discussed and understood by patient/family.          Active Hospital Problems    Diagnosis  POA    Personal history of malignant neoplasm of breast [Z85.3]  Not Applicable      Resolved Hospital Problems   No resolved problems to display.       Cholo Au MD  Plastic Surgery PGY-6  [phone]

## 2020-09-02 NOTE — NURSING
VSS. AAOx4. Pt arrived to floor via stretcher with AYDEN Lopez and transferred to bed. IVF started, TEDs/SCDs applied, IS at bedside, oriented to room, call light placed within reach, bed low and locked, and  at bedside. Pt complains of pain 8/10. Caputo noted draining yellow urine to gravity. BL flaps, WNL (see flowsheet)  No acute distress noted at this time. Will continue to monitor.

## 2020-09-02 NOTE — PLAN OF CARE
Patient in no apparent distress. Sat's 88  % on room air. Patient returned to 2 lpm, sat's 94%. Is done . Will continue to monitor.

## 2020-09-02 NOTE — ANESTHESIA PROCEDURE NOTES
Intubation  Performed by: Brittanie Xie CRNA  Authorized by: Vicente Zuñiga MD     Intubation:     Induction:  Intravenous    Intubated:  Postinduction    Mask Ventilation:  Easy mask    Attempts:  1    Attempted By:  Staff anesthesiologist    Blade:  Anand 3    Laryngeal View Grade: Grade I - full view of chords      Difficult Airway Encountered?: No      Complications:  None    Airway Device Size:  7.0    Style/Cuff Inflation:  Cuffed    Inflation Amount (mL):  3    Tube secured:  23    Secured at:  The lips    Placement Verified By:  Capnometry    Complicating Factors:  None    Findings Post-Intubation:  BS equal bilateral

## 2020-09-03 NOTE — PLAN OF CARE
Plan of care reviewed with patient. Pt remains free from fall, injury, and skin breakdown.  Pt pain controlled with current pain regimen. Blood glucose monitoring maintained. IVFs as order. Caputo to gravity draining clear yellow urine. Flap checks every 4hrs and skin is warm, good cap refill and no evidence of hematoma.  Purposeful hourly rounding done. Safety maintained. Patient lying in bed in no distress.

## 2020-09-03 NOTE — PROGRESS NOTES
Plastic Surgery Progress Note    S: NAEO, flap checks intact, pain well controlled, tolerating CLD      O:  Vitals:    09/03/20 1350   BP:    Pulse:    Resp: 18   Temp:      General: NAD, A&Ox3  CV: RRR  Pulm: CTAB  Abd: Incision c/d/i with perino tape and dermabond, drain 95 cc serosang  Breast:   Right: Soft, skin paddle pink with good cap refill, + internal doppler, drain 32.5 cc serosang  Left: Soft, skin paddle pink with good cap refill, + internal doppler, drain 32.5 cc serosang        A/P: 45 year old female with history of left breast cancer s/p bilateral mastectomies and implant reconstruction. She received adjuvant XRT, which caused severe left capsular contracture and radiation skin changes. She is now s/p bilateral implant removal and bilateral autologous breast reconstruction (YOLIS MACIAS) on 9/2/2020.     1. Flap care  -Continue q4h flap checks for doppler signal and physical exam, call plastic surgery immediately with any flap changes  -Advance to diabetic diet, fluids once tolerating PO  -Remove jorgensen  -OOB to chair  -Continue current pain regimen  -Continue Ancef  -Continue all drains    2. DM  -Diabetic diet  -ISS    3. HLD  -Atorvastatin    DVT ppx: Lovenox 40mg q12h    Cholo Au MD  Plastic Surgery PGY-6  [phone]

## 2020-09-03 NOTE — NURSING
No significant events this shift. Remains free from fall, injury, and skin breakdown. Ambulates independently to bathroom. VSS stable on 2L NC and afebrile. Positions self independently. Pain controlled with PO PRN meds. Neuro checks WDL. TEDs/SCDs maintained.Tolerating ordered diet. IV site WNL. Q4 flap checks maintained with internal doppler checks. Plan of care reviewed with patient and all questions answered. Bed low, locked w/ bed alarm on. Call light within reach. Purposeful rounding performed. No other complaints at this time.

## 2020-09-03 NOTE — PLAN OF CARE
Initial Discharge Planning Assessment:     Patient admitted on 9/3/20  PCP updated in Epic: YOBANI Douglas NP   Pharmacy, updated in [facility]: [facility]  DME at home: glucometer  Current dispo: Pt lives at home with family   Transportation: Family to drive   Power of  or Living Will: none  Hospital Readmission: none      Case management  to follow.              09/03/20 1011   Discharge Assessment   Assessment Type Discharge Planning Assessment   Confirmed/corrected address and phone number on facesheet? Yes   Assessment information obtained from? Patient;Caregiver   Communicated expected length of stay with patient/caregiver yes   Prior to hospitilization cognitive status: Alert/Oriented   Prior to hospitalization functional status: Independent   Current cognitive status: Alert/Oriented   Current Functional Status: Independent   Lives With significant other   Able to Return to Prior Arrangements yes   Is patient able to care for self after discharge? Yes   Readmission Within the Last 30 Days no previous admission in last 30 days   Patient currently being followed by outpatient case management? No   Patient currently receives any other outside agency services? No   Equipment Currently Used at Home   (glucometer)   Do you have any problems affording any of your prescribed medications? No   Is the patient taking medications as prescribed? yes   Does the patient have transportation home? Yes   Transportation Anticipated family or friend will provide   Does the patient receive services at the Coumadin Clinic? No   Discharge Plan A Home with family   DME Needed Upon Discharge  none   Patient/Family in Agreement with Plan yes

## 2020-09-04 NOTE — PROGRESS NOTES
Plastic Surgery Progress Note    S: NAEO, flap checks intact, pain well controlled, tolerating regular diet      O:  Vitals:    09/04/20 0751   BP: 113/73   Pulse: 101   Resp: 18   Temp: 98.6 °F (37 °C)     General: NAD, A&Ox3  CV: RRR  Pulm: CTAB  Abd: Incision c/d/i with perino tape and dermabond, drain 160 cc serosang  Breast:   Right: Soft, skin paddle pink with good cap refill, + internal doppler, drain 25 cc serosang  Left: Soft, skin paddle pink with good cap refill, + internal doppler, drain 55 cc serosang        A/P: 45 year old female with history of left breast cancer s/p bilateral mastectomies and implant reconstruction. She received adjuvant XRT, which caused severe left capsular contracture and radiation skin changes. She is now s/p bilateral implant removal and bilateral autologous breast reconstruction (R SIEA, L ASHLEY) on 9/2/2020.      1. Flap care  -Continue q4h flap checks for doppler signal and physical exam, call plastic surgery immediately with any flap changes  -Diabetic diet  -Ambulate  -Continue current pain regimen  -Continue Ancef  -Continue all drains     2. DM  -Diabetic diet  -ISS     3. HLD  -Atorvastatin     DVT ppx: Lovenox 40mg q12h     Cholo Au MD  Plastic Surgery PGY-6  [phone]

## 2020-09-04 NOTE — NURSING
"Elevation in pulse and temp. Pt sitting up in chair eating dinner. Pt stated "Im feeling fine; I usually get a temp when I haven't had a BM in a few day." Tylenol 650 PO PRN given;  notified. Will continue to monitor.   "

## 2020-09-04 NOTE — NURSING
Pt remained free from fall and injury throughout the night. VSS. Pt is on 2L NC due to desating in the low 90s after ambulation to bathroom. Pt had sanguinous and  drainage from R ABD drain site and serosanguinous from R/L Breast drain sites. Biopatch changed and reinforced with Tegaderm to R ABD drain site. ABD pad applied to prevent skin breakdown and to monitor additional drainage, if necessary. Pt's pain controlled with prn pain medications, per order. Changed IV site dressing as it had minimal serosanguinous drainage noted. Q4 doppler checks maintained. Strong pulses noted. Incision sites monitored. ABD pads changed. Abd binder repositioned for comfort. Fluids promoted and provided. SCDs maintained for VTE prophalaxis. ABT administered, per order. Pt ambulatory independently in room to bathroom. Bed locked and in lowest position. Call light within reach. No acute distress noted. Purposeful rounding maintained. Will continue to monitor.

## 2020-09-05 NOTE — NURSING
Pt remained free from fall and injury throughout the night. POC reviewed with pt. Pt pain controlled with prn medications. Flap and doppler checks performed q4h. Pt's flaps warm with good capillary refill less than 3 seconds. Drain output noted in flowcharts draining serosanguinous fluid. Pt on ABT, per order. Pt's IV infiltrated; removed. House Sup inserted a 22 G RFA. Pt is resting in bed comfortably. No acute distress noted. Bed locked and in lowest position.Call light within reach. Purposeful rounding maintained. Will continue to monitor.

## 2020-09-05 NOTE — NURSING
Remains free from fall, injury, and skin breakdown. Ambulates independently to bathroom. Positions self independently. Fever resolving with PRN meds and deep breathing; still running tachy. Pain controlled with PRN meds. Neuro checks WDL. TEDs/SCDs maintained.Tolerating ordered diet. IV site WNL. Q 4 flap checks maintained this shift with doppler. DARREL drain to abdomen putting out significant amount of drainage. Plan of care reviewed with patient and all questions answered. Bed low, locked w/ bed alarm on. Call light within reach. Purposeful rounding performed. No other complaints at this time.

## 2020-09-05 NOTE — PLAN OF CARE
Pt on RA SPO2 91-93% ;Incentive spirometry encouraged to increase saturations.Will continue to monitor.

## 2020-09-05 NOTE — PLAN OF CARE
Plan of care reviewed with pt, verbalizes understanding. VSS. Pt AAOx4, respirations even and unlabored. Pt ambulatory and independent. Voids spontaneously. Purposeful hourly rounding done. Glucose monitoring maintained. No further concerns noted. Pt up in chair at present. Call light and personal items within reach. Will continue to monitor.

## 2020-09-05 NOTE — PROGRESS NOTES
Plastic Surgery Progress Note    S: NAEO, flap checks intact, pain well controlled, tolerating regular diet, walking    Afebrile overnight, tachycardia improved (HR low 100s), BP wnl      O:  Vitals:    09/05/20 0801   BP:    Pulse: 107   Resp: 19   Temp:      General: NAD, A&Ox3  CV: RRR  Pulm: CTAB  Abd: Incision c/d/i with perino tape and dermabond, drain 505 cc serous  Breast:   Right: Soft, skin paddle pink with good cap refill, + internal doppler, drain 25 cc serosang  Left: Soft, skin paddle pink with good cap refill, + internal doppler, drain 45 cc serosang        A/P: 45 year old female with history of left breast cancer s/p bilateral mastectomies and implant reconstruction. She received adjuvant XRT, which caused severe left capsular contracture and radiation skin changes. She is now s/p bilateral implant removal and bilateral autologous breast reconstruction (NING MYERS, YOLIS ASHLEY) on 9/2/2020.      1. Flap care  -Continue q4h flap checks for doppler signal and physical exam, call plastic surgery immediately with any flap changes  -Diabetic diet  -Ambulate  -Continue current pain regimen  -Continue Ancef  -Continue all drains     2. DM  -Diabetic diet  -ISS     3. HLD  -Atorvastatin     DVT ppx: Lovenox 40mg q12h       Deny Kirk MD  HOIII  PRS  09/05/2020  [phone]

## 2020-09-05 NOTE — PLAN OF CARE
Patient in no apparent distress. Sat's 92  % on room air. IS done per patient . Will continue to monitor.

## 2020-09-06 NOTE — OP NOTE
PLASTIC SURGERY OPERATIVE REPORT    Date of Procedure: 9/2/2020     Patient Name: Blessing Maddox    MRN: [id number]    Pre-Operative Diagnosis:   1. Personal history of malignant neoplasm of breast [Z85.3]  2. Acquired absence of bilateral breasts  3. History of bilateral tissue expander reconstruction with capsular contracture  4. Breast asymmetry   5. History of port placement    Post-Operative Diagnosis: Post-Op Diagnosis Codes:  1. Personal history of malignant neoplasm of breast [Z85.3]  2. Acquired absence of bilateral breasts  3. History of bilateral tissue expander reconstruction with capsular contracture  4. Breast asymmetry   5. History of port placement      Anesthesia: General, Bilateral Intercostal nerve block with Exparil, Bilateral TAP block with Exparil     Procedure:   1. Removal of intact right breast tissue expander  2. Right breast capsulotomy  3. Right breast reconstruction with free flap (Superficial inferior epigastric artery flap)  4. Right intercostal nerve block   5. Removal of intact left breast tissue expander  6. Left breast capsulotomy  7. Left breast reconstruction with free flap (Deep inferior epigastric artery  flap)  8. Left intercostal nerve block  9. Port removal     Surgeon(s) and Role:     * Yung Medrano MD - Primary     * Moi Pennington MD - Assisting     Cholo Au MD (Resident)      Description of the Findings of the Procedure:   Intact bilateral breast flaps at end of case with strong internal doppler signals    Micro Information  For each flap:  · Recipient site: Right breast   · Weight: 942 g   · Number of perforators: N/A  · Name arterial pedicle: SIEA  · Recipient artery: Right ADILIA  · Estimate size of arterial anastomosis: 2.5 mm  · Pedicle vein, donor/ recipient,  sizer: Donor - SIEV, Recipient - Lateral IMV, 2.5 mm  · Additional veins, donor/recipient: Donor - SIEV, Recipient - Medial IMV, 2.0 mm    For each flap:  · Recipient site:  Left breast   · Weight: 1386 g   · Number of perforators: 2  · Name arterial pedicle: DIEA  · Recipient artery: Left ADILIA  · Estimate size of arterial anastomosis: 2.5 mm  · Pedicle vein, donor/ recipient,  sizer: Donor - VC, Recipient - Lateral IMV, 2.5 mm  · Additional veins, donor/recipient: Donor - VC, Recipient - Medial IMV, 2.0 mm      Complications: No           Implants:   Implant Name Type Inv. Item Serial No.  Lot No. LRB No. Used Action   Natrelle Tissue expander w/suture tabs Smooth full height extra projection with MAGNA - FINDER Xact &amp; 21G needle injrsion set   [id number]   Left 1 Explanted   Natrelle Tissue expander w/suture tabs smooth full height extra projection with MAGNA -FINDER Xact &amp;21G Needle Infusion Set   [id number]   Right 1 Explanted    [employer] MI[employer] VASC [employer]NAS - [id number]   SYSTEM MICRO VASC ANAS  [employer] [id number] Right 1 Implanted    2.0MM - [id number]   2.0MM  [employer] [id number] Left 1 Implanted    2.0MM - [id number]   2.0MM  [employer] [id number] Right 1 Implanted    [employer] ANSTMS 2.5MM - [id number]   [employer] ANSTMS 2.5MM  [employer] MICRO COMPANIES   1 Implanted       INTAKE / OUTPUT  Estimated blood loss: 100 mL  Urine output: See anesthesia report  Fluid Replacement: See anesthesia report      SPECIMENS: NONE     CULTURES: NONE     DRAINS:  - ABDOMEN: 19 FR LURDES  - BREAST: 15 FR LURDES TO EACH BREAST  Bladder indwelling     COMPLICATIONS: None.     COUNTS: Sponge and needle counts correct. Radiofrequency negative     DISPOSITION: Extubated to postanesthesia care unit.     INDICATIONS  Ms. Maddox is a 45 year old female with history of left breast cancer s/p bilateral mastectomies with tissue expander reconstruction. She underwent adjuvant XRT and subsequently developed severe radiation skin damage and capsular contracture . She presents to surgery  for bilateral removal of breast tissue expanders with abdominal autologous reconstruction. The risks, benefits, alternatives, expected outcomes, recovery time, and potential morbidity were discussed and the patient wished to proceed. Informed consent was obtained.     OPERATIVE PROCEDURE  The patient was met in the preoperative holding area. The patient denied an interval change in her health and review of preoperative screening tests was normal. The operative sites were marked including sternal midline, IMF, and abdominal flap outline. Perforators were marked based on the CTA.      The patient was then taken to the operating room and a surgical time-out verified her identity, diagnosis, nature and sites of procedures. Necessary equipment was verified. The patient was given 3 grams cefazolin intravenously for antimicrobial prophylaxis; this was re-dosed every 6 hours throughout the case. SCD boots were placed. After induction of anesthesia and orotracheal intubation an indwelling bladder catheter was placed. The entire chest, both upper extremities and the abdomen were sterilely prepped and draped.     Markings were reconfirmed. Local with 1% lidocaine and 1:100,000 epinephrine solution was injected into all incision sites. Dissection in the chest and abdomen then proceeded simultaneously.     Bilateral abdominal flap harvest proceeded with the left and then moved on to the right.  The upper flap marking was incised with a #15 blade. Dissection was carried through skin, subcutaneous tissue, and Mitch's fascia with Bovie electrocautery. Dissection was carried inferiorly until the main medial row  was identified. An additional  was found inferiorly, which was thought to be in series. At this point, the inferior incision was made with a #15 blade. Dissection was carried through skin, subcutaneous tissue, and Mitch's fascia with Bovie electrocautery. The SIEA and SIEV were transected during a prior  abdominal surgery. The flap was then completely elevated around the aforementioned perforators. The rectus fascia was then opened around the perforators with scissors. The perforators were traced back to their origins off the DIEA. The pedicle was then dissected distally several cm and clipped with a medium hemoclip. The pedicle was then dissected proximally clipping all muscle perforators until enough length was obtained near its origin. Once this dissection was complete, attention was turned towards the right abdomen. Pre-operative CTA revealed a very large SIEA/SIEV. Therefore, the inferior abdominal flap incision was made first with a #15 blade.The SIEV was dissected for several cm close to its origin and clipped with hemoclips. Dissection was carried under Mitch's fascia and the SIEA vessel was dissected back to the takeoff from the femoral artery. Both of these vessels were of sufficient caliber to power the flap, therefore we elected to raise the flap on these vessels. The flap was then raised off of the abdominal wall towards to the superior marking with Bovie electrocautery. All ASHLEY vessels were carefully clipped. The upper abdominal marking was then incised with a #15 blade and carried down to the abdominal wall with Bovie electrocautery.  Both flaps were then entirely elevated on their pedicles and ready for transection. The umbilicus was released from the abdominal skin with a #15 blade. The superior abdominal wall flap was elevated to the xiphoid superiorly and to the rib margin laterally with careful attention to preserve lateral perforators if possible. The health of the flaps were confirmed by doppler signal, capillary refill and robust bleeding from the edge. The flaps were stapled in place while the chest vessels were prepared.      In the chest, the previous horizontal mastectomy scars were excised.On the left, the severely damaged radiated skin was sharply debrided with a #15 blade to healthy  bleeding native breast skin.  The skin, subcutaneous tissue, and implant capsule were dissected with Bovie electrocautery. The tissue expanders were removed intact. Circumferential capsulotomies were performed to allow room for the flap. The right chest internal mammary vessels were isolated followed by the left in the exact same manner. First, the 3rd rib was identified. Electrocautery was used to dissect through the pectoralis muscle to create a superior flap of muscle. The 3rd rib was exposed and perichondrium incised. The perichondrium was then elevated off the cartilaginous portion with a freer elevator. Bovie was used to dissect intercostal muscle free from the 2nd and 4th ribs superiorly and inferiorly along the length of the cartilaginous portion of the ribs. The cartilaginous portion of the 3rd rib was removed leaving intact perichondrium. The perichondrium was split along its length to identify the internal mammary vessels which were healthy and intact. Bipolar cautery was then used to dissect remaining intercostal muscle fibers away from the course of the vessels superiorly and inferiorly. Once the course of the vessels was free, remaining dissection was performed under an operating microscope. Vessels were cleaned of surrounding fat and prepared for microsurgical anastomosis.      The operating microscope was then brought into the field and chest vessels prepared, starting with the right chest. 3V vascular clamps were used to clamp the proximal IMV and ADILIA and the distal end of the ADILIA was clipped and transected. The distal IMV was clamped and preserved as a possible retrograde system. The IMV was transected in the distal midportion. The right SIEA flap was harvested and brought to the right chest. The pedicle was dissected and vessels  for a few cm. The lay of the pedicle was verified to be free of kink or twist. Venous anastomosis was then performed using a 2.5 mm venous  to the lateral  IMV. Similarly, an additional venous anastomosis to the medial IMV was performed with a 2.0 mm venous . The arterial anastomosis was sewn using interrupted 9-0 nylon. Vascular clamps were removed and excellent flow confirmed. The distal IMVs were clipped. The flap was inset in a few places. The left ASHLEY flap was then harvested and brought to the left chest. The pedicle was dissected and vessels  for a few cm. The lay of the pedicle was verified to be free of kink or twist. Two antegrade venous anastomoses were then performed using a 2.5 mm venous  to the medial IMV and a 2.0 mm venous  to the lateral IMV . The arterial anastomosis was sewn using a running 9-0 nylon. Vascular clamps were removed and excellent flow confirmed.     The flaps were then shaped and inset. Excess tissue was removed from zone 4. Significantly more right flap skin was de-epithelialized. An internal doppler was placed on the SIEA. The left breast was minimally de-epithelialized, because we needed to replace a large amount of radiated skin. A large skin paddle was left. An internal doppler was placed on the ASHLEY. 2-0 Vicryl sutures were used to inset the flaps along the medial and lateral breast border, IMF, and superior pole. The native skin envelope of the breast was contoured to the flap by tailor-tacking. Exparel was infiltrated for local intercostal block and along incisions. 15 Fr lydia drains were placed bilaterally and secured to skin with 3-0 Nylon sutures. The skin of the flap was closed to native skin with interrupted 3-0 Monocryl deep dermal sutures, followed by running 3-0 Quill in the subcuticular plane. Internal doppler signals were confirmed. Lastly on the right chest, the previously placed port-a-cath was removed. The previous scar was incised. The port was dissected free of all surrounding tissue and the tract was tied off with a figure of eight 2-0 Vicryl suture. The port was completely removed  and the knot secured around the tract. Hemostasis was obtained with bovie electrocautery. The incision was closed with 3-0 Monocryl deep dermal sutures and 4-0 Monocryl subcuticular suture.      The abdomen was closed simultaneously. Hemostasis was achieved. Irrigation was performed. Bilateral TAP blocks were performed with Exparil. The back and legs of the bed were brought up and abdomen was closed. Mitch's layer was approximated with 0 polysorb followed by interrupted 3-0 Monocryl for deep dermal sutures and 3-0 Quill subcuticular suture. A 19 Fr Maury drain was placed and secured to the skin with 3-0 Nylon suture. The umbilicus was re-inset with 3-0 Monocryl deep dermal sutures and 4-0 Monocryl subcuticular suture. Sterile dressings were applied with Perino tpae and Dermabond. Doppler signals were reconfirmed.     This concluded the operation. All needle and instrument counts were correct. The patient tolerated surgery well without complications. The patient was then awakened, extubated, and transferred to recovery in good condition.     Cholo Au MD  Plastic Surgery PGY-6  [phone]

## 2020-09-06 NOTE — PROGRESS NOTES
Plastic Surgery Progress Note    S: NAEO    O:  Vitals:    09/06/20 0608   BP: 112/60   Pulse: 102   Resp: 18   Temp: 98.7 °F (37.1 °C)     General: NAD, A&Ox3  CV: RRR  Pulm: CTAB  Abd: Incision c/d/i with perino tape and dermabond, drain 260 cc serous  Breast:   Right: Soft, skin paddle pink with good cap refill, + internal doppler, drain 22 cc serosang  Left: Soft, skin paddle pink with good cap refill, + internal doppler, drain 25 cc serosang        A/P: 45 year old female with history of left breast cancer s/p bilateral mastectomies and implant reconstruction. She received adjuvant XRT, which caused severe left capsular contracture and radiation skin changes. She is now s/p bilateral implant removal and bilateral autologous breast reconstruction (YOLIS MACIAS) on 9/2/2020.    Doing well post op  DC to home today with drains in place  Clipped internal doppler at [facility]       Deny Kirk MD  HOIII  PRS  09/06/2020  [phone]

## 2020-09-06 NOTE — DISCHARGE SUMMARY
PRS Discharge Summary    Admit Date: 9/2/2020    Discharge Date and Time: 9/6/20    Attending Physician:  Yung Medrano MD    Discharge Provider:  Yung Medrano MD    Active Problems:   Patient Active Problem List   Diagnosis    Endometrial cancer    S/P BSO (bilateral salpingo-oophorectomy)    Vasomotor symptoms due to menopause    Depression    COOPER (stress urinary incontinence, female)    Insomnia    Diabetes mellitus    Elevated LFTs    Fatty liver    Abnormal CT scan, gastrointestinal tract    Abnormal CT scan    Endometrial hyperplasia    Malignant neoplasm of lower-outer quadrant of left female breast    Triple negative malignant neoplasm of breast    Malignant neoplasm of lower-outer quadrant of left breast of female, estrogen receptor negative    Port-A-Cath in place    Hypertension    Musculoskeletal pain of right upper extremity    Hypomagnesemia    Neuropathy    Malignant neoplasm of left female breast    C. difficile diarrhea    Triple negative malignant neoplasm of breast    S/P chemotherapy, time since greater than 12 weeks    S/P bilateral mastectomy    S/P radiation therapy greater than twelve weeks ago    Breast pain    Personal history of malignant neoplasm of breast        Discharged Condition: good    Reason for Admission: Personal history of malignant neoplasm of breast    Hospital Course: Patient tolerate procedure and anesthesia well. Bilateral implant removal and bilateral autologous breast reconstruction (R SIEA, L ASHLEY) on 9/2/2020 performed without complication.    Consults: none    Significant Diagnostic Studies: None    Treatments/Procedures: Procedure(s) (LRB): anesthesia for exam.  bilateral implant removal and bilateral autologous breast reconstruction (R SIEA, L ASHLEY) on 9/2/2020.     Disposition: Home or Self Care    Patient Instructions:   Current Discharge Medication List      START taking these medications    Details   cephALEXin (KEFLEX) 500  MG capsule Take 1 capsule (500 mg total) by mouth every 6 (six) hours. for 7 days  Qty: 28 capsule, Refills: 0      HYDROcodone-acetaminophen (NORCO) 5-325 mg per tablet Take 1 tablet by mouth every 4 (four) hours as needed.  Qty: 30 tablet, Refills: 0    Comments: Quantity prescribed more than 7 day supply? Yes, quantity medically necessary         CONTINUE these medications which have NOT CHANGED    Details   atorvastatin (LIPITOR) 20 MG tablet TAKE 1 TABLET(20 MG) BY MOUTH EVERY DAY  Qty: 90 tablet, Refills: 3    Associated Diagnoses: Hyperlipidemia, unspecified hyperlipidemia type      citalopram (CELEXA) 20 MG tablet Take 1.5 tablets (30 mg total) by mouth once daily. Start 1/2 tablet (10 mg) x 1 week, then increase as tolerating  Qty: 30 tablet, Refills: 5    Associated Diagnoses: Current mild episode of major depressive disorder without prior episode      gabapentin (NEURONTIN) 300 MG capsule Take 3 capsules (900 mg total) by mouth 3 (three) times daily.  Qty: 270 capsule, Refills: 11    Associated Diagnoses: Neuropathy      insulin (LANTUS SOLOSTAR U-100 INSULIN) glargine 100 units/mL (3mL) SubQ pen Inject 60 Units into the skin every evening.  Qty: 18 mL, Refills: 11    Associated Diagnoses: Diabetes mellitus type 2, uncontrolled, without complications      insulin lispro (HUMALOG KWIKPEN INSULIN) 100 unit/mL pen Inject 10 Units into the skin 3 (three) times daily before meals.  Qty: 27 mL, Refills: 3      lidocaine (LIDODERM) 5 % Place 1 patch onto the skin daily as needed. Remove & Discard patch within 12 hours or as directed by MD as needed for pain.  Qty: 30 patch, Refills: 5      liraglutide 0.6 mg/0.1 mL, 18 mg/3 mL, subq PNIJ (VICTOZA 2-BRITTANY) 0.6 mg/0.1 mL (18 mg/3 mL) PnIj Inject 1.8 mg into the skin once daily.  Qty: 9 mL, Refills: 11    Associated Diagnoses: Diabetes mellitus type 2, uncontrolled, without complications      lisinopril 10 MG tablet TAKE 1 TABLET(10 MG) BY MOUTH EVERY DAY  Qty: 90  "tablet, Refills: 3    Associated Diagnoses: Uncontrolled type 2 diabetes mellitus without complication, without long-term current use of insulin      metFORMIN (GLUCOPHAGE) 1000 MG tablet TAKE 1 TABLET(1000 MG) BY MOUTH TWICE DAILY WITH MEALS  Qty: 180 tablet, Refills: 3    Associated Diagnoses: Uncontrolled type 2 diabetes mellitus without complication, without long-term current use of insulin      ondansetron (ZOFRAN-ODT) 8 MG TbDL DISSOLVE 1 TABLET(8 MG) ON THE TONGUE EVERY 8 HOURS AS NEEDED  Qty: 90 tablet, Refills: 3    Associated Diagnoses: Triple negative malignant neoplasm of breast      oxyCODONE-acetaminophen (PERCOCET) 7.5-325 mg per tablet Take 1 tablet by mouth every 8 (eight) hours as needed for Pain. Take sparingly  Qty: 60 tablet, Refills: 0    Comments: Quantity prescribed more than 7 day supply? Yes, quantity medically necessary  Associated Diagnoses: Encounter for chemotherapy management; S/P bilateral mastectomy; Triple negative malignant neoplasm of breast      BD INSULIN SYRINGE ULTRA-FINE 0.3 mL 31 gauge x 5/16" Syrg USE AS DIRECTED THREE TIMES DAILY WITH MEALS  Qty: 100 each, Refills: 11    Associated Diagnoses: Diabetes mellitus type 2, uncontrolled, without complications      BD ULTRA-FINE SHINE PEN NEEDLE 32 gauge x 5/32" Ndle USE AS DIRECTED EVERY EVENING  Qty: 90 each, Refills: 3    Associated Diagnoses: Diabetes mellitus type 2, uncontrolled, without complications      ONETOUCH DELICA LANCETS 33 gauge Misc Inject 1 lancet into the skin 4 (four) times daily as needed.  Qty: 200 each, Refills: 5    Associated Diagnoses: Uncontrolled type 2 diabetes mellitus without complication, without long-term current use of insulin      ONETOUCH ULTRA BLUE TEST STRIP Strp USE FOUR TIMES DAILY AS NEEDED  Qty: 200 strip, Refills: 0    Associated Diagnoses: Uncontrolled type 2 diabetes mellitus without complication, without long-term current use of insulin      ONETOUCH ULTRA2 kit U TO TEST BLOOD SUGAR " "D  Refills: 2               Discharge Procedure Orders (must include Diet, Follow-up, Activity)   Discharge Procedure Orders (must include Diet, Follow-up, Activity)   COVID-19 Routine Screening   Standing Status: Future Number of Occurrences: 1 Standing Exp. Date: 10/23/21     Order Specific Question Answer Comments   Is the patient symptomatic? No    Is this needed for pre-procedure or pre-op testing? Yes    Diagnosis: Preop testing [413302]      Diet Adult Regular     No driving until:   Order Comments: Clinic visit     Change dressing (specify)   Order Comments: Keep surgical bra and abdominal binder with ABD padding on at all times except showers.  May take showers, no baths/pools     Nursing communication   Order Comments: Please teach drain care before patient leaves  Must record output of each drain separately     Weight bearing restrictions (specify):   Order Comments: No heavy lifting/pushing/pulling        Discharge instructions - Please contact Plastic Surgery team or nearest emergency department if,  1) Redness/purulent drainage from incision sites  2) fever/chills  3) Respiratory difficulty (including: noisy breathing, nasal flaring, "barky" cough or wheezing).  4) Persistent pain not responsive to prescribed medications (if any).  5) Repeating or recurrent episodes of vomiting.  6) Inability to tolerate oral fluids.    Deny Kirk MD  HO[facility]  PRS  09/06/2020    "

## 2020-09-06 NOTE — NURSING
Pt to be discharged home. Donta drain explained and pt verbalizes understanding. Flap checks intact c good circulation to morgan breast c flaps warm and pink. Abd pads on and support bra. Abdominal incision healing c wound edges approximated. Abdominal binder on. Medicated for pain c good relief. Vs stable. Pt given discharge instructions and verbalizes understanding of follow up and s/s to call  For and follow up appointment.Discharged home in stable condition.

## 2020-09-06 NOTE — NURSING
Pt remained free from fall and injury throughout the night. VSS. POC reviewed with pt. Pt pain controlled with prn medications. Flap and doppler checks performed q4h. Pt's flaps warm with good capillary refill less than 3 seconds. Drain output noted in flowcharts draining serosanguinous fluid. Biopatches changed and reinforced with Tegaderm. ABD pads changed. Drain sponges added to prevent skin irritation and breakdown at the drain sites.Pt is resting in bed comfortably. No acute distress noted. Bed locked and in lowest position.Call light within reach. Purposeful rounding maintained. Will continue to monitor.

## 2020-10-22 PROBLEM — R56.9 SEIZURE-LIKE ACTIVITY: Status: ACTIVE | Noted: 2020-01-01

## 2020-10-23 PROBLEM — C50.919 BREAST CANCER IN FEMALE: Status: ACTIVE | Noted: 2019-02-08

## 2020-10-23 PROBLEM — K76.9 LIVER LESION: Status: ACTIVE | Noted: 2020-01-01

## 2020-10-23 PROBLEM — C50.912 BREAST CANCER, LEFT: Status: ACTIVE | Noted: 2019-02-08

## 2020-10-27 PROBLEM — R50.9 FEVER: Status: ACTIVE | Noted: 2020-01-01

## 2020-10-27 PROBLEM — R51.9 HEADACHE: Status: ACTIVE | Noted: 2020-01-01

## 2020-10-29 PROBLEM — Z85.3 HISTORY OF BREAST CANCER: Status: ACTIVE | Noted: 2020-01-01

## 2020-11-18 PROBLEM — C50.919 METASTATIC BREAST CANCER: Status: ACTIVE | Noted: 2020-01-01

## 2020-12-02 PROBLEM — F41.9 ANXIETY: Status: ACTIVE | Noted: 2020-01-01

## 2020-12-02 NOTE — LETTER
December 2, 2020      Ross Soliz MD  [address]  [facility] [address]           [facility]  [address] [facility] [address]  Phone: [phone]  Fax: [phone]          Patient: Blessing Maddox   MR Number: [id number]   YOB: 1975   Date of Visit: 12/2/2020       Dear Dr. Ross Soliz:    Thank you for referring Blessing Maddox to me for evaluation. Attached you will find relevant portions of my assessment and plan of care.    If you have questions, please do not hesitate to call me. I look forward to following Blessing Maddox along with you.    Sincerely,    CHUN Block    Enclosure  CC:  No Recipients    If you would like to receive this communication electronically, please contact [email or web address] or [phone] to request more information on [employer] access.    For providers and/or their staff who would like to refer a patient to [facility], please contact us through our one-stop-shop provider referral line, [facility], at [phone].    If you feel you have received this communication in error or would no longer like to receive these types of communications, please e-mail [email or web address]

## 2020-12-02 NOTE — PROGRESS NOTES
Subjective:       Patient ID: Blessing Maddox is a 45 y.o. female.    Chief Complaint: Seizures    HPI  Seizure Disorder  Blessing Maddox is a 45 y.o. female is here for first evaluation for seizures.  First seizure - about one year ago while receiving chemo.Mostly if overwhelmed or anxious  Last seizure - last episode in October. She was hospitalized. She had a spell in chemo.  Average frequency per month -she was having spells everyday until October. Started on diazepam. None since hospitalization.  Birth or developmental abnormality - No  Head injury or brain injury - No  Alcohol or drug abuse history - No  Family history of seizure - No  Medications intolerance/ineffective - valium, sedation. Does not take it daily.  Compliant with medications - Yes  Aura - Yes, she feels tremulous  Description of seizure - she starts to feel tremulous then she starts shaking more strongly all over. Not violent, not tonic clonic. She has never lost awareness. One episode with urinary incontinence. One episode with vomiting. No post ictal manifestations except headache.   states it was happening daily or even several times a day before. She is staying home more with less company, less stress. She is doing much better.      MRI brain normal 10/2020.  No EEG    She has recurrent breast cancer with metastatic disease to liver. Undergoing chemo and radiation currently.    Past Medical History:   Diagnosis Date    Breast cancer     Depression     Diabetes mellitus     DUB (dysfunctional uterine bleeding)     Endometrial hyperplasia     Fatty liver     HLD (hyperlipidemia)     Hypertension     Low back pain     TIA (transient ischemic attack) 2013    due to stress    Triple negative malignant neoplasm of breast 1/10/2020    Uterine cancer        Past Surgical History:   Procedure Laterality Date    AXILLARY NODE DISSECTION Left 9/12/2019    Procedure: LYMPHADENECTOMY, AXILLARY;  Surgeon: Mary Soriano MD;   [address];  Service: General;  Laterality: Left;    BILATERAL SALPINGOOPHORECTOMY  2015    Pelvic lymph node dissection    BREAST BIOPSY Left 2019    us guided     SECTION      x5    CHOLECYSTECTOMY  1998    COLONOSCOPY N/A 2017    Procedure: COLONOSCOPY;  Surgeon: Jerome Amos MD;  [facility];  Service: Endoscopy;  Laterality: N/A;    HYSTERECTOMY  6-4-15    hysteroscopy, D&C  4-23-15    INJECTION FOR SENTINEL NODE IDENTIFICATION Left 2019    Procedure: INJECTION, FOR SENTINEL NODE IDENTIFICATION;  Surgeon: Mary Soriano MD;  [address];  Service: General;  Laterality: Left;    INSERTION OF BREAST TISSUE EXPANDER Bilateral 2019    Procedure: INSERTION, TISSUE EXPANDER, BREAST;  Surgeon: Holden Abernathy MD;  Location: [address];  Service: Plastics;  Laterality: Bilateral;    INSERTION OF TUNNELED CENTRAL VENOUS CATHETER (CVC) WITH SUBCUTANEOUS PORT Right 2/15/2019    Procedure: [id number];  Surgeon: Grupo Sheriff MD;  Location: [facility];  Service: General;  Laterality: Right;  right subclavian    INSERTION OF TUNNELED CENTRAL VENOUS CATHETER (CVC) WITH SUBCUTANEOUS PORT Right 2020    Procedure: [id number];  Surgeon: Luis Bogran-Reyes, MD;  Location: [facility];  Service: General;  Laterality: Right;  INTERNAL JUGULAR      LIVER BIOPSY Right 10/29/2020    Procedure: BIOPSY, LIVER;  Surgeon: Cain Bella MD;  Location: [facility];  Service: Radiology;  Laterality: Right;    MASTECTOMY Bilateral 2019    Procedure: MASTECTOMY-skin sparing;  Surgeon: Mary Soriano MD;  Location: [address];  Service: General;  Laterality: Bilateral;    RECONSTRUCTION OF BREAST WITH DEEP INFERIOR EPIGASTRIC ARTERY  (ASHLEY) FREE FLAP Bilateral 2020    Procedure: RECONSTRUCTION, BREAST, USING ASHLEY FREE FLAP;  Surgeon: Yung Medrano MD;  Location: [facility];  Service: Plastics;   Laterality: Bilateral;    REMOVAL OF VASCULAR ACCESS PORT  9/2/2020    Procedure: REMOVAL, VASCULAR ACCESS PORT;  Surgeon: Yung Medrano MD;  [facility];  Service: Plastics;;    SENTINEL LYMPH NODE BIOPSY Left 9/12/2019    Procedure: BIOPSY, LYMPH NODE, SENTINEL;  Surgeon: Mary Soriano MD;  [facility];  Service: General;  Laterality: Left;    TISSUE EXPANDER REMOVAL Bilateral 9/2/2020    Procedure: REMOVAL, TISSUE EXPANDER;  Surgeon: Yung Medrano MD;  Location: [facility];  Service: Plastics;  Laterality: Bilateral;       Family History   Problem Relation Age of Onset    COPD Mother     Hypertension Mother     Stroke Mother         x3    Sleep apnea Mother     Heart disease Maternal Grandmother     Hypertension Maternal Grandmother     Ovarian cancer Maternal Aunt     Uterine cancer Maternal Aunt     Colon cancer Neg Hx     Esophageal cancer Neg Hx     Rectal cancer Neg Hx     Stomach cancer Neg Hx        Social History     Socioeconomic History    Marital status:      Spouse name: Not on file    Number of children: Not on file    Years of education: Not on file    Highest education level: Not on file   Occupational History    Not on file   Social Needs    Financial resource strain: Very hard    Food insecurity     Worry: Sometimes true     Inability: Often true    Transportation needs     Medical: Yes     Non-medical: Yes   Tobacco Use    Smoking status: Never Smoker    Smokeless tobacco: Never Used   Substance and Sexual Activity    Alcohol use: No     Alcohol/week: 0.0 standard drinks     Frequency: Never     Drinks per session: Patient refused     Binge frequency: Never    Drug use: No    Sexual activity: Not on file   Lifestyle    Physical activity     Days per week: 0 days     Minutes per session: Not on file    Stress: Very much   Relationships    Social connections     Talks on phone: More than three times a week     Gets together: Never      "Attends Buddhism service: Not on file     Active member of club or organization: No     Attends meetings of clubs or organizations: Never     Relationship status:    Other Topics Concern    Not on file   Social History Narrative    Not on file       Current Outpatient Medications   Medication Sig Dispense Refill    atorvastatin (LIPITOR) 20 MG tablet TAKE 1 TABLET(20 MG) BY MOUTH EVERY DAY 90 tablet 3    BD INSULIN SYRINGE ULTRA-FINE 0.3 mL 31 gauge x 5/16" Syrg USE AS DIRECTED THREE TIMES DAILY WITH MEALS 100 each 11    BD SHINE 2ND GEN PEN NEEDLE 32 gauge x 5/32" Ndle USE AS DIRECTED EVERY EVENING 100 each 11    citalopram (CELEXA) 20 MG tablet TAKE 1 TABLET(20 MG) BY MOUTH EVERY DAY. START 1/2 TABLET 10 MG FOR 1 WEEK, THEN. INCREASE AS TOLERATING 30 tablet 5    diazePAM (VALIUM) 5 MG tablet Take 1 tablet (5 mg total) by mouth every 12 (twelve) hours as needed for Anxiety (As needed for headaches that occur prior to seizure activity). 60 tablet 0    docusate sodium (COLACE) 100 MG capsule Take 1 capsule (100 mg total) by mouth 2 (two) times daily. 60 capsule 5    gabapentin (NEURONTIN) 300 MG capsule Take 3 capsules (900 mg total) by mouth 3 (three) times daily. 270 capsule 11    insulin (LANTUS SOLOSTAR U-100 INSULIN) glargine 100 units/mL (3mL) SubQ pen Inject 10 Units into the skin every evening. 3 mL 11    insulin lispro (HUMALOG KWIKPEN INSULIN) 100 unit/mL pen Inject 5 Units into the skin 3 (three) times daily before meals. 13.5 mL 3    lisinopril 10 MG tablet TAKE 1 TABLET(10 MG) BY MOUTH EVERY DAY 90 tablet 3    metoprolol succinate (TOPROL-XL) 25 MG 24 hr tablet Take 1 tablet (25 mg total) by mouth once daily. 30 tablet 5    morphine (MS CONTIN) 30 MG 12 hr tablet Take 1 tablet (30 mg total) by mouth 2 (two) times daily. 60 tablet 0    nortriptyline (PAMELOR) 10 MG capsule Take 1 capsule (10 mg total) by mouth every evening. 30 capsule 11    ONETOUCH DELICA LANCETS 33 gauge Misc " Inject 1 lancet into the skin 4 (four) times daily as needed. 200 each 5    ONETOUCH ULTRA BLUE TEST STRIP Strp USE FOUR TIMES DAILY AS NEEDED 200 strip 0    ONETOUCH ULTRA2 kit U TO TEST BLOOD SUGAR D  2    oxyCODONE (ROXICODONE) 15 MG Tab Take 1 tablet (15 mg total) by mouth every 8 (eight) hours as needed for Pain. 90 tablet 0    promethazine (PHENERGAN) 12.5 MG Tab Take 1 tablet (12.5 mg total) by mouth every 6 (six) hours as needed (nausea). 30 tablet 3    VICTOZA 3-BRITTANY 0.6 mg/0.1 mL (18 mg/3 mL) PnIj pen ADMINISTER 1.8 MG UNDER THE SKIN EVERY DAY 9 mL 11     No current facility-administered medications for this visit.      Facility-Administered Medications Ordered in Other Visits   Medication Dose Route Frequency Provider Last Rate Last Dose    diphenhydrAMINE (BENADRYL) 50 mg in sodium chloride 0.9% 50 mL IVPB  50 mg Intravenous 1 time in Clinic/HOD Melvin Chisholm MD        heparin, porcine (PF) 100 unit/mL injection flush 500 Units  500 Units Intravenous PRN Melvin Chisholm MD        heparin, porcine (PF) 100 unit/mL injection flush 500 Units  500 Units Intravenous PRN Melvin Chisholm MD   500 Units at 06/24/19 1017    lactated ringers infusion   Intravenous Continuous Nathalie Vickers MD        lidocaine (PF) 10 mg/ml (1%) injection 10 mg  1 mL Intradermal Once Nathalie Vickers MD        PACLitaxel (TAXOL) 80 mg/m2 = 180 mg in sodium chloride 0.9% 280 mL chemo infusion  80 mg/m2 (Treatment Plan Recorded) Intravenous 1 time in Clinic/HOD Melvin Chisholm MD        palonosetron (ALOXI) 0.25 mg, dexamethasone (DECADRON) 10 mg in sodium chloride 0.9% 50 mL IVPB   Intravenous 1 time in Clinic/HOD Melvin Chisholm MD           Review of patient's allergies indicates:   Allergen Reactions    Ciprofloxacin Other (See Comments)     Made pt lose eye sight for seven days    Flagyl [metronidazole] Swelling     THROAT         Review of Systems   Constitutional: Positive for activity  change and fatigue. Negative for appetite change and fever.   HENT: Negative for sore throat.    Eyes: Negative for visual disturbance.   Respiratory: Negative for cough and shortness of breath.    Cardiovascular: Negative for chest pain.   Gastrointestinal: Negative for nausea and vomiting.   Endocrine: Negative for cold intolerance and heat intolerance.   Genitourinary: Negative for difficulty urinating.   Musculoskeletal: Positive for back pain and gait problem. Negative for arthralgias and neck pain.   Skin: Negative for rash.   Allergic/Immunologic: Negative for food allergies.   Neurological: Positive for tremors, seizures and headaches. Negative for dizziness, speech difficulty, weakness and numbness.   Hematological: Does not bruise/bleed easily.   Psychiatric/Behavioral: Negative for agitation, decreased concentration and sleep disturbance. The patient is nervous/anxious.        Objective:      Neurologic Exam     Mental Status   Oriented to person, place, and time.     Cranial Nerves     CN III, IV, VI   Pupils are equal, round, and reactive to light.    Gait, Coordination, and Reflexes     Reflexes   Right brachioradialis: 2+  Left brachioradialis: 2+  Right biceps: 2+  Left biceps: 2+  Right triceps: 2+  Left triceps: 2+  Right patellar: 2+  Left patellar: 2+  Right achilles: 2+  Left achilles: 2+    Physical Exam  Constitutional:       General: She is not in acute distress.     Appearance: She is obese. She is not diaphoretic.   HENT:      Head: Normocephalic and atraumatic.   Eyes:      General:         Right eye: No discharge.         Left eye: No discharge.      Extraocular Movements: Extraocular movements intact.      Conjunctiva/sclera: Conjunctivae normal.      Pupils: Pupils are equal, round, and reactive to light.   Neck:      Musculoskeletal: Normal range of motion and neck supple.      Thyroid: No thyromegaly.   Cardiovascular:      Rate and Rhythm: Normal rate and regular rhythm.   Pulmonary:       Effort: Pulmonary effort is normal.      Breath sounds: Normal breath sounds.   Abdominal:      General: Bowel sounds are normal.      Palpations: Abdomen is soft.   Musculoskeletal:         General: No tenderness.   Skin:     General: Skin is warm and dry.   Neurological:      Mental Status: She is alert and oriented to person, place, and time.      Cranial Nerves: Cranial nerves are intact. No cranial nerve deficit, dysarthria or facial asymmetry.      Sensory: Sensation is intact.      Motor: Motor function is intact. No tremor (none at present) or abnormal muscle tone.      Coordination: Coordination normal.      Gait: Gait abnormal (slow, antalgic gait due to back pain).      Deep Tendon Reflexes: Reflexes are normal and symmetric.      Reflex Scores:       Tricep reflexes are 2+ on the right side and 2+ on the left side.       Bicep reflexes are 2+ on the right side and 2+ on the left side.       Brachioradialis reflexes are 2+ on the right side and 2+ on the left side.       Patellar reflexes are 2+ on the right side and 2+ on the left side.       Achilles reflexes are 2+ on the right side and 2+ on the left side.  Psychiatric:         Mood and Affect: Mood normal.         Behavior: Behavior normal.         Assessment:       1. Seizure-like activity    2. Anxiety        Plan:   Seizure-like activity  -     EEG; Future  No AED at this time. Suspect non-epileptic events, more likely panic attacks  If abnormality, will have her return for folow up, to start AED.    Anxiety  Continue Lexapro and diazepam.   May take 1/4-1/2 diazepam tablet for anxiety and less sedation. Take as needed.  Maintain lifestyle with less stress whenever possible.      We discussed occupational risks and hazards, driving restrictions and limitations, and general safety in patients with epilepsy and patients with episodes of loss of consciousness from any etiology. I specifically pointed out how  patients can put themselves and  others at serious risks (leading to death and/or injury) if they have a seizure (or episode of loss of consciousness)  while driving. Patient verbalized understanding.    Mikayla Caraballo, PA

## 2020-12-19 PROBLEM — R11.0 NAUSEA: Status: ACTIVE | Noted: 2020-01-01

## 2020-12-19 PROBLEM — M54.9 BACK PAIN: Status: ACTIVE | Noted: 2020-01-01

## 2020-12-19 PROBLEM — N39.0 UTI (URINARY TRACT INFECTION): Status: ACTIVE | Noted: 2020-01-01

## 2020-12-19 PROBLEM — R11.0 NAUSEA: Status: RESOLVED | Noted: 2020-01-01 | Resolved: 2020-01-01

## 2020-12-19 PROBLEM — C79.9 METASTATIC CANCER: Status: ACTIVE | Noted: 2020-01-01

## 2020-12-19 PROBLEM — R11.2 INTRACTABLE NAUSEA AND VOMITING: Status: ACTIVE | Noted: 2020-01-01

## 2021-01-01 ENCOUNTER — HOSPITAL ENCOUNTER (INPATIENT)
Facility: HOSPITAL | Age: 46
LOS: 2 days | Discharge: HOME | DRG: 809 | End: 2021-01-05
Attending: INTERNAL MEDICINE | Admitting: INTERNAL MEDICINE
Payer: MEDICAID

## 2021-01-01 VITALS
OXYGEN SATURATION: 95 % | BODY MASS INDEX: 39.49 KG/M2 | HEART RATE: 112 BPM | SYSTOLIC BLOOD PRESSURE: 137 MMHG | DIASTOLIC BLOOD PRESSURE: 83 MMHG | TEMPERATURE: 98 F | WEIGHT: 222.88 LBS | RESPIRATION RATE: 20 BRPM | HEIGHT: 63 IN

## 2021-01-01 DIAGNOSIS — K92.0 GASTROINTESTINAL HEMORRHAGE WITH HEMATEMESIS: ICD-10-CM

## 2021-01-01 DIAGNOSIS — I10 ESSENTIAL HYPERTENSION: ICD-10-CM

## 2021-01-01 DIAGNOSIS — K92.2 GASTROINTESTINAL HEMORRHAGE, UNSPECIFIED GASTROINTESTINAL HEMORRHAGE TYPE: ICD-10-CM

## 2021-01-01 DIAGNOSIS — Z91.89 AT RISK FOR PROLONGED QT INTERVAL SYNDROME: ICD-10-CM

## 2021-01-01 DIAGNOSIS — Z79.4 TYPE 2 DIABETES MELLITUS WITH HYPERGLYCEMIA, WITH LONG-TERM CURRENT USE OF INSULIN: ICD-10-CM

## 2021-01-01 DIAGNOSIS — G62.9 NEUROPATHY: ICD-10-CM

## 2021-01-01 DIAGNOSIS — D61.810 PANCYTOPENIA DUE TO CHEMOTHERAPY: ICD-10-CM

## 2021-01-01 DIAGNOSIS — E11.65 TYPE 2 DIABETES MELLITUS WITH HYPERGLYCEMIA, WITH LONG-TERM CURRENT USE OF INSULIN: ICD-10-CM

## 2021-01-01 DIAGNOSIS — K62.5 BRIGHT RED BLOOD PER RECTUM: Primary | ICD-10-CM

## 2021-01-01 DIAGNOSIS — F32.A DEPRESSION, UNSPECIFIED DEPRESSION TYPE: ICD-10-CM

## 2021-01-01 DIAGNOSIS — C50.912 MALIGNANT NEOPLASM OF LEFT BREAST IN FEMALE, ESTROGEN RECEPTOR NEGATIVE, UNSPECIFIED SITE OF BREAST: ICD-10-CM

## 2021-01-01 DIAGNOSIS — D61.818 PANCYTOPENIA: ICD-10-CM

## 2021-01-01 DIAGNOSIS — K92.0 HEMATEMESIS, PRESENCE OF NAUSEA NOT SPECIFIED: ICD-10-CM

## 2021-01-01 DIAGNOSIS — R79.89 ELEVATED LFTS: ICD-10-CM

## 2021-01-01 DIAGNOSIS — D70.8 OTHER NEUTROPENIA: ICD-10-CM

## 2021-01-01 DIAGNOSIS — Z17.1 MALIGNANT NEOPLASM OF AREOLA OF LEFT BREAST IN FEMALE, ESTROGEN RECEPTOR NEGATIVE: ICD-10-CM

## 2021-01-01 DIAGNOSIS — C50.012 MALIGNANT NEOPLASM OF AREOLA OF LEFT BREAST IN FEMALE, ESTROGEN RECEPTOR NEGATIVE: ICD-10-CM

## 2021-01-01 DIAGNOSIS — Z17.1 MALIGNANT NEOPLASM OF LEFT BREAST IN FEMALE, ESTROGEN RECEPTOR NEGATIVE, UNSPECIFIED SITE OF BREAST: ICD-10-CM

## 2021-01-01 DIAGNOSIS — R04.2 HEMOPTYSIS: ICD-10-CM

## 2021-01-01 DIAGNOSIS — D62 ACUTE BLOOD LOSS ANEMIA: ICD-10-CM

## 2021-01-01 DIAGNOSIS — D69.6 THROMBOCYTOPENIA: ICD-10-CM

## 2021-01-01 LAB
ABO + RH BLD: NORMAL
ALBUMIN SERPL BCP-MCNC: 2.4 G/DL (ref 3.5–5.2)
ALBUMIN SERPL BCP-MCNC: 2.5 G/DL (ref 3.5–5.2)
ALBUMIN SERPL BCP-MCNC: 2.5 G/DL (ref 3.5–5.2)
ALP SERPL-CCNC: 435 U/L (ref 55–135)
ALP SERPL-CCNC: 498 U/L (ref 55–135)
ALP SERPL-CCNC: 580 U/L (ref 55–135)
ALT SERPL W/O P-5'-P-CCNC: 118 U/L (ref 10–44)
ALT SERPL W/O P-5'-P-CCNC: 141 U/L (ref 10–44)
ALT SERPL W/O P-5'-P-CCNC: 94 U/L (ref 10–44)
ANION GAP SERPL CALC-SCNC: 11 MMOL/L (ref 8–16)
ANION GAP SERPL CALC-SCNC: 12 MMOL/L (ref 8–16)
ANION GAP SERPL CALC-SCNC: 15 MMOL/L (ref 8–16)
ANISOCYTOSIS BLD QL SMEAR: ABNORMAL
ANISOCYTOSIS BLD QL SMEAR: SLIGHT
APTT BLDCRRT: 23.9 SEC (ref 21–32)
AST SERPL-CCNC: 101 U/L (ref 10–40)
AST SERPL-CCNC: 136 U/L (ref 10–40)
AST SERPL-CCNC: 157 U/L (ref 10–40)
BASOPHILS # BLD AUTO: 0 K/UL (ref 0–0.2)
BASOPHILS # BLD AUTO: 0 K/UL (ref 0–0.2)
BASOPHILS # BLD AUTO: 0.01 K/UL (ref 0–0.2)
BASOPHILS NFR BLD: 0 % (ref 0–1.9)
BASOPHILS NFR BLD: 0.6 % (ref 0–1.9)
BASOPHILS NFR BLD: 0.7 % (ref 0–1.9)
BASOPHILS NFR BLD: 0.8 % (ref 0–1.9)
BASOPHILS NFR BLD: 0.8 % (ref 0–1.9)
BASOPHILS NFR BLD: 1 % (ref 0–1.9)
BASOPHILS NFR BLD: 1.1 % (ref 0–1.9)
BASOPHILS NFR BLD: 1.2 % (ref 0–1.9)
BILIRUB SERPL-MCNC: 0.5 MG/DL (ref 0.1–1)
BILIRUB SERPL-MCNC: 0.9 MG/DL (ref 0.1–1)
BILIRUB SERPL-MCNC: 1.1 MG/DL (ref 0.1–1)
BLD GP AB SCN CELLS X3 SERPL QL: NORMAL
BLD PROD TYP BPU: NORMAL
BLOOD UNIT EXPIRATION DATE: NORMAL
BLOOD UNIT TYPE CODE: 2800
BLOOD UNIT TYPE CODE: 6200
BLOOD UNIT TYPE: NORMAL
BUN SERPL-MCNC: 7 MG/DL (ref 6–20)
BUN SERPL-MCNC: 8 MG/DL (ref 6–20)
BUN SERPL-MCNC: 8 MG/DL (ref 6–20)
BURR CELLS BLD QL SMEAR: ABNORMAL
CALCIUM SERPL-MCNC: 8.1 MG/DL (ref 8.7–10.5)
CALCIUM SERPL-MCNC: 8.4 MG/DL (ref 8.7–10.5)
CALCIUM SERPL-MCNC: 8.5 MG/DL (ref 8.7–10.5)
CHLORIDE SERPL-SCNC: 101 MMOL/L (ref 95–110)
CHLORIDE SERPL-SCNC: 102 MMOL/L (ref 95–110)
CHLORIDE SERPL-SCNC: 103 MMOL/L (ref 95–110)
CO2 SERPL-SCNC: 21 MMOL/L (ref 23–29)
CO2 SERPL-SCNC: 23 MMOL/L (ref 23–29)
CO2 SERPL-SCNC: 24 MMOL/L (ref 23–29)
CODING SYSTEM: NORMAL
CREAT SERPL-MCNC: 0.5 MG/DL (ref 0.5–1.4)
CREAT SERPL-MCNC: 0.5 MG/DL (ref 0.5–1.4)
CREAT SERPL-MCNC: 0.6 MG/DL (ref 0.5–1.4)
DACRYOCYTES BLD QL SMEAR: ABNORMAL
DIFFERENTIAL METHOD: ABNORMAL
DISPENSE STATUS: NORMAL
EOSINOPHIL # BLD AUTO: 0 K/UL (ref 0–0.5)
EOSINOPHIL # BLD AUTO: 0.1 K/UL (ref 0–0.5)
EOSINOPHIL # BLD AUTO: 0.1 K/UL (ref 0–0.5)
EOSINOPHIL NFR BLD: 1.9 % (ref 0–8)
EOSINOPHIL NFR BLD: 2.4 % (ref 0–8)
EOSINOPHIL NFR BLD: 2.4 % (ref 0–8)
EOSINOPHIL NFR BLD: 2.8 % (ref 0–8)
EOSINOPHIL NFR BLD: 3 % (ref 0–8)
EOSINOPHIL NFR BLD: 3.2 % (ref 0–8)
EOSINOPHIL NFR BLD: 3.3 % (ref 0–8)
EOSINOPHIL NFR BLD: 3.5 % (ref 0–8)
EOSINOPHIL NFR BLD: 3.8 % (ref 0–8)
EOSINOPHIL NFR BLD: 4.2 % (ref 0–8)
ERYTHROCYTE [DISTWIDTH] IN BLOOD BY AUTOMATED COUNT: 19.7 % (ref 11.5–14.5)
ERYTHROCYTE [DISTWIDTH] IN BLOOD BY AUTOMATED COUNT: 20.1 % (ref 11.5–14.5)
ERYTHROCYTE [DISTWIDTH] IN BLOOD BY AUTOMATED COUNT: 20.2 % (ref 11.5–14.5)
ERYTHROCYTE [DISTWIDTH] IN BLOOD BY AUTOMATED COUNT: 20.4 % (ref 11.5–14.5)
ERYTHROCYTE [DISTWIDTH] IN BLOOD BY AUTOMATED COUNT: 20.4 % (ref 11.5–14.5)
ERYTHROCYTE [DISTWIDTH] IN BLOOD BY AUTOMATED COUNT: 20.7 % (ref 11.5–14.5)
EST. GFR  (AFRICAN AMERICAN): >60 ML/MIN/1.73 M^2
EST. GFR  (NON AFRICAN AMERICAN): >60 ML/MIN/1.73 M^2
GIANT PLATELETS BLD QL SMEAR: PRESENT
GLUCOSE SERPL-MCNC: 101 MG/DL (ref 70–110)
GLUCOSE SERPL-MCNC: 106 MG/DL (ref 70–110)
GLUCOSE SERPL-MCNC: 128 MG/DL (ref 70–110)
HCT VFR BLD AUTO: 27.6 % (ref 37–48.5)
HCT VFR BLD AUTO: 27.8 % (ref 37–48.5)
HCT VFR BLD AUTO: 29.5 % (ref 37–48.5)
HCT VFR BLD AUTO: 29.6 % (ref 37–48.5)
HCT VFR BLD AUTO: 30.3 % (ref 37–48.5)
HCT VFR BLD AUTO: 30.7 % (ref 37–48.5)
HCT VFR BLD AUTO: 31.6 % (ref 37–48.5)
HCT VFR BLD AUTO: 33.1 % (ref 37–48.5)
HCT VFR BLD AUTO: 33.5 % (ref 37–48.5)
HCT VFR BLD AUTO: 33.8 % (ref 37–48.5)
HGB BLD-MCNC: 10.2 G/DL (ref 12–16)
HGB BLD-MCNC: 10.4 G/DL (ref 12–16)
HGB BLD-MCNC: 10.4 G/DL (ref 12–16)
HGB BLD-MCNC: 11.1 G/DL (ref 12–16)
HGB BLD-MCNC: 8.7 G/DL (ref 12–16)
HGB BLD-MCNC: 9.2 G/DL (ref 12–16)
HGB BLD-MCNC: 9.4 G/DL (ref 12–16)
HGB BLD-MCNC: 9.6 G/DL (ref 12–16)
HGB BLD-MCNC: 9.6 G/DL (ref 12–16)
HGB BLD-MCNC: 9.7 G/DL (ref 12–16)
HYPOCHROMIA BLD QL SMEAR: ABNORMAL
IMM GRANULOCYTES # BLD AUTO: 0.02 K/UL (ref 0–0.04)
IMM GRANULOCYTES # BLD AUTO: 0.02 K/UL (ref 0–0.04)
IMM GRANULOCYTES # BLD AUTO: 0.03 K/UL (ref 0–0.04)
IMM GRANULOCYTES # BLD AUTO: 0.04 K/UL (ref 0–0.04)
IMM GRANULOCYTES # BLD AUTO: 0.05 K/UL (ref 0–0.04)
IMM GRANULOCYTES # BLD AUTO: 0.07 K/UL (ref 0–0.04)
IMM GRANULOCYTES # BLD AUTO: ABNORMAL K/UL (ref 0–0.04)
IMM GRANULOCYTES NFR BLD AUTO: 1.6 % (ref 0–0.5)
IMM GRANULOCYTES NFR BLD AUTO: 1.6 % (ref 0–0.5)
IMM GRANULOCYTES NFR BLD AUTO: 2.8 % (ref 0–0.5)
IMM GRANULOCYTES NFR BLD AUTO: 3.2 % (ref 0–0.5)
IMM GRANULOCYTES NFR BLD AUTO: 3.5 % (ref 0–0.5)
IMM GRANULOCYTES NFR BLD AUTO: 3.8 % (ref 0–0.5)
IMM GRANULOCYTES NFR BLD AUTO: 4.4 % (ref 0–0.5)
IMM GRANULOCYTES NFR BLD AUTO: ABNORMAL % (ref 0–0.5)
INR PPP: 1 (ref 0.8–1.2)
LACTATE SERPL-SCNC: 2.2 MMOL/L (ref 0.5–2.2)
LYMPHOCYTES # BLD AUTO: 0.2 K/UL (ref 1–4.8)
LYMPHOCYTES # BLD AUTO: 0.3 K/UL (ref 1–4.8)
LYMPHOCYTES # BLD AUTO: 0.4 K/UL (ref 1–4.8)
LYMPHOCYTES NFR BLD: 16.2 % (ref 18–48)
LYMPHOCYTES NFR BLD: 17 % (ref 18–48)
LYMPHOCYTES NFR BLD: 19.5 % (ref 18–48)
LYMPHOCYTES NFR BLD: 21.3 % (ref 18–48)
LYMPHOCYTES NFR BLD: 22.9 % (ref 18–48)
LYMPHOCYTES NFR BLD: 23.8 % (ref 18–48)
LYMPHOCYTES NFR BLD: 24.7 % (ref 18–48)
LYMPHOCYTES NFR BLD: 25 % (ref 18–48)
LYMPHOCYTES NFR BLD: 30.9 % (ref 18–48)
LYMPHOCYTES NFR BLD: 34.1 % (ref 18–48)
MAGNESIUM SERPL-MCNC: 1.7 MG/DL (ref 1.6–2.6)
MAGNESIUM SERPL-MCNC: 1.8 MG/DL (ref 1.6–2.6)
MAGNESIUM SERPL-MCNC: 1.9 MG/DL (ref 1.6–2.6)
MCH RBC QN AUTO: 22.7 PG (ref 27–31)
MCH RBC QN AUTO: 22.9 PG (ref 27–31)
MCH RBC QN AUTO: 23 PG (ref 27–31)
MCH RBC QN AUTO: 23.2 PG (ref 27–31)
MCH RBC QN AUTO: 23.2 PG (ref 27–31)
MCH RBC QN AUTO: 23.3 PG (ref 27–31)
MCH RBC QN AUTO: 23.4 PG (ref 27–31)
MCH RBC QN AUTO: 23.4 PG (ref 27–31)
MCH RBC QN AUTO: 23.6 PG (ref 27–31)
MCH RBC QN AUTO: 23.6 PG (ref 27–31)
MCHC RBC AUTO-ENTMCNC: 31 G/DL (ref 32–36)
MCHC RBC AUTO-ENTMCNC: 31.3 G/DL (ref 32–36)
MCHC RBC AUTO-ENTMCNC: 31.4 G/DL (ref 32–36)
MCHC RBC AUTO-ENTMCNC: 31.6 G/DL (ref 32–36)
MCHC RBC AUTO-ENTMCNC: 31.7 G/DL (ref 32–36)
MCHC RBC AUTO-ENTMCNC: 31.8 G/DL (ref 32–36)
MCHC RBC AUTO-ENTMCNC: 32.3 G/DL (ref 32–36)
MCHC RBC AUTO-ENTMCNC: 32.5 G/DL (ref 32–36)
MCHC RBC AUTO-ENTMCNC: 32.8 G/DL (ref 32–36)
MCHC RBC AUTO-ENTMCNC: 33.3 G/DL (ref 32–36)
MCV RBC AUTO: 71 FL (ref 82–98)
MCV RBC AUTO: 72 FL (ref 82–98)
MCV RBC AUTO: 73 FL (ref 82–98)
MCV RBC AUTO: 73 FL (ref 82–98)
MCV RBC AUTO: 74 FL (ref 82–98)
MCV RBC AUTO: 76 FL (ref 82–98)
MONOCYTES # BLD AUTO: 0.1 K/UL (ref 0.3–1)
MONOCYTES NFR BLD: 12.9 % (ref 4–15)
MONOCYTES NFR BLD: 14.9 % (ref 4–15)
MONOCYTES NFR BLD: 3 % (ref 4–15)
MONOCYTES NFR BLD: 4.2 % (ref 4–15)
MONOCYTES NFR BLD: 5.7 % (ref 4–15)
MONOCYTES NFR BLD: 6.3 % (ref 4–15)
MONOCYTES NFR BLD: 7.3 % (ref 4–15)
MONOCYTES NFR BLD: 7.4 % (ref 4–15)
MONOCYTES NFR BLD: 7.6 % (ref 4–15)
MONOCYTES NFR BLD: 8.2 % (ref 4–15)
MYELOCYTES NFR BLD MANUAL: 1 %
NEUTROPHILS # BLD AUTO: 0.4 K/UL (ref 1.8–7.7)
NEUTROPHILS # BLD AUTO: 0.4 K/UL (ref 1.8–7.7)
NEUTROPHILS # BLD AUTO: 0.5 K/UL (ref 1.8–7.7)
NEUTROPHILS # BLD AUTO: 0.7 K/UL (ref 1.8–7.7)
NEUTROPHILS # BLD AUTO: 0.7 K/UL (ref 1.8–7.7)
NEUTROPHILS # BLD AUTO: 0.8 K/UL (ref 1.8–7.7)
NEUTROPHILS # BLD AUTO: 0.9 K/UL (ref 1.8–7.7)
NEUTROPHILS # BLD AUTO: 1 K/UL (ref 1.8–7.7)
NEUTROPHILS # BLD AUTO: 1 K/UL (ref 1.8–7.7)
NEUTROPHILS NFR BLD: 44.8 % (ref 38–73)
NEUTROPHILS NFR BLD: 46.7 % (ref 38–73)
NEUTROPHILS NFR BLD: 59.9 % (ref 38–73)
NEUTROPHILS NFR BLD: 61.2 % (ref 38–73)
NEUTROPHILS NFR BLD: 61.9 % (ref 38–73)
NEUTROPHILS NFR BLD: 64.2 % (ref 38–73)
NEUTROPHILS NFR BLD: 65.6 % (ref 38–73)
NEUTROPHILS NFR BLD: 70 % (ref 38–73)
NEUTROPHILS NFR BLD: 71.2 % (ref 38–73)
NEUTROPHILS NFR BLD: 74 % (ref 38–73)
NEUTS BAND NFR BLD MANUAL: 2 %
NRBC BLD-RTO: 0 /100 WBC
NRBC BLD-RTO: 2 /100 WBC
NUM UNITS TRANS WBC-POOR PLATPHERESIS: NORMAL
OVALOCYTES BLD QL SMEAR: ABNORMAL
PHOSPHATE SERPL-MCNC: 3.8 MG/DL (ref 2.7–4.5)
PHOSPHATE SERPL-MCNC: 4.1 MG/DL (ref 2.7–4.5)
PHOSPHATE SERPL-MCNC: 4.6 MG/DL (ref 2.7–4.5)
PLATELET # BLD AUTO: 12 K/UL (ref 150–350)
PLATELET # BLD AUTO: 12 K/UL (ref 150–350)
PLATELET # BLD AUTO: 13 K/UL (ref 150–350)
PLATELET # BLD AUTO: 13 K/UL (ref 150–350)
PLATELET # BLD AUTO: 16 K/UL (ref 150–350)
PLATELET # BLD AUTO: 19 K/UL (ref 150–350)
PLATELET # BLD AUTO: 40 K/UL (ref 150–350)
PLATELET # BLD AUTO: 43 K/UL (ref 150–350)
PLATELET # BLD AUTO: 45 K/UL (ref 150–350)
PLATELET # BLD AUTO: 48 K/UL (ref 150–350)
PLATELET BLD QL SMEAR: ABNORMAL
PMV BLD AUTO: ABNORMAL FL (ref 9.2–12.9)
POCT GLUCOSE: 101 MG/DL (ref 70–110)
POCT GLUCOSE: 111 MG/DL (ref 70–110)
POCT GLUCOSE: 117 MG/DL (ref 70–110)
POCT GLUCOSE: 127 MG/DL (ref 70–110)
POCT GLUCOSE: 133 MG/DL (ref 70–110)
POCT GLUCOSE: 139 MG/DL (ref 70–110)
POCT GLUCOSE: 145 MG/DL (ref 70–110)
POCT GLUCOSE: 152 MG/DL (ref 70–110)
POCT GLUCOSE: 162 MG/DL (ref 70–110)
POIKILOCYTOSIS BLD QL SMEAR: ABNORMAL
POIKILOCYTOSIS BLD QL SMEAR: SLIGHT
POLYCHROMASIA BLD QL SMEAR: ABNORMAL
POTASSIUM SERPL-SCNC: 4 MMOL/L (ref 3.5–5.1)
POTASSIUM SERPL-SCNC: 4.1 MMOL/L (ref 3.5–5.1)
POTASSIUM SERPL-SCNC: 4.3 MMOL/L (ref 3.5–5.1)
PROT SERPL-MCNC: 5.9 G/DL (ref 6–8.4)
PROT SERPL-MCNC: 6 G/DL (ref 6–8.4)
PROT SERPL-MCNC: 6.2 G/DL (ref 6–8.4)
PROTHROMBIN TIME: 10.9 SEC (ref 9–12.5)
RBC # BLD AUTO: 3.74 M/UL (ref 4–5.4)
RBC # BLD AUTO: 3.9 M/UL (ref 4–5.4)
RBC # BLD AUTO: 4.02 M/UL (ref 4–5.4)
RBC # BLD AUTO: 4.07 M/UL (ref 4–5.4)
RBC # BLD AUTO: 4.18 M/UL (ref 4–5.4)
RBC # BLD AUTO: 4.27 M/UL (ref 4–5.4)
RBC # BLD AUTO: 4.44 M/UL (ref 4–5.4)
RBC # BLD AUTO: 4.46 M/UL (ref 4–5.4)
RBC # BLD AUTO: 4.49 M/UL (ref 4–5.4)
RBC # BLD AUTO: 4.78 M/UL (ref 4–5.4)
SCHISTOCYTES BLD QL SMEAR: ABNORMAL
SCHISTOCYTES BLD QL SMEAR: PRESENT
SCHISTOCYTES BLD QL SMEAR: PRESENT
SODIUM SERPL-SCNC: 137 MMOL/L (ref 136–145)
SODIUM SERPL-SCNC: 137 MMOL/L (ref 136–145)
SODIUM SERPL-SCNC: 138 MMOL/L (ref 136–145)
STOMATOCYTES BLD QL SMEAR: PRESENT
WBC # BLD AUTO: 0.85 K/UL (ref 3.9–12.7)
WBC # BLD AUTO: 0.85 K/UL (ref 3.9–12.7)
WBC # BLD AUTO: 0.94 K/UL (ref 3.9–12.7)
WBC # BLD AUTO: 1.05 K/UL (ref 3.9–12.7)
WBC # BLD AUTO: 1.09 K/UL (ref 3.9–12.7)
WBC # BLD AUTO: 1.2 K/UL (ref 3.9–12.7)
WBC # BLD AUTO: 1.22 K/UL (ref 3.9–12.7)
WBC # BLD AUTO: 1.23 K/UL (ref 3.9–12.7)
WBC # BLD AUTO: 1.42 K/UL (ref 3.9–12.7)
WBC # BLD AUTO: 1.6 K/UL (ref 3.9–12.7)

## 2021-01-01 PROCEDURE — 86078 PHYS BLOOD BANK SERV REACTJ: CPT | Mod: ,,, | Performed by: PATHOLOGY

## 2021-01-01 PROCEDURE — 36415 COLL VENOUS BLD VENIPUNCTURE: CPT

## 2021-01-01 PROCEDURE — 25000003 PHARM REV CODE 250: Performed by: STUDENT IN AN ORGANIZED HEALTH CARE EDUCATION/TRAINING PROGRAM

## 2021-01-01 PROCEDURE — 83735 ASSAY OF MAGNESIUM: CPT

## 2021-01-01 PROCEDURE — 99223 1ST HOSP IP/OBS HIGH 75: CPT | Mod: ,,, | Performed by: INTERNAL MEDICINE

## 2021-01-01 PROCEDURE — C9113 INJ PANTOPRAZOLE SODIUM, VIA: HCPCS | Performed by: STUDENT IN AN ORGANIZED HEALTH CARE EDUCATION/TRAINING PROGRAM

## 2021-01-01 PROCEDURE — P9037 PLATE PHERES LEUKOREDU IRRAD: HCPCS

## 2021-01-01 PROCEDURE — 93010 EKG 12-LEAD: ICD-10-PCS | Mod: ,,, | Performed by: INTERNAL MEDICINE

## 2021-01-01 PROCEDURE — 99233 SBSQ HOSP IP/OBS HIGH 50: CPT | Mod: ,,, | Performed by: INTERNAL MEDICINE

## 2021-01-01 PROCEDURE — 84100 ASSAY OF PHOSPHORUS: CPT

## 2021-01-01 PROCEDURE — 20600001 HC STEP DOWN PRIVATE ROOM

## 2021-01-01 PROCEDURE — 83605 ASSAY OF LACTIC ACID: CPT

## 2021-01-01 PROCEDURE — 99231 PR SUBSEQUENT HOSPITAL CARE,LEVL I: ICD-10-PCS | Mod: ,,, | Performed by: FAMILY MEDICINE

## 2021-01-01 PROCEDURE — 80053 COMPREHEN METABOLIC PANEL: CPT

## 2021-01-01 PROCEDURE — 85007 BL SMEAR W/DIFF WBC COUNT: CPT

## 2021-01-01 PROCEDURE — 36430 TRANSFUSION BLD/BLD COMPNT: CPT

## 2021-01-01 PROCEDURE — 85610 PROTHROMBIN TIME: CPT

## 2021-01-01 PROCEDURE — 63600175 PHARM REV CODE 636 W HCPCS: Performed by: STUDENT IN AN ORGANIZED HEALTH CARE EDUCATION/TRAINING PROGRAM

## 2021-01-01 PROCEDURE — 93005 ELECTROCARDIOGRAM TRACING: CPT

## 2021-01-01 PROCEDURE — 86900 BLOOD TYPING SEROLOGIC ABO: CPT

## 2021-01-01 PROCEDURE — 85027 COMPLETE CBC AUTOMATED: CPT

## 2021-01-01 PROCEDURE — 85730 THROMBOPLASTIN TIME PARTIAL: CPT

## 2021-01-01 PROCEDURE — 99223 1ST HOSP IP/OBS HIGH 75: CPT | Mod: ,,, | Performed by: STUDENT IN AN ORGANIZED HEALTH CARE EDUCATION/TRAINING PROGRAM

## 2021-01-01 PROCEDURE — 93010 ELECTROCARDIOGRAM REPORT: CPT | Mod: ,,, | Performed by: INTERNAL MEDICINE

## 2021-01-01 PROCEDURE — 85025 COMPLETE CBC W/AUTO DIFF WBC: CPT

## 2021-01-01 PROCEDURE — 85025 COMPLETE CBC W/AUTO DIFF WBC: CPT | Mod: 91

## 2021-01-01 PROCEDURE — 99233 PR SUBSEQUENT HOSPITAL CARE,LEVL III: ICD-10-PCS | Mod: ,,, | Performed by: INTERNAL MEDICINE

## 2021-01-01 PROCEDURE — 86078 PATHOLOGIST INTERPRETATION TRANSF REACTION: ICD-10-PCS | Mod: ,,, | Performed by: PATHOLOGY

## 2021-01-01 PROCEDURE — 99231 SBSQ HOSP IP/OBS SF/LOW 25: CPT | Mod: ,,, | Performed by: FAMILY MEDICINE

## 2021-01-01 PROCEDURE — 99223 PR INITIAL HOSPITAL CARE,LEVL III: ICD-10-PCS | Mod: ,,, | Performed by: STUDENT IN AN ORGANIZED HEALTH CARE EDUCATION/TRAINING PROGRAM

## 2021-01-01 PROCEDURE — 99223 PR INITIAL HOSPITAL CARE,LEVL III: ICD-10-PCS | Mod: ,,, | Performed by: INTERNAL MEDICINE

## 2021-01-01 RX ORDER — OCTREOTIDE ACETATE 50 UG/ML
50 INJECTION, SOLUTION INTRAVENOUS; SUBCUTANEOUS ONCE
Status: COMPLETED | OUTPATIENT
Start: 2021-01-01 | End: 2021-01-01

## 2021-01-01 RX ORDER — TALC
6 POWDER (GRAM) TOPICAL NIGHTLY PRN
Status: DISCONTINUED | OUTPATIENT
Start: 2021-01-01 | End: 2021-01-01 | Stop reason: HOSPADM

## 2021-01-01 RX ORDER — MORPHINE SULFATE 15 MG/1
30 TABLET, FILM COATED, EXTENDED RELEASE ORAL 2 TIMES DAILY
Status: DISCONTINUED | OUTPATIENT
Start: 2021-01-01 | End: 2021-01-01 | Stop reason: HOSPADM

## 2021-01-01 RX ORDER — PANTOPRAZOLE SODIUM 40 MG/10ML
40 INJECTION, POWDER, LYOPHILIZED, FOR SOLUTION INTRAVENOUS 2 TIMES DAILY
Status: DISCONTINUED | OUTPATIENT
Start: 2021-01-01 | End: 2021-01-01 | Stop reason: HOSPADM

## 2021-01-01 RX ORDER — DIPHENHYDRAMINE HYDROCHLORIDE 50 MG/ML
25 INJECTION INTRAMUSCULAR; INTRAVENOUS ONCE
Status: COMPLETED | OUTPATIENT
Start: 2021-01-01 | End: 2021-01-01

## 2021-01-01 RX ORDER — GLUCAGON 1 MG
1 KIT INJECTION
Status: DISCONTINUED | OUTPATIENT
Start: 2021-01-01 | End: 2021-01-01 | Stop reason: HOSPADM

## 2021-01-01 RX ORDER — HYDROCODONE BITARTRATE AND ACETAMINOPHEN 500; 5 MG/1; MG/1
TABLET ORAL
Status: DISCONTINUED | OUTPATIENT
Start: 2021-01-01 | End: 2021-01-01 | Stop reason: HOSPADM

## 2021-01-01 RX ORDER — AMOXICILLIN 250 MG
1 CAPSULE ORAL 2 TIMES DAILY
Status: DISCONTINUED | OUTPATIENT
Start: 2021-01-01 | End: 2021-01-01 | Stop reason: HOSPADM

## 2021-01-01 RX ORDER — IBUPROFEN 200 MG
16 TABLET ORAL
Status: DISCONTINUED | OUTPATIENT
Start: 2021-01-01 | End: 2021-01-01 | Stop reason: HOSPADM

## 2021-01-01 RX ORDER — PROCHLORPERAZINE EDISYLATE 5 MG/ML
5 INJECTION INTRAMUSCULAR; INTRAVENOUS EVERY 6 HOURS PRN
Status: DISCONTINUED | OUTPATIENT
Start: 2021-01-01 | End: 2021-01-01 | Stop reason: HOSPADM

## 2021-01-01 RX ORDER — ATORVASTATIN CALCIUM 20 MG/1
20 TABLET, FILM COATED ORAL DAILY
Status: DISCONTINUED | OUTPATIENT
Start: 2021-01-01 | End: 2021-01-01 | Stop reason: HOSPADM

## 2021-01-01 RX ORDER — HYDROCODONE BITARTRATE AND ACETAMINOPHEN 500; 5 MG/1; MG/1
TABLET ORAL
Status: DISCONTINUED | OUTPATIENT
Start: 2021-01-01 | End: 2021-01-01 | Stop reason: SDUPTHER

## 2021-01-01 RX ORDER — IBUPROFEN 200 MG
24 TABLET ORAL
Status: DISCONTINUED | OUTPATIENT
Start: 2021-01-01 | End: 2021-01-01 | Stop reason: HOSPADM

## 2021-01-01 RX ORDER — NORTRIPTYLINE HYDROCHLORIDE 10 MG/1
10 CAPSULE ORAL NIGHTLY
Status: DISCONTINUED | OUTPATIENT
Start: 2021-01-01 | End: 2021-01-01 | Stop reason: HOSPADM

## 2021-01-01 RX ORDER — POLYETHYLENE GLYCOL 3350 17 G/17G
17 POWDER, FOR SOLUTION ORAL DAILY
Status: DISCONTINUED | OUTPATIENT
Start: 2021-01-01 | End: 2021-01-01 | Stop reason: HOSPADM

## 2021-01-01 RX ORDER — CITALOPRAM 20 MG/1
20 TABLET, FILM COATED ORAL DAILY
Status: DISCONTINUED | OUTPATIENT
Start: 2021-01-01 | End: 2021-01-01 | Stop reason: HOSPADM

## 2021-01-01 RX ORDER — ONDANSETRON 2 MG/ML
4 INJECTION INTRAMUSCULAR; INTRAVENOUS EVERY 8 HOURS PRN
Status: DISCONTINUED | OUTPATIENT
Start: 2021-01-01 | End: 2021-01-01 | Stop reason: HOSPADM

## 2021-01-01 RX ORDER — HYDROCODONE BITARTRATE AND ACETAMINOPHEN 500; 5 MG/1; MG/1
TABLET ORAL
Status: DISCONTINUED | OUTPATIENT
Start: 2021-01-01 | End: 2021-01-01

## 2021-01-01 RX ORDER — INSULIN ASPART 100 [IU]/ML
0-5 INJECTION, SOLUTION INTRAVENOUS; SUBCUTANEOUS
Status: DISCONTINUED | OUTPATIENT
Start: 2021-01-01 | End: 2021-01-01 | Stop reason: HOSPADM

## 2021-01-01 RX ORDER — PANTOPRAZOLE SODIUM 40 MG/1
40 TABLET, DELAYED RELEASE ORAL DAILY
Qty: 30 TABLET | Refills: 11 | Status: ON HOLD | OUTPATIENT
Start: 2021-01-01 | End: 2021-01-01 | Stop reason: HOSPADM

## 2021-01-01 RX ORDER — SODIUM CHLORIDE 0.9 % (FLUSH) 0.9 %
10 SYRINGE (ML) INJECTION
Status: DISCONTINUED | OUTPATIENT
Start: 2021-01-01 | End: 2021-01-01 | Stop reason: HOSPADM

## 2021-01-01 RX ORDER — DIAZEPAM 5 MG/1
5 TABLET ORAL EVERY 12 HOURS PRN
Status: DISCONTINUED | OUTPATIENT
Start: 2021-01-01 | End: 2021-01-01 | Stop reason: HOSPADM

## 2021-01-01 RX ORDER — GABAPENTIN 300 MG/1
900 CAPSULE ORAL 3 TIMES DAILY
Status: DISCONTINUED | OUTPATIENT
Start: 2021-01-01 | End: 2021-01-01 | Stop reason: HOSPADM

## 2021-01-01 RX ORDER — ACETAMINOPHEN 325 MG/1
650 TABLET ORAL ONCE
Status: COMPLETED | OUTPATIENT
Start: 2021-01-01 | End: 2021-01-01

## 2021-01-01 RX ADMIN — MORPHINE SULFATE 30 MG: 15 TABLET, FILM COATED, EXTENDED RELEASE ORAL at 08:01

## 2021-01-01 RX ADMIN — OCTREOTIDE ACETATE 50 MCG/HR: 500 INJECTION, SOLUTION INTRAVENOUS; SUBCUTANEOUS at 04:01

## 2021-01-01 RX ADMIN — GABAPENTIN 900 MG: 300 CAPSULE ORAL at 08:01

## 2021-01-01 RX ADMIN — OXYCODONE HYDROCHLORIDE 15 MG: 10 TABLET ORAL at 10:01

## 2021-01-01 RX ADMIN — DOCUSATE SODIUM 50MG AND SENNOSIDES 8.6MG 1 TABLET: 8.6; 5 TABLET, FILM COATED ORAL at 08:01

## 2021-01-01 RX ADMIN — NORTRIPTYLINE HYDROCHLORIDE 10 MG: 10 CAPSULE ORAL at 08:01

## 2021-01-01 RX ADMIN — ATORVASTATIN CALCIUM 20 MG: 20 TABLET, FILM COATED ORAL at 08:01

## 2021-01-01 RX ADMIN — PANTOPRAZOLE SODIUM 40 MG: 40 INJECTION, POWDER, FOR SOLUTION INTRAVENOUS at 08:01

## 2021-01-01 RX ADMIN — OXYCODONE HYDROCHLORIDE 15 MG: 10 TABLET ORAL at 04:01

## 2021-01-01 RX ADMIN — ONDANSETRON 4 MG: 2 INJECTION INTRAMUSCULAR; INTRAVENOUS at 04:01

## 2021-01-01 RX ADMIN — OXYCODONE HYDROCHLORIDE 15 MG: 10 TABLET ORAL at 03:01

## 2021-01-01 RX ADMIN — POLYETHYLENE GLYCOL 3350 17 G: 17 POWDER, FOR SOLUTION ORAL at 08:01

## 2021-01-01 RX ADMIN — OXYCODONE HYDROCHLORIDE 15 MG: 10 TABLET ORAL at 02:01

## 2021-01-01 RX ADMIN — PROCHLORPERAZINE EDISYLATE 5 MG: 5 INJECTION INTRAMUSCULAR; INTRAVENOUS at 04:01

## 2021-01-01 RX ADMIN — CITALOPRAM HYDROBROMIDE 20 MG: 20 TABLET ORAL at 08:01

## 2021-01-01 RX ADMIN — PROCHLORPERAZINE EDISYLATE 5 MG: 5 INJECTION INTRAMUSCULAR; INTRAVENOUS at 03:01

## 2021-01-01 RX ADMIN — DIPHENHYDRAMINE HYDROCHLORIDE 25 MG: 50 INJECTION, SOLUTION INTRAMUSCULAR; INTRAVENOUS at 12:01

## 2021-01-01 RX ADMIN — POLYETHYLENE GLYCOL 3350 17 G: 17 POWDER, FOR SOLUTION ORAL at 03:01

## 2021-01-01 RX ADMIN — ACETAMINOPHEN 650 MG: 325 TABLET ORAL at 12:01

## 2021-01-01 RX ADMIN — OCTREOTIDE ACETATE 50 MCG: 50 INJECTION, SOLUTION INTRAVENOUS; SUBCUTANEOUS at 04:01

## 2021-01-01 RX ADMIN — OXYCODONE HYDROCHLORIDE 15 MG: 10 TABLET ORAL at 11:01

## 2021-01-01 RX ADMIN — GABAPENTIN 900 MG: 300 CAPSULE ORAL at 03:01

## 2021-01-01 RX ADMIN — ONDANSETRON 4 MG: 2 INJECTION INTRAMUSCULAR; INTRAVENOUS at 08:01

## 2021-01-01 RX ADMIN — DIPHENHYDRAMINE HYDROCHLORIDE 25 MG: 50 INJECTION, SOLUTION INTRAMUSCULAR; INTRAVENOUS at 04:01

## 2021-01-01 RX ADMIN — PANTOPRAZOLE SODIUM 40 MG: 40 INJECTION, POWDER, FOR SOLUTION INTRAVENOUS at 02:01

## 2021-01-01 RX ADMIN — GABAPENTIN 900 MG: 300 CAPSULE ORAL at 04:01

## 2021-01-01 RX ADMIN — PROCHLORPERAZINE EDISYLATE 5 MG: 5 INJECTION INTRAMUSCULAR; INTRAVENOUS at 02:01

## 2021-01-01 RX ADMIN — PROCHLORPERAZINE EDISYLATE 5 MG: 5 INJECTION INTRAMUSCULAR; INTRAVENOUS at 10:01

## 2021-01-02 PROBLEM — K92.2 GI BLEED: Status: ACTIVE | Noted: 2021-01-01

## 2021-01-03 PROBLEM — D61.810 PANCYTOPENIA DUE TO CHEMOTHERAPY: Status: ACTIVE | Noted: 2021-01-01

## 2021-01-03 PROBLEM — D70.9 NEUTROPENIA: Status: ACTIVE | Noted: 2021-01-01

## 2021-01-03 PROBLEM — D62 ACUTE BLOOD LOSS ANEMIA: Status: ACTIVE | Noted: 2021-01-01

## 2021-01-03 PROBLEM — D69.6 THROMBOCYTOPENIA: Status: ACTIVE | Noted: 2021-01-01

## 2021-01-03 PROBLEM — D61.818 PANCYTOPENIA: Status: ACTIVE | Noted: 2021-01-01

## 2021-01-03 PROBLEM — K92.0 GASTROINTESTINAL HEMORRHAGE WITH HEMATEMESIS: Status: ACTIVE | Noted: 2021-01-01

## 2021-01-04 PROBLEM — K62.5 BRIGHT RED BLOOD PER RECTUM: Status: ACTIVE | Noted: 2021-01-01

## 2021-01-19 PROBLEM — R93.5 ABNORMAL CT OF THE ABDOMEN: Status: ACTIVE | Noted: 2017-05-24

## 2021-01-20 PROBLEM — R10.33 PERIUMBILICAL ABDOMINAL PAIN: Status: ACTIVE | Noted: 2021-01-01

## 2021-01-21 PROBLEM — Z71.89 GOALS OF CARE, COUNSELING/DISCUSSION: Status: ACTIVE | Noted: 2021-01-01

## 2021-04-23 LAB — PATHOLOGIST INTERPRETATION TRANSF REACTION: NORMAL

## 2021-07-19 LAB — SYMPTOMS P TRANSF RX PATIENT-IMP: NORMAL

## 2022-07-12 NOTE — PLAN OF CARE
----- Message from Martita Olivas sent at 7/12/2022  9:03 AM CDT -----  Regarding: same day appt  Contact: pt's mother  Type:  Same Day Appointment Request    Caller is requesting a same day appointment.  Caller declined first available appointment listed below.    Name of Caller: Pt's Mother  When is the first available appointment? 08/09/2022  Symptoms: abdominal pain, blood  Best Call Back Number: [phone]  Additional Information: n/a        SW following for DC needs. SW in communication with Catarina LU.    No SW needs identified at this time.      09/13/19 1200   Post-Acute Status   Post-Acute Authorization Other   Other Status No Post-Acute Service Needs     Ally Randle, JOSE  [facility]  [id number]

## (undated) DEVICE — TRAY MINOR GEN SURG

## (undated) DEVICE — APPLIER CLIP LIAGCLIP 9.375IN

## (undated) DEVICE — SYS LABEL CORRECT MED

## (undated) DEVICE — EVACUATOR WOUND BULB 100CC

## (undated) DEVICE — SUT VICRYL PLUS 4-0 P3 18IN

## (undated) DEVICE — APPLIER LIGACLIP MED 11IN

## (undated) DEVICE — SUT CTD VICRYL 3-0 CR/SH

## (undated) DEVICE — SUT SILK 3-0 SH 18IN BLACK

## (undated) DEVICE — PACK UNIVERSAL SPLIT II

## (undated) DEVICE — SPONGE COTTON TRAY 4X4IN

## (undated) DEVICE — BLADE 4IN EDGE INSULATED

## (undated) DEVICE — VAC WOUND DISPOSABLE PREVENA

## (undated) DEVICE — SEE MEDLINE ITEM 152572

## (undated) DEVICE — DRESSING GZ SURGICOUNT 4X8

## (undated) DEVICE — CLIP DOUBLE MICRO.

## (undated) DEVICE — CUP MEDICINE STERILE 2OZ

## (undated) DEVICE — DRAPE STERI INSTRUMENT 1018

## (undated) DEVICE — SOL NS 1000CC

## (undated) DEVICE — DRESSING XEROFORM FOIL PK 1X8

## (undated) DEVICE — HOOK STAY ELAS 5MM 8EA/PK

## (undated) DEVICE — GOWN SURGICAL X-LARGE

## (undated) DEVICE — SEE MEDLINE ITEM 152622

## (undated) DEVICE — STAPLER SKIN ROTATING HEAD

## (undated) DEVICE — SUT STRATAFIX PGAPCL 3 FS-1

## (undated) DEVICE — ADHESIVE DERMABOND ADVANCED

## (undated) DEVICE — GLOVE BIOGEL SKINSENSE PI 7.5

## (undated) DEVICE — SET FLUID TRANSFER ASEPTIC

## (undated) DEVICE — PROBE FLOW DOPPLER

## (undated) DEVICE — PAD ABD 8X10 STERILE

## (undated) DEVICE — GOWN AERO CHROME W/ TOWEL XL

## (undated) DEVICE — NDL HYPODERMIC 25GX2IN

## (undated) DEVICE — SYR DISP LL 5CC

## (undated) DEVICE — SUT ETHILON 2-0 PSLX 30IN

## (undated) DEVICE — NEOGUARD COVER 4X30CM STERILE

## (undated) DEVICE — SCRUB 10% POVIDONE IODINE 4OZ

## (undated) DEVICE — GEL AQUASONIC 100 STERILE20GM

## (undated) DEVICE — ELECTRODE BLADE INSULATED 1 IN

## (undated) DEVICE — WARMER DRAPE STERILE LF

## (undated) DEVICE — CLAMP SINGLE MICRO.

## (undated) DEVICE — SKIN MARKER DEVON 160

## (undated) DEVICE — DRAIN CHANNEL ROUND 15FR

## (undated) DEVICE — SUT MONOCRYL 3-0 PS-2 UND

## (undated) DEVICE — DRAPE THYROID WITH ARMBOARD

## (undated) DEVICE — SEE MEDLINE ITEM 157128

## (undated) DEVICE — BOVIE SUCTION

## (undated) DEVICE — NDL CONVENTIONAL HYPO 18GA 1IN

## (undated) DEVICE — SUT VICRYL PLUS 2-0 CT1 18

## (undated) DEVICE — MICRO CLIP

## (undated) DEVICE — SUT 9/0 5IN ETHILON BLK MON

## (undated) DEVICE — SEE MEDLINE ITEM 152186

## (undated) DEVICE — SUT 3-0 ETHILON 18 FS-1

## (undated) DEVICE — SYR ONLY LUER LOCK 20CC

## (undated) DEVICE — SEE MEDLINE ITEM 146417

## (undated) DEVICE — NDL 18GA X1 1/2 REG BEVEL

## (undated) DEVICE — BLANKET HYPER ADULT 24X60IN

## (undated) DEVICE — SYS PRINEO SKIN CLOSURE

## (undated) DEVICE — CLIPPER BLADE MOD 4406 (CAREF)

## (undated) DEVICE — GAUZE SPONGE 4X4 12PLY

## (undated) DEVICE — SEE MEDLINE ITEM 152742

## (undated) DEVICE — SUT 2-0 VICRYL / CT-1

## (undated) DEVICE — HOLDER DRAIN POUCH PINK

## (undated) DEVICE — GUIDE ENDOCAVITY NEEDLE 3.5X20

## (undated) DEVICE — ELECTRODE EXTENDED BLADE

## (undated) DEVICE — SKINMARKER & RULER REGULAR X-F

## (undated) DEVICE — SEE MEDLINE ITEM 152512

## (undated) DEVICE — BRA CLASSIC COMFORT 40 BLACK

## (undated) DEVICE — SUT VICRYL 3-0 27 SH

## (undated) DEVICE — SUT PDS II 2-0 CT1

## (undated) DEVICE — GAUZE FLUFF XXLG 36X36 2 PLY

## (undated) DEVICE — GLOVE BIOGEL SKINSENSE PI 7.0

## (undated) DEVICE — CORD BIPOLAR 12 FOOT

## (undated) DEVICE — SYR 50CC LL

## (undated) DEVICE — SUT CTD VICRYL 4-0 BR PS-2

## (undated) DEVICE — SPONGE LAP 18X18 PREWASHED

## (undated) DEVICE — ELECTRODE REM PLYHSV RETURN 9

## (undated) DEVICE — SUT SILK 2-0 BLK BR FSL 18

## (undated) DEVICE — POSITIONER HEAD DONUT 9IN FOAM

## (undated) DEVICE — SPONGE DERMACEA GAUZE 4X4

## (undated) DEVICE — COVERS PROBE NR-48 STERILE

## (undated) DEVICE — NDL HYPO REG 25G X 1 1/2

## (undated) DEVICE — SEE MEDLINE ITEM 157110

## (undated) DEVICE — DRAIN CHANNEL ROUND 19FR

## (undated) DEVICE — SUT PDS II 0 CT VIL MONO 36

## (undated) DEVICE — Device

## (undated) DEVICE — STOCKINET 4INX48

## (undated) DEVICE — NDL SAFETY 22G X 1.5 ECLIPSE

## (undated) DEVICE — SYR 10CC LUER LOCK

## (undated) DEVICE — SUT MCRYL PLUS 4-0 PS2 27IN